# Patient Record
Sex: FEMALE | Race: WHITE | Employment: OTHER | URBAN - METROPOLITAN AREA
[De-identification: names, ages, dates, MRNs, and addresses within clinical notes are randomized per-mention and may not be internally consistent; named-entity substitution may affect disease eponyms.]

---

## 2017-06-20 ENCOUNTER — ED HISTORICAL/CONVERTED ENCOUNTER (OUTPATIENT)
Dept: OTHER | Age: 82
End: 2017-06-20

## 2018-02-22 ENCOUNTER — IP HISTORICAL/CONVERTED ENCOUNTER (OUTPATIENT)
Dept: OTHER | Age: 83
End: 2018-02-22

## 2018-05-22 ENCOUNTER — ED HISTORICAL/CONVERTED ENCOUNTER (OUTPATIENT)
Dept: OTHER | Age: 83
End: 2018-05-22

## 2019-03-17 ENCOUNTER — APPOINTMENT (OUTPATIENT)
Dept: CT IMAGING | Age: 84
DRG: 061 | End: 2019-03-17
Attending: EMERGENCY MEDICINE
Payer: MEDICARE

## 2019-03-17 ENCOUNTER — HOSPITAL ENCOUNTER (INPATIENT)
Age: 84
LOS: 1 days | Discharge: ANOTHER HOSPITAL | DRG: 061 | End: 2019-03-18
Attending: EMERGENCY MEDICINE | Admitting: INTERNAL MEDICINE
Payer: MEDICARE

## 2019-03-17 DIAGNOSIS — R47.01 EXPRESSIVE APHASIA: ICD-10-CM

## 2019-03-17 DIAGNOSIS — R29.810 FACIAL DROOP: ICD-10-CM

## 2019-03-17 DIAGNOSIS — I63.9 CEREBROVASCULAR ACCIDENT (CVA), UNSPECIFIED MECHANISM (HCC): Primary | ICD-10-CM

## 2019-03-17 PROBLEM — E03.9 ACQUIRED HYPOTHYROIDISM: Status: ACTIVE | Noted: 2019-03-17

## 2019-03-17 LAB
ANION GAP SERPL CALC-SCNC: 7 MMOL/L (ref 5–15)
BASOPHILS # BLD: 0.1 K/UL (ref 0–0.1)
BASOPHILS NFR BLD: 1 % (ref 0–1)
BUN SERPL-MCNC: 12 MG/DL (ref 6–20)
BUN/CREAT SERPL: 15 (ref 12–20)
CALCIUM SERPL-MCNC: 8.7 MG/DL (ref 8.5–10.1)
CHLORIDE SERPL-SCNC: 99 MMOL/L (ref 97–108)
CO2 SERPL-SCNC: 27 MMOL/L (ref 21–32)
CREAT SERPL-MCNC: 0.81 MG/DL (ref 0.55–1.02)
DIFFERENTIAL METHOD BLD: NORMAL
EOSINOPHIL # BLD: 0.2 K/UL (ref 0–0.4)
EOSINOPHIL NFR BLD: 3 % (ref 0–7)
ERYTHROCYTE [DISTWIDTH] IN BLOOD BY AUTOMATED COUNT: 12 % (ref 11.5–14.5)
GLUCOSE BLD STRIP.AUTO-MCNC: 106 MG/DL (ref 65–100)
GLUCOSE SERPL-MCNC: 84 MG/DL (ref 65–100)
HCT VFR BLD AUTO: 41.6 % (ref 35–47)
HGB BLD-MCNC: 14.4 G/DL (ref 11.5–16)
IMM GRANULOCYTES # BLD AUTO: 0 K/UL (ref 0–0.04)
IMM GRANULOCYTES NFR BLD AUTO: 0 % (ref 0–0.5)
INR BLD: 1 (ref 0.9–1.2)
INR PPP: 1 (ref 0.9–1.1)
LYMPHOCYTES # BLD: 2.4 K/UL (ref 0.8–3.5)
LYMPHOCYTES NFR BLD: 41 % (ref 12–49)
MCH RBC QN AUTO: 32.9 PG (ref 26–34)
MCHC RBC AUTO-ENTMCNC: 34.6 G/DL (ref 30–36.5)
MCV RBC AUTO: 95 FL (ref 80–99)
MONOCYTES # BLD: 0.7 K/UL (ref 0–1)
MONOCYTES NFR BLD: 11 % (ref 5–13)
NEUTS SEG # BLD: 2.6 K/UL (ref 1.8–8)
NEUTS SEG NFR BLD: 44 % (ref 32–75)
NRBC # BLD: 0 K/UL (ref 0–0.01)
NRBC BLD-RTO: 0 PER 100 WBC
PLATELET # BLD AUTO: 223 K/UL (ref 150–400)
PMV BLD AUTO: 9.1 FL (ref 8.9–12.9)
POTASSIUM SERPL-SCNC: 3.6 MMOL/L (ref 3.5–5.1)
PROTHROMBIN TIME: 10 SEC (ref 9–11.1)
RBC # BLD AUTO: 4.38 M/UL (ref 3.8–5.2)
SERVICE CMNT-IMP: ABNORMAL
SODIUM SERPL-SCNC: 133 MMOL/L (ref 136–145)
WBC # BLD AUTO: 6 K/UL (ref 3.6–11)

## 2019-03-17 PROCEDURE — 70450 CT HEAD/BRAIN W/O DYE: CPT

## 2019-03-17 PROCEDURE — 85610 PROTHROMBIN TIME: CPT

## 2019-03-17 PROCEDURE — 99285 EMERGENCY DEPT VISIT HI MDM: CPT

## 2019-03-17 PROCEDURE — 36415 COLL VENOUS BLD VENIPUNCTURE: CPT

## 2019-03-17 PROCEDURE — 74011000258 HC RX REV CODE- 258: Performed by: EMERGENCY MEDICINE

## 2019-03-17 PROCEDURE — 80048 BASIC METABOLIC PNL TOTAL CA: CPT

## 2019-03-17 PROCEDURE — 80061 LIPID PANEL: CPT

## 2019-03-17 PROCEDURE — 96374 THER/PROPH/DIAG INJ IV PUSH: CPT

## 2019-03-17 PROCEDURE — 65270000029 HC RM PRIVATE

## 2019-03-17 PROCEDURE — 70496 CT ANGIOGRAPHY HEAD: CPT

## 2019-03-17 PROCEDURE — 83036 HEMOGLOBIN GLYCOSYLATED A1C: CPT

## 2019-03-17 PROCEDURE — 85025 COMPLETE CBC W/AUTO DIFF WBC: CPT

## 2019-03-17 PROCEDURE — 74011250636 HC RX REV CODE- 250/636: Performed by: EMERGENCY MEDICINE

## 2019-03-17 PROCEDURE — 3E03317 INTRODUCTION OF OTHER THROMBOLYTIC INTO PERIPHERAL VEIN, PERCUTANEOUS APPROACH: ICD-10-PCS | Performed by: EMERGENCY MEDICINE

## 2019-03-17 PROCEDURE — 74011636320 HC RX REV CODE- 636/320

## 2019-03-17 PROCEDURE — 82962 GLUCOSE BLOOD TEST: CPT

## 2019-03-17 PROCEDURE — 93005 ELECTROCARDIOGRAM TRACING: CPT

## 2019-03-17 RX ORDER — SODIUM CHLORIDE 9 MG/ML
50 INJECTION, SOLUTION INTRAVENOUS ONCE
Status: COMPLETED | OUTPATIENT
Start: 2019-03-17 | End: 2019-03-18

## 2019-03-17 RX ORDER — SODIUM CHLORIDE 0.9 % (FLUSH) 0.9 %
5-40 SYRINGE (ML) INJECTION EVERY 8 HOURS
Status: DISCONTINUED | OUTPATIENT
Start: 2019-03-17 | End: 2019-03-18 | Stop reason: HOSPADM

## 2019-03-17 RX ORDER — LABETALOL HCL 20 MG/4 ML
10 SYRINGE (ML) INTRAVENOUS
Status: DISCONTINUED | OUTPATIENT
Start: 2019-03-17 | End: 2019-03-18 | Stop reason: HOSPADM

## 2019-03-17 RX ORDER — SODIUM CHLORIDE 0.9 % (FLUSH) 0.9 %
5-40 SYRINGE (ML) INJECTION AS NEEDED
Status: DISCONTINUED | OUTPATIENT
Start: 2019-03-17 | End: 2019-03-18 | Stop reason: HOSPADM

## 2019-03-17 RX ORDER — ACETAMINOPHEN 650 MG/1
650 SUPPOSITORY RECTAL
Status: DISCONTINUED | OUTPATIENT
Start: 2019-03-17 | End: 2019-03-18 | Stop reason: HOSPADM

## 2019-03-17 RX ORDER — ACETAMINOPHEN 325 MG/1
650 TABLET ORAL
Status: DISCONTINUED | OUTPATIENT
Start: 2019-03-17 | End: 2019-03-18 | Stop reason: HOSPADM

## 2019-03-17 RX ADMIN — IOPAMIDOL 100 ML: 755 INJECTION, SOLUTION INTRAVENOUS at 22:12

## 2019-03-17 RX ADMIN — ALTEPLASE 38.96 MG: KIT at 22:42

## 2019-03-17 RX ADMIN — SODIUM CHLORIDE 50 ML: 900 INJECTION, SOLUTION INTRAVENOUS at 23:28

## 2019-03-18 ENCOUNTER — APPOINTMENT (OUTPATIENT)
Dept: NON INVASIVE DIAGNOSTICS | Age: 84
DRG: 065 | End: 2019-03-18
Attending: NURSE PRACTITIONER
Payer: MEDICARE

## 2019-03-18 ENCOUNTER — APPOINTMENT (OUTPATIENT)
Dept: GENERAL RADIOLOGY | Age: 84
DRG: 065 | End: 2019-03-18
Attending: FAMILY MEDICINE
Payer: MEDICARE

## 2019-03-18 ENCOUNTER — APPOINTMENT (OUTPATIENT)
Dept: MRI IMAGING | Age: 84
DRG: 065 | End: 2019-03-18
Attending: NURSE PRACTITIONER
Payer: MEDICARE

## 2019-03-18 ENCOUNTER — APPOINTMENT (OUTPATIENT)
Dept: CT IMAGING | Age: 84
DRG: 061 | End: 2019-03-18
Attending: INTERNAL MEDICINE
Payer: MEDICARE

## 2019-03-18 ENCOUNTER — APPOINTMENT (OUTPATIENT)
Dept: CT IMAGING | Age: 84
DRG: 065 | End: 2019-03-18
Attending: PSYCHIATRY & NEUROLOGY
Payer: MEDICARE

## 2019-03-18 ENCOUNTER — HOSPITAL ENCOUNTER (INPATIENT)
Age: 84
LOS: 3 days | Discharge: NURSING, REHAB OR LONG-TERM CARE | DRG: 065 | End: 2019-03-21
Attending: FAMILY MEDICINE | Admitting: FAMILY MEDICINE
Payer: MEDICARE

## 2019-03-18 VITALS
OXYGEN SATURATION: 98 % | HEART RATE: 64 BPM | BODY MASS INDEX: 16.64 KG/M2 | DIASTOLIC BLOOD PRESSURE: 56 MMHG | HEIGHT: 67 IN | WEIGHT: 106.04 LBS | RESPIRATION RATE: 14 BRPM | TEMPERATURE: 97.5 F | SYSTOLIC BLOOD PRESSURE: 125 MMHG

## 2019-03-18 DIAGNOSIS — I63.9 CEREBROVASCULAR ACCIDENT (CVA), UNSPECIFIED MECHANISM (HCC): ICD-10-CM

## 2019-03-18 DIAGNOSIS — I61.6 NONTRAUMATIC MULTIPLE LOCALIZED INTRACEREBRAL HEMORRHAGES, UNSPECIFIED LATERALITY (HCC): ICD-10-CM

## 2019-03-18 DIAGNOSIS — I68.0 CEREBRAL AMYLOID ANGIOPATHY (CODE): ICD-10-CM

## 2019-03-18 DIAGNOSIS — Z92.82 STATUS POST ADMINISTRATION OF TPA (RTPA) IN A DIFFERENT FACILITY WITHIN THE LAST 24 HOURS PRIOR TO ADMISSION TO CURRENT FACILITY: ICD-10-CM

## 2019-03-18 DIAGNOSIS — R47.01 APHASIA: ICD-10-CM

## 2019-03-18 PROBLEM — I60.9 SAH (SUBARACHNOID HEMORRHAGE) (HCC): Status: ACTIVE | Noted: 2019-03-18

## 2019-03-18 PROBLEM — I61.9 ICH (INTRACEREBRAL HEMORRHAGE) (HCC): Status: ACTIVE | Noted: 2019-03-18

## 2019-03-18 LAB
ALBUMIN SERPL-MCNC: 3.9 G/DL (ref 3.5–5)
ALBUMIN/GLOB SERPL: 1.1 {RATIO} (ref 1.1–2.2)
ALP SERPL-CCNC: 65 U/L (ref 45–117)
ALT SERPL-CCNC: 19 U/L (ref 12–78)
ANION GAP SERPL CALC-SCNC: 5 MMOL/L (ref 5–15)
AST SERPL-CCNC: 20 U/L (ref 15–37)
ATRIAL RATE: 72 BPM
BASOPHILS # BLD: 0 K/UL (ref 0–0.1)
BASOPHILS NFR BLD: 1 % (ref 0–1)
BILIRUB SERPL-MCNC: 0.6 MG/DL (ref 0.2–1)
BUN SERPL-MCNC: 9 MG/DL (ref 6–20)
BUN/CREAT SERPL: 12 (ref 12–20)
CALCIUM SERPL-MCNC: 9.1 MG/DL (ref 8.5–10.1)
CALCULATED P AXIS, ECG09: 69 DEGREES
CALCULATED R AXIS, ECG10: 23 DEGREES
CALCULATED T AXIS, ECG11: 55 DEGREES
CHLORIDE SERPL-SCNC: 103 MMOL/L (ref 97–108)
CHOLEST SERPL-MCNC: 189 MG/DL
CO2 SERPL-SCNC: 27 MMOL/L (ref 21–32)
CREAT SERPL-MCNC: 0.74 MG/DL (ref 0.55–1.02)
DIAGNOSIS, 93000: NORMAL
DIFFERENTIAL METHOD BLD: ABNORMAL
EOSINOPHIL # BLD: 0.1 K/UL (ref 0–0.4)
EOSINOPHIL NFR BLD: 1 % (ref 0–7)
ERYTHROCYTE [DISTWIDTH] IN BLOOD BY AUTOMATED COUNT: 12.1 % (ref 11.5–14.5)
EST. AVERAGE GLUCOSE BLD GHB EST-MCNC: 120 MG/DL
GLOBULIN SER CALC-MCNC: 3.6 G/DL (ref 2–4)
GLUCOSE SERPL-MCNC: 104 MG/DL (ref 65–100)
HBA1C MFR BLD: 5.8 % (ref 4.2–6.3)
HCT VFR BLD AUTO: 41.6 % (ref 35–47)
HDLC SERPL-MCNC: 70 MG/DL
HDLC SERPL: 2.7 {RATIO} (ref 0–5)
HGB BLD-MCNC: 14.1 G/DL (ref 11.5–16)
IMM GRANULOCYTES # BLD AUTO: 0 K/UL (ref 0–0.04)
IMM GRANULOCYTES NFR BLD AUTO: 0 % (ref 0–0.5)
INR PPP: 1 (ref 0.9–1.1)
LACTATE SERPL-SCNC: 1.7 MMOL/L (ref 0.4–2)
LDLC SERPL CALC-MCNC: 60.4 MG/DL (ref 0–100)
LIPID PROFILE,FLP: ABNORMAL
LYMPHOCYTES # BLD: 1.9 K/UL (ref 0.8–3.5)
LYMPHOCYTES NFR BLD: 25 % (ref 12–49)
MAGNESIUM SERPL-MCNC: 2 MG/DL (ref 1.6–2.4)
MCH RBC QN AUTO: 32.1 PG (ref 26–34)
MCHC RBC AUTO-ENTMCNC: 33.9 G/DL (ref 30–36.5)
MCV RBC AUTO: 94.8 FL (ref 80–99)
MONOCYTES # BLD: 0.7 K/UL (ref 0–1)
MONOCYTES NFR BLD: 9 % (ref 5–13)
NEUTS SEG # BLD: 4.9 K/UL (ref 1.8–8)
NEUTS SEG NFR BLD: 64 % (ref 32–75)
NRBC # BLD: 0 K/UL (ref 0–0.01)
NRBC BLD-RTO: 0 PER 100 WBC
P-R INTERVAL, ECG05: 144 MS
PHOSPHATE SERPL-MCNC: 3.2 MG/DL (ref 2.6–4.7)
PLATELET # BLD AUTO: 198 K/UL (ref 150–400)
PMV BLD AUTO: 8.7 FL (ref 8.9–12.9)
POTASSIUM SERPL-SCNC: 3.7 MMOL/L (ref 3.5–5.1)
PROT SERPL-MCNC: 7.5 G/DL (ref 6.4–8.2)
PROTHROMBIN TIME: 10.1 SEC (ref 9–11.1)
Q-T INTERVAL, ECG07: 404 MS
QRS DURATION, ECG06: 78 MS
QTC CALCULATION (BEZET), ECG08: 442 MS
RBC # BLD AUTO: 4.39 M/UL (ref 3.8–5.2)
SODIUM SERPL-SCNC: 135 MMOL/L (ref 136–145)
TRIGL SERPL-MCNC: 293 MG/DL (ref ?–150)
TROPONIN I SERPL-MCNC: <0.05 NG/ML
VENTRICULAR RATE, ECG03: 72 BPM
VLDLC SERPL CALC-MCNC: 58.6 MG/DL
WBC # BLD AUTO: 7.7 K/UL (ref 3.6–11)

## 2019-03-18 PROCEDURE — 74011250637 HC RX REV CODE- 250/637: Performed by: INTERNAL MEDICINE

## 2019-03-18 PROCEDURE — 83605 ASSAY OF LACTIC ACID: CPT

## 2019-03-18 PROCEDURE — 93306 TTE W/DOPPLER COMPLETE: CPT

## 2019-03-18 PROCEDURE — 84484 ASSAY OF TROPONIN QUANT: CPT

## 2019-03-18 PROCEDURE — 70551 MRI BRAIN STEM W/O DYE: CPT

## 2019-03-18 PROCEDURE — 92610 EVALUATE SWALLOWING FUNCTION: CPT | Performed by: SPEECH-LANGUAGE PATHOLOGIST

## 2019-03-18 PROCEDURE — 73502 X-RAY EXAM HIP UNI 2-3 VIEWS: CPT

## 2019-03-18 PROCEDURE — 83735 ASSAY OF MAGNESIUM: CPT

## 2019-03-18 PROCEDURE — 85025 COMPLETE CBC W/AUTO DIFF WBC: CPT

## 2019-03-18 PROCEDURE — 71045 X-RAY EXAM CHEST 1 VIEW: CPT

## 2019-03-18 PROCEDURE — 74011250636 HC RX REV CODE- 250/636: Performed by: EMERGENCY MEDICINE

## 2019-03-18 PROCEDURE — 70450 CT HEAD/BRAIN W/O DYE: CPT

## 2019-03-18 PROCEDURE — 74011250637 HC RX REV CODE- 250/637: Performed by: FAMILY MEDICINE

## 2019-03-18 PROCEDURE — 74011000250 HC RX REV CODE- 250: Performed by: FAMILY MEDICINE

## 2019-03-18 PROCEDURE — 65610000006 HC RM INTENSIVE CARE

## 2019-03-18 PROCEDURE — 80053 COMPREHEN METABOLIC PANEL: CPT

## 2019-03-18 PROCEDURE — 36415 COLL VENOUS BLD VENIPUNCTURE: CPT

## 2019-03-18 PROCEDURE — 85610 PROTHROMBIN TIME: CPT

## 2019-03-18 PROCEDURE — 74011250636 HC RX REV CODE- 250/636: Performed by: FAMILY MEDICINE

## 2019-03-18 PROCEDURE — 84100 ASSAY OF PHOSPHORUS: CPT

## 2019-03-18 RX ORDER — ERGOCALCIFEROL 1.25 MG/1
50000 CAPSULE ORAL
COMMUNITY
End: 2019-04-22

## 2019-03-18 RX ORDER — FAMOTIDINE 20 MG/1
20 TABLET, FILM COATED ORAL DAILY
Status: DISCONTINUED | OUTPATIENT
Start: 2019-03-18 | End: 2019-03-21 | Stop reason: HOSPADM

## 2019-03-18 RX ORDER — LEVOTHYROXINE SODIUM 50 UG/1
25 TABLET ORAL
Status: DISCONTINUED | OUTPATIENT
Start: 2019-03-18 | End: 2019-03-21 | Stop reason: HOSPADM

## 2019-03-18 RX ORDER — FERROUS SULFATE, DRIED 160(50) MG
1 TABLET, EXTENDED RELEASE ORAL DAILY
Status: DISCONTINUED | OUTPATIENT
Start: 2019-03-18 | End: 2019-03-18

## 2019-03-18 RX ORDER — BRIMONIDINE TARTRATE 2 MG/ML
1 SOLUTION/ DROPS OPHTHALMIC 2 TIMES DAILY
Status: DISCONTINUED | OUTPATIENT
Start: 2019-03-18 | End: 2019-03-21 | Stop reason: HOSPADM

## 2019-03-18 RX ORDER — DORZOLAMIDE HCL 20 MG/ML
1 SOLUTION/ DROPS OPHTHALMIC 2 TIMES DAILY
Status: DISCONTINUED | OUTPATIENT
Start: 2019-03-18 | End: 2019-03-21 | Stop reason: HOSPADM

## 2019-03-18 RX ORDER — NALOXONE HYDROCHLORIDE 0.4 MG/ML
0.4 INJECTION, SOLUTION INTRAMUSCULAR; INTRAVENOUS; SUBCUTANEOUS AS NEEDED
Status: DISCONTINUED | OUTPATIENT
Start: 2019-03-18 | End: 2019-03-21 | Stop reason: HOSPADM

## 2019-03-18 RX ORDER — MULTIVIT WITH MINERALS/HERBS
1 TABLET ORAL DAILY
Status: DISCONTINUED | OUTPATIENT
Start: 2019-03-18 | End: 2019-03-18

## 2019-03-18 RX ORDER — LANOLIN ALCOHOL/MO/W.PET/CERES
1 CREAM (GRAM) TOPICAL DAILY
Status: DISCONTINUED | OUTPATIENT
Start: 2019-03-18 | End: 2019-03-18

## 2019-03-18 RX ORDER — TIMOLOL MALEATE 5 MG/ML
1 SOLUTION/ DROPS OPHTHALMIC 2 TIMES DAILY
Status: DISCONTINUED | OUTPATIENT
Start: 2019-03-18 | End: 2019-03-21 | Stop reason: HOSPADM

## 2019-03-18 RX ORDER — FAMOTIDINE 20 MG/1
20 TABLET, FILM COATED ORAL 2 TIMES DAILY
Status: DISCONTINUED | OUTPATIENT
Start: 2019-03-18 | End: 2019-03-18

## 2019-03-18 RX ORDER — SODIUM CHLORIDE 9 MG/ML
125 INJECTION, SOLUTION INTRAVENOUS CONTINUOUS
Status: DISCONTINUED | OUTPATIENT
Start: 2019-03-18 | End: 2019-03-20

## 2019-03-18 RX ORDER — ONDANSETRON 2 MG/ML
4 INJECTION INTRAMUSCULAR; INTRAVENOUS
Status: DISCONTINUED | OUTPATIENT
Start: 2019-03-18 | End: 2019-03-21 | Stop reason: HOSPADM

## 2019-03-18 RX ADMIN — DORZOLAMIDE HYDROCHLORIDE 1 DROP: 20 SOLUTION/ DROPS OPHTHALMIC at 10:47

## 2019-03-18 RX ADMIN — SODIUM CHLORIDE 125 ML/HR: 900 INJECTION, SOLUTION INTRAVENOUS at 06:52

## 2019-03-18 RX ADMIN — SODIUM CHLORIDE 5 MG/HR: 900 INJECTION, SOLUTION INTRAVENOUS at 05:38

## 2019-03-18 RX ADMIN — SODIUM CHLORIDE 125 ML/HR: 900 INJECTION, SOLUTION INTRAVENOUS at 18:10

## 2019-03-18 RX ADMIN — DORZOLAMIDE HYDROCHLORIDE 1 DROP: 20 SOLUTION/ DROPS OPHTHALMIC at 18:08

## 2019-03-18 RX ADMIN — TIMOLOL MALEATE 1 DROP: 5 SOLUTION OPHTHALMIC at 18:00

## 2019-03-18 RX ADMIN — Medication 1 TABLET: at 10:42

## 2019-03-18 RX ADMIN — BRIMONIDINE TARTRATE 1 DROP: 2 SOLUTION OPHTHALMIC at 11:07

## 2019-03-18 RX ADMIN — TIMOLOL MALEATE 1 DROP: 5 SOLUTION OPHTHALMIC at 11:07

## 2019-03-18 RX ADMIN — CALCIUM CARBONATE-VITAMIN D TAB 500 MG-200 UNIT 1 TABLET: 500-200 TAB at 10:42

## 2019-03-18 RX ADMIN — LABETALOL 20 MG/4 ML (5 MG/ML) INTRAVENOUS SYRINGE 10 MG: at 01:55

## 2019-03-18 RX ADMIN — FAMOTIDINE 20 MG: 20 TABLET ORAL at 12:16

## 2019-03-18 RX ADMIN — BRIMONIDINE TARTRATE 1 DROP: 2 SOLUTION OPHTHALMIC at 17:59

## 2019-03-18 RX ADMIN — LEVOTHYROXINE SODIUM 25 MCG: 25 TABLET ORAL at 10:42

## 2019-03-18 NOTE — ED NOTES
Patient on q15 checks. A&O x1. According to son at bedside, patient \"sounds like she's speaking a combination of Norweigian and Kyrgyz\".  Contacted Dr. Su Grimes, suggested CT with no contrast.

## 2019-03-18 NOTE — PROGRESS NOTES
Notified by nursing of a change in neuro exam ie: worsened aphasia. STAT head CT ordered. Results of repeat CT head reviewed:   1. New small foci of subarachnoid hemorrhage in the right frontal lobe and right  occipital lobe. 2. New small intraparenchymal hemorrhage in the left occipital lobe. --reviewed with pt and family. Her BP is currently 175/80. Discussed with nursing. Goal BP will now be <160, prn labetalol ordered  --stat call to neurosurgery and [facility] Will proceed with transfer to [facility].  Awaiting call back from neurosurgery  An additional 40 minutes of critical care was provided

## 2019-03-18 NOTE — H&P
[address]  [address] [place] [address] 19  [phone]    Admission History and Physical      NAME:  Loren Lara   :   7/3/1928   MRN:  [id number]     PCP:  Ly Garcia MD     Date/Time:  3/17/2019         Subjective:     CHIEF COMPLAINT: aphasia     HISTORY OF PRESENT ILLNESS:     Ms. Jamila Smith is a 80 y.o. with h/o hypothyroid who presents with aphasia. History taken via family as pt has residual aphasia. Pt was out to a birthday dinner when family noted she had difficulty speaking. Also noted a right facial droop and right sided weakness. No past medical history on file. Past Surgical History:   Procedure Laterality Date    HX BACK SURGERY      HX CORNEAL TRANSPLANT      HX HIP REPLACEMENT      right    HX HYSTERECTOMY      HX TONSILLECTOMY         Social History     Tobacco Use    Smoking status: Never Smoker   Substance Use Topics    Alcohol use: Yes     Alcohol/week: 0.5 oz     Types: 1 Glasses of wine per week        Family history  htn     No Known Allergies     Prior to Admission medications    Medication Sig Start Date End Date Taking? Authorizing Provider   levothyroxine (SYNTHROID) 25 mcg tablet TAKE 1 TABLET IN THE MORNING BEFORE BREAKFAST 14   Nanette Becker MD   mirtazapine (REMERON) 15 mg tablet Take 1 Tab by mouth nightly. 3/13/14   Nanette Becker MD   cycloSPORINE (RESTASIS) 0.05 % ophthalmic emulsion Administer 1 Drop to both eyes two (2) times a day. Provider, Historical   brinzolamide (AZOPT) 1 % ophthalmic suspension Administer 1 Drop to right eye two (2) times a day. Provider, Historical   brimonidine-timolol (COMBIGAN) 0.2-0.5 % Drop ophthalmic solution Administer 1 Drop to right eye every twelve (12) hours. Provider, Historical   omega-3 fatty acids-vitamin e (FISH OIL) 1,000 mg cap Take 1 Cap by mouth.     Provider, Historical   calcium-cholecalciferol, d3, (CALCIUM 600 + D) 600-125 mg-unit Tab Take 1 Tab by mouth daily. Provider, Historical   FERROUS FUMARATE/VIT BCOMP&C (SUPER B COMPLEX PO) Take 1 Tab by mouth daily. Provider, Historical   lutein 20 mg cap Take 1 Tab by mouth daily. Provider, Historical   naproxen sodium (NAPROSYN) 220 mg tablet Take 220 mg by mouth as needed.     Provider, Historical         Review of Systems:       Gen:  Eyes:  ENT:  CVS:  Pulm:  GI:    :    MS:  Skin:  Psych:  Endo:    Hem:  Renal:    Neuro:  weakness          Objective:      VITALS:    Vital signs reviewed; most recent are:    Visit Vitals  /49 (BP 1 Location: Left arm, BP Patient Position: At rest)   Pulse 71   Temp 97.4 °F (36.3 °C)   Resp 17   Ht 5' 7\" (1.702 m)   Wt 48.1 kg (106 lb 0.7 oz)   SpO2 96%   BMI 16.61 kg/m²     SpO2 Readings from Last 6 Encounters:   03/17/19 96%   01/09/14 96%        No intake or output data in the 24 hours ending 03/17/19 1983         Exam:     Physical Exam:    Gen:  elderly, in no acute distress  HEENT:  Pink conjunctivae, PERRL, hearing intact to voice, moist mucous membranes  Neck:  Supple, without masses, thyroid non-tender  Resp:  No accessory muscle use, clear breath sounds without wheezes rales or rhonchi  Card:  No murmurs, normal S1, S2 without thrills, bruits or peripheral edema  Abd:  Soft, non-tender, non-distended, normoactive bowel sounds are present, no palpable organomegaly  Lymph:  No cervical adenopathy  Musc:  No cyanosis or clubbing  Skin:  No rashes or ulcers, skin turgor is good  Neuro:  Mild right sided facial droop, aphasia,  strength is 5/5 bilaterally, dorsi / plantarflexion strength is 5/5 bilaterally, follows commands appropriately  Psych:  Alert with  aphasia       Labs:    Recent Labs     03/17/19  2247   WBC 6.0   HGB 14.4   HCT 41.6        Recent Labs     03/17/19  2247   *   K 3.6   CL 99   CO2 27   GLU 84   BUN 12   CREA 0.81   CA 8.7     No components found for: GLPOC  No results for input(s): PH, PCO2, PO2, HCO3, FIO2 in the last 72 hours. Recent Labs     03/17/19  2247   INR 1.0          EKG reviewed:  Normal sinus rhythm       Assessment/Plan:       Facial droop / Aphasia / CVA: tPA given in ER. CT head and CTA head/neck normal. Monitor closely in ICU with neuro checks. BP goal <180. Order MRI brain and echo. Monitor on telemetry. No asa due to tPA administration. Send lipid panel and A1c.  Consult neuro    Active Problems:    Acquired hypothyroidism: resume synthroid     Glaucoma: resume eye drops      Surrogate decision maker: maximino    Total time spent with patient: 79 [address] 1950 discussed with: Patient and Family    Discussed:  Care Plan    Prophylaxis:  Sp tPA    Probable Disposition:   PT, OT, RN           ___________________________________________________    Attending Physician: Michelene Goldberg, MD

## 2019-03-18 NOTE — PROGRESS NOTES
Occupational Therapy Note:  Orders received and appreciated. Chart reviewed. Pt admitted to [facility] 3/17/19 and given tPA 2241. Hemorrhagic conversion noted on scan and pt transferred to [facility]. Will follow up with OT eval 24 hours post tPA which will be on 3/19/2019. Thank you for the consult.   Stephan Shoemaker, OTR/JOEL, CBIS

## 2019-03-18 NOTE — CONSULTS
[place], [place]  Neurocritical Care Physician Assistant  MIRIAN Cell: [phone]      Patient: Lalita Rader MRN: [id number]  SSN: [id number]    YOB: 1928  Age: 80 y.o. Sex: female      Chief Complaint: CVA, s/p tPA, with hemorrhagic conversion    Subjective:      Lalita Rader is a 80 y.o. female who is being seen for CVA, s/p tPA, with hemorrhagic conversion. Patient was seen tonight at [facility] with right facial droop, right sided weakness and aphasia seen at dinner tonight at approximately 9:30. Patient had HCT and CTA which showed no hemorrhage and no evidence of large vessel occlusion, and tPA was administered. Subsequently, patient was seen to have worsening aphasia, and a repeat HCT was performed showing small acute hemorrhages, indicating a hemorrhagic conversion. On arrival at [facility] ICU, patient is awake and alert, concerned about not speaking properly, but otherwise in no distress. Patient denies headache, dizziness, nausea/vomiting, chest pain, shortness of breath. No past medical history on file. Past Surgical History:   Procedure Laterality Date    HX BACK SURGERY      HX CORNEAL TRANSPLANT      HX HIP REPLACEMENT      right    HX HYSTERECTOMY      HX TONSILLECTOMY        No family history on file. Social History     Tobacco Use    Smoking status: Never Smoker   Substance Use Topics    Alcohol use: Yes     Alcohol/week: 0.5 oz     Types: 1 Glasses of wine per week           No Known Allergies    Review of Systems:  A comprehensive review of systems was negative except for that written in the History of Present Illness. Denies numbness, tingling, chest pain, leg pain, nausea, vomiting, difficulty swallowing, headache, and dyspnea.      Objective:     Vitals:    03/18/19 0425   BP: 134/68   Pulse: 77   Resp: 15   Temp: 97.8 °F (36.6 °C)   SpO2: 97%   Weight: 47.3 kg (104 lb 4.4 oz)     Physical exam  Lungs: CTA bilaterally  Heart: RRR, no m/r/g  Skin warm and dry, cap refill: <2 seconds  No bleeding or hematoma     Neuro Exam:   Awake and alert, oriented to self, year/month, \"hospital\"  Speech is clear, with some word finding difficulty  Following commands  PERRL 4 mm EOMI, slight right droop  Motor exam, strength:   RUE: 4+/5 LUE: 5/5  RLE: 4+/5 LLE: 5/5  Sensation intact  Alex's: negative  Pronator Drift: absent  Clonus: negative  Babinski: downward bilaterally     Labs:  Recent Results (from the past 24 hour(s))   GLUCOSE, POC    Collection Time: 03/17/19 10:01 PM   Result Value Ref Range    Glucose (POC) 106 (H) 65 - 100 mg/dL    Performed by [facility])    POC INR    Collection Time: 03/17/19 10:02 PM   Result Value Ref Range    INR (POC) 1.0 <1.2     EKG, 12 LEAD, INITIAL    Collection Time: 03/17/19 10:18 PM   Result Value Ref Range    Ventricular Rate 72 BPM    Atrial Rate 72 BPM    P-R Interval 144 ms    QRS Duration 78 ms    Q-T Interval 404 ms    QTC Calculation (Bezet) 442 ms    Calculated P Axis 69 degrees    Calculated R Axis 23 degrees    Calculated T Axis 55 degrees    Diagnosis       Normal sinus rhythm  Septal infarct , age undetermined  Abnormal ECG  No previous ECGs available     CBC WITH AUTOMATED DIFF    Collection Time: 03/17/19 10:47 PM   Result Value Ref Range    WBC 6.0 3.6 - 11.0 K/uL    RBC 4.38 3.80 - 5.20 M/uL    HGB 14.4 11.5 - 16.0 g/dL    HCT 41.6 35.0 - 47.0 %    MCV 95.0 80.0 - 99.0 FL    MCH 32.9 26.0 - 34.0 PG    MCHC 34.6 30.0 - 36.5 g/dL    RDW 12.0 11.5 - 14.5 %    PLATELET 822 794 - 278 K/uL    MPV 9.1 8.9 - 12.9 FL    NRBC 0.0 0  WBC    ABSOLUTE NRBC 0.00 0.00 - 0.01 K/uL    NEUTROPHILS 44 32 - 75 %    LYMPHOCYTES 41 12 - 49 %    MONOCYTES 11 5 - 13 %    EOSINOPHILS 3 0 - 7 %    BASOPHILS 1 0 - 1 %    IMMATURE GRANULOCYTES 0 0.0 - 0.5 %    ABS. NEUTROPHILS 2.6 1.8 - 8.0 K/UL    ABS. LYMPHOCYTES 2.4 0.8 - 3.5 K/UL    ABS. MONOCYTES 0.7 0.0 - 1.0 K/UL    ABS. EOSINOPHILS 0.2 0.0 - 0.4 K/UL    ABS. BASOPHILS 0.1 0.0 - 0.1 K/UL    ABS. IMM. GRANS. 0.0 0.00 - 0.04 K/UL    DF AUTOMATED     METABOLIC PANEL, BASIC    Collection Time: 03/17/19 10:47 PM   Result Value Ref Range    Sodium 133 (L) 136 - 145 mmol/L    Potassium 3.6 3.5 - 5.1 mmol/L    Chloride 99 97 - 108 mmol/L    CO2 27 21 - 32 mmol/L    Anion gap 7 5 - 15 mmol/L    Glucose 84 65 - 100 mg/dL    BUN 12 6 - 20 MG/DL    Creatinine 0.81 0.55 - 1.02 MG/DL    BUN/Creatinine ratio 15 12 - 20      GFR est AA >60 >60 ml/min/1.73m2    GFR est non-AA >60 >60 ml/min/1.73m2    Calcium 8.7 8.5 - 10.1 MG/DL   PROTHROMBIN TIME + INR    Collection Time: 03/17/19 10:47 PM   Result Value Ref Range    INR 1.0 0.9 - 1.1      Prothrombin time 10.0 9.0 - 11.1 sec     Imaging:  Ct Head Wo Cont    Result Date: 3/18/2019  IMPRESSION: 1. New small foci of subarachnoid hemorrhage in the right frontal lobe and right occipital lobe. 2. New small intraparenchymal hemorrhage in the left occipital lobe. 3. Unchanged generalized parenchymal volume loss and severe chronic microvascular ischemic disease. The findings were called to Dr. Otilio Saucedo on 3/18/2019 at 1:22 AM by Dr. Kari Chatterjee. [address] Neuro Head And Neck W Cont    Result Date: 3/18/2019  IMPRESSION: CTA Head: 1. No evidence of large vessel occlusion or flow-limiting stenosis. No evidence of aneurysm. Scattered atherosclerotic disease as above. CTA Neck: 1. Mild stenosis at the origin of the right vertebral artery. Otherwise no evidence of significant stenosis of the cervical arterial vasculature. 2. 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 3. Calcific atherosclerosis of the bilateral carotid bifurcations without significant stenosis. Ct Code Neuro Head Wo Contrast    Result Date: 3/17/2019  IMPRESSION: 1. No evidence of acute intracranial abnormality.  2. Generalized parenchymal volume loss and moderate chronic microvascular ischemic disease. Assessment:     Hospital Problems  Date Reviewed: 3/17/2019    None      80year old female with left side CVA, s/p tPA, with hemorrhagic conversion; with small areas of hemorrhage in right frontal and right occipital lobes. Plan:   Admission to ICU  q 1 hour neuro checks  SBP goal <180  MRI w/wo contrast  No ASA/antocoagulation/antiplatelets due to hemorrhage  Resume home medication: synthroid, glaucoma eye drops  Neurosurgery Consult    ICH score: 1    I have discussed the diagnosis and the intended plan as seen in the above orders with the patient and Dr Edson Vega. Patient is in agreement. Thank you for this consult and participating in the care of this patient.   Signed By: LUCAS Blanc     March 18, 2019

## 2019-03-18 NOTE — PROGRESS NOTES
0730- Bedside shift change report given to Rosalia Nair RN (oncoming nurse) by Markos Miller RN (offgoing nurse). Report included the following information SBAR, Kardex, ED Summary, Intake/Output, MAR, Accordion, Recent Results, Med Rec Status, Cardiac Rhythm SR and Alarm Parameters . SHIFT SUMMARY- Patient alert to drowsy, oriented to self and place, sometimes oriented to time and situation. Still with expressive aphasia, delayed responses, waxes and wanes throughout shift, left pupil round/brisk, right pupil fixed s/p corneal transplant, slight right facial droop, all extremities move equally, follows commands. VSS, SBP remains <140, no c/o pain. Family at bedside, updated by RN and MDs.

## 2019-03-18 NOTE — ED NOTES
Dr. Elkins Son neuro called and informed pt has returned to room from CT, stated another pt is in line in front of her to be seen.

## 2019-03-18 NOTE — PROGRESS NOTES
0425: Pt arrived to unit and assessed by myself and [place], [facility]: Pt d/c from [facility] ED and admitted to our system. 5818: Dr. Lopez Standing paged    1317: Dr. Jessica Damon returned page and stated to consult NeuroSurg stat. 0510: Neurosurgery consulted. 3563: Dr. João Sorto returned page. Stated she will be in to assess pt and to keep SBP <140.      0510: Dr Lopez Standing in to assess pt.    0600: Son in to visit with pt.

## 2019-03-18 NOTE — PROGRESS NOTES
Hospitalist Progress Note  Nadya Leach MD  Answering service: [phone] OR [phone] from in house phone        Date of Service:  3/18/2019  NAME:  Breezy Louis  :  7/3/1928  MRN:  [id number]      Admission Summary: This is a 59-year-old white female with past medical history of hypothyroidism, presented as admission/transfer from [facility] Emergency Department to [facility] ICU with initial reports of aphasia, right facial droop with new diagnosis of subarachnoid hemorrhage, intracranial hemorrhage. Interval history / Subjective:   Pt is awake , seen by neurology      Assessment & Plan:     1. Acute CVA with  Subarachnoid hemorrhage - in the right frontal lobe and right occipital lobe  -  Place a neurovascular check, fall precautions. We will repeat a CT of the head. - need MRI a swell    2. Small left intraparenchymal hemorrhage   - involving the left occipital lobe. Plan is as noted above. - cont on Cardene and titrate her IV infusion and to keep her goal systolic blood pressure less than 140 mmHg. 3.  Status post fall -   - no hip or rib fractured    4. Expressive aphasia. - To consult with speech pathologist.      5.  Hypothyroidism - check TSH level. 7.  Hyponatremia, mild. Repeat sodium levels. Code status: Full  DVT prophylaxis: SCD    Care Plan discussed with: Patient/Family and Nurse  Disposition: TBD     Hospital Problems  Date Reviewed: 3/18/2019          Codes Class Noted POA    Aphasia ICD-10-CM: R47.01  ICD-9-CM: 784.3  3/18/2019 Unknown        ICH (intracerebral hemorrhage) ([facility] [address] ICD-10-CM: I61.9  ICD-9-CM: 543  3/18/2019 Unknown        * (Principal) SAH (subarachnoid hemorrhage) ([facility] [address] ICD-10-CM: I60.9  ICD-9-CM: 267  3/18/2019 Unknown                Review of Systems:   Review of systems not obtained due to patient factors.      Xr Hip Lt W Or Wo Pelv 2-3 Vws    Result Date: 3/18/2019  IMPRESSION: Soft tissue swelling along the lateral aspect of the left hip. No evidence of acute fracture. Ct Head Wo Cont    Result Date: 3/18/2019  IMPRESSION: 1. New small foci of subarachnoid hemorrhage in the right frontal lobe and right occipital lobe. 2. New small intraparenchymal hemorrhage in the left occipital lobe. 3. Unchanged generalized parenchymal volume loss and severe chronic microvascular ischemic disease. The findings were called to Dr. Marylene Minder on 3/18/2019 at 1:22 AM by Dr. Harshal Gonzalez. [address]    Result Date: 3/18/2019  IMPRESSION: 1. No acute process    Vital Signs:    Last 24hrs VS reviewed since prior progress note. Most recent are:  Visit Vitals  /66   Pulse 92   Temp 97.7 °F (36.5 °C)   Resp 23   Wt 47.3 kg (104 lb 4.4 oz)   SpO2 94%   BMI 16.33 kg/m²         Intake/Output Summary (Last 24 hours) at 3/18/2019 1132  Last data filed at 3/18/2019 1000  Gross per 24 hour   Intake 410 ml   Output    Net 410 ml        Physical Examination:             Constitutional:  No acute distress,   ENT:  Oral mucous moist,     Resp:  CTA bilaterally. No wheezing/rhonchi/rales. CV:  Regular rhythm, normal rate,     GI:  Soft, non distended, non tender. bs+    Musculoskeletal:  No edema, warm, 2+ pulses throughout    Neurologic:  Moves all extremities. AAOx1/2            Data Review:    I personally reviewed  Image and labs    Xr Hip Lt W Or Wo Pelv 2-3 Vws    Result Date: 3/18/2019  IMPRESSION: Soft tissue swelling along the lateral aspect of the left hip. No evidence of acute fracture. Ct Head Wo Cont    Result Date: 3/18/2019  IMPRESSION: 1. New small foci of subarachnoid hemorrhage in the right frontal lobe and right occipital lobe. 2. New small intraparenchymal hemorrhage in the left occipital lobe. 3. Unchanged generalized parenchymal volume loss and severe chronic microvascular ischemic disease.  The findings were called to Dr. Marylene Minder on 3/18/2019 at 1:22 AM by Dr. Maurizio Lombardo. [address]    Result Date: 3/18/2019  IMPRESSION: 1. No acute process    Labs:     Recent Labs     03/18/19  0641 03/17/19  2247   WBC 7.7 6.0   HGB 14.1 14.4   HCT 41.6 41.6    223     Recent Labs     03/18/19  0641 03/17/19  2247   * 133*   K 3.7 3.6    99   CO2 27 27   BUN 9 12   CREA 0.74 0.81   * 84   CA 9.1 8.7   MG 2.0  --    PHOS 3.2  --      Recent Labs     03/18/19  0641   SGOT 20   ALT 19   AP 65   TBILI 0.6   TP 7.5   ALB 3.9   GLOB 3.6     Recent Labs     03/18/19  0641 03/17/19  2247 03/17/19  2202   INR 1.0 1.0 1.0   PTP 10.1 10.0  --       No results for input(s): FE, TIBC, PSAT, FERR in the last 72 hours. No results found for: FOL, RBCF   No results for input(s): PH, PCO2, PO2 in the last 72 hours.   Recent Labs     03/18/19  0641   TROIQ <0.05     Lab Results   Component Value Date/Time    Cholesterol, total 189 03/17/2019 10:47 PM    HDL Cholesterol 70 03/17/2019 10:47 PM    LDL, calculated 60.4 03/17/2019 10:47 PM    Triglyceride 293 (H) 03/17/2019 10:47 PM    CHOL/HDL Ratio 2.7 03/17/2019 10:47 PM     Lab Results   Component Value Date/Time    Glucose (POC) 106 (H) 03/17/2019 10:01 PM     No results found for: COLOR, APPRN, SPGRU, REFSG, MARILYN, PROTU, GLUCU, KETU, BILU, UROU, JERO, LEUKU, GLUKE, EPSU, BACTU, WBCU, RBCU, CASTS, UCRY      Medications Reviewed:     Current Facility-Administered Medications   Medication Dose Route Frequency    ondansetron (ZOFRAN) injection 4 mg  4 mg IntraVENous Q6H PRN    naloxone (NARCAN) injection 0.4 mg  0.4 mg IntraVENous PRN    0.9% sodium chloride infusion  125 mL/hr IntraVENous CONTINUOUS    niCARdipine (CARDENE) 25 mg in 0.9% sodium chloride 250 mL infusion  0-15 mg/hr IntraVENous TITRATE    dorzolamide (TRUSOPT) 2 % ophthalmic solution 1 Drop  1 Drop Right Eye BID    calcium-vitamin D (OS-JOSE ROBERTO) 500 mg-200 unit tablet  1 Tab Oral DAILY    levothyroxine (SYNTHROID) tablet 25 mcg  25 mcg Oral 6am  timolol (TIMOPTIC) 0.5 % ophthalmic solution 1 Drop  1 Drop Right Eye BID    And    brimonidine (ALPHAGAN) 0.2 % ophthalmic solution 1 Drop  1 Drop Right Eye BID    ferrous sulfate tablet 325 mg  1 Tab Oral DAILY    Or    vitamin B complex tablet  1 Tab Oral DAILY    famotidine (PEPCID) tablet 20 mg  20 mg Oral DAILY     ______________________________________________________________________  EXPECTED LENGTH OF STAY: 3d 2h  ACTUAL LENGTH OF STAY:          0                 Luis Klein MD

## 2019-03-18 NOTE — DISCHARGE SUMMARY
Physician Discharge Summary     Patient ID:  Vonnie Miranda  [id number]  29 y.o.  7/3/1928    Admit date: 3/17/2019    Discharge date and time: 3/18/2019    Admission Diagnoses: CVA (cerebral vascular accident) [facility]) [I63.9]    Discharge Diagnoses:    Principal Problem:    CVA (cerebral vascular accident) ([address] (3/17/2019)    Active Problems:    Acquired hypothyroidism (3/17/2019)           Hospital Course:   Ms. Cesar Wilson was admitted for aphasia, right facial droop. She was seen in the ER 30 minutes after onset of symptoms. She was determined to be a tPA candidate and tPA was given. After tPA she developed worsened aphasia. A repeat head CT was performed which noted:  1. New small foci of subarachnoid hemorrhage in the right frontal lobe and right  occipital lobe. 2. New small intraparenchymal hemorrhage in the left occipital lobe.   She was transferred to [facility] for further management      PCP: Tye Bailon MD     Consults: teleneuro      Approximate time spent in patient care on day of discharge: 60 minutes    Signed:  Coy Wilkins MD  3/18/2019  2:15 AM

## 2019-03-18 NOTE — ED NOTES
1:30 AM  Notified by radiology about new [address] on patient. Spoke with admitting doctor. She will be down to see pt. Neurosurgery paged.

## 2019-03-18 NOTE — CONSULTS
89yo with acute onset aphasia and right sided weakness yesterday evening. Presented to [facility] and was given TPA. Follow up head CT early this am showed several small (several mm) areas of ICH/sah without mass effect. She was transferred to [facility] for evaluation. These areas of ICH are very small and do no require surgical intervention. Thank you for this consultation.

## 2019-03-18 NOTE — PROGRESS NOTES
Physical Therapy Note  3/18/19    Orders acknowledged and chart reviewed. Pt admitted to [facility] with aphasia and R sided weakness, tPA initiated ~2240 on 3/17/19. Hemorrhagic conversion noted on repeat scan and pt transferred to [facility]. Will hold mobilization x24hrs per protocol and follow up tomorrow for formal PT evaluation.     Thank you,  Jeniffer Caba, PT, DPT

## 2019-03-18 NOTE — ED TRIAGE NOTES
30min PTA pt began having difficulty finding words. Unable to state name or location. Facial droop on right side. Unable to communicate pain.

## 2019-03-18 NOTE — PROGRESS NOTES
Renal Dosing/Monitoring  Medication: Famotidine   Current regimen:  20 mg PO BID  Recent Labs     03/18/19  0641 03/17/19  2247   CREA 0.74 0.81   BUN 9 12     Estimated CrCl:  ~30-40 ml/min  Plan: Change to 20 mg PO daily per [facility] P&T Committee Protocol with respect to renal function. Pharmacy will continue to monitor patient daily and will make dosage adjustments based upon changing renal function.

## 2019-03-18 NOTE — CONSULTS
[facility] [place] [facility]  Pulmonary, Critical Care, and Sleep Medicine  Name: Aminta Alicea MRN: [id number]   : 7/3/1928 Hospital: [address]   Date: 3/18/2019          80year old female with past medical history as given who presented to [facility] with difficulty speaking and right sided facial asymmetry yesterday on the day of her two son birthday. She was taken to [facility] where a CT head was negative for hemorrhage. She received tPA and was admitted to [facility]. Apparently developed worsening aphasia and a STAT head CT revealed an ICH in the left occipital lobe. Patient was transferred to [facility] for neurosurgical opinion. Denies HA, nausea, vomiting, dyspnea, chest pain, leg pain, leg swelling, cough, sputum. No past medical history on file. Past Surgical History:   Procedure Laterality Date    HX BACK SURGERY      HX CORNEAL TRANSPLANT      HX HIP REPLACEMENT      right    HX HYSTERECTOMY      HX TONSILLECTOMY         FHx: HTN. SHx: Life time non smoker.       Current Facility-Administered Medications:     ondansetron (ZOFRAN) injection 4 mg, 4 mg, IntraVENous, Q6H PRN, Nubia Levine MD    naloxone [facility]) injection 0.4 mg, 0.4 mg, IntraVENous, PRN, Nubia Levine MD    0.9% sodium chloride infusion, 125 mL/hr, IntraVENous, CONTINUOUS, Fernando Levine MD, Last Rate: 125 mL/hr at 19 0652, 125 mL/hr at 19 0652    niCARdipine (CARDENE) 25 mg in 0.9% sodium chloride 250 mL infusion, 0-15 mg/hr, IntraVENous, TITRATE, Nubia Levine MD, Stopped at 19 0600    dorzolamide (TRUSOPT) 2 % ophthalmic solution 1 Drop, 1 Drop, Right Eye, BID, Nubia Levine MD, 1 Drop at 19 1047    levothyroxine (SYNTHROID) tablet 25 mcg, 25 mcg, Oral, 6am, Fernando Levine MD, 25 mcg at 19 1042    timolol (TIMOPTIC) 0.5 % ophthalmic solution 1 Drop, 1 Drop, Right Eye, BID, 1 Drop at 19 1107 **AND** brimonidine (ALPHAGAN) 0.2 % ophthalmic solution 1 Drop, 1 Drop, Right Eye, BID, Collin Galvin MD, 1 Drop at 03/18/19 1107    famotidine (PEPCID) tablet 20 mg, 20 mg, Oral, DAILY, Paresh Castellano MD, 20 mg at 03/18/19 1216         IMPRESSION:   1. CVA  2. ICH post t-PA  3. Expressive aphasia      RECOMMENDATIONS:please see orders for details. Case d/w nursing and on Multi D rounds.    -Imaging per neurosurgery. -neuro checks per tPA protocol  -PT/OT/Speech  -ECHO       Routine management   Stress ulcer prophylaxis  DVT prophylaxis  Discussed with nursing  Daily delirium assessment with CAM - ICU          I have personally reviewed the radiology films and reports. Subjective/Interval History:   I have reviewed the flowsheet and previous days notes. Pt is critically ill and unable to give history. Objective:     Mode Rate Tidal Volume Pressure FiO2 PEEP                    Peak airway pressure:      Minute ventilation:        Vital Signs:    Visit Vitals  /53 (BP 1 Location: Right arm, BP Patient Position: At rest)   Pulse 67   Temp 97.7 °F (36.5 °C)   Resp 15   Ht 5' 7\" (1.702 m)   Wt 47.2 kg (104 lb)   SpO2 96%   BMI 16.29 kg/m²                TMAX(24)      Intake/Output:   Last shift:         Last 3 shifts: 03/18 0701 - 03/18 1900  In: 1125 [I.V.:1125]  Out: 300 [Urine:300]RRIOLAST3    Intake/Output Summary (Last 24 hours) at 3/18/2019 1645  Last data filed at 3/18/2019 1600  Gross per 24 hour   Intake 1160 ml   Output 300 ml   Net 860 ml     EXAM       exam  Other   general Ill appearing/somnolent/ appears stated age    HEENT:  Op moist no ulcers, JVD not elevated, no cervical LAD    Chest No pectus deformity, normal chest rise b/l    HEART:  RRR no m/r/g no rubs    Lungs:  CTA b/l no r/r/w, diminished BS at bases    ABD Soft/NT non rigid mildly distended, hypoactive BS    EXT No c/c/e normal peripheral pulses    Skin No rashes or ulcers, no mottling    Neuro RASS is 0      Data    I have personally reviewed data, flowsheets for the last 24 hours. Labs:  Recent Labs     03/18/19  0641 03/17/19 2247   WBC 7.7 6.0   HGB 14.1 14.4   HCT 41.6 41.6    223     Recent Labs     03/18/19  0641 03/17/19 2247 03/17/19  2202   * 133*  --    K 3.7 3.6  --     99  --    CO2 27 27  --    * 84  --    BUN 9 12  --    CREA 0.74 0.81  --    CA 9.1 8.7  --    MG 2.0  --   --    PHOS 3.2  --   --    ALB 3.9  --   --    SGOT 20  --   --    ALT 19  --   --    INR 1.0 1.0 1.0       ABG No results for input(s): PHI, PO2I, PCO2I in the last 72 hours.      Trever Townsend MD  [facility]

## 2019-03-18 NOTE — PROGRESS NOTES
Neurosurgery Progress Note  Tamy Jensen ACNP-BC  [phone]        Admit Date: 3/18/2019   LOS: 0 days        Daily Progress Note: 3/18/2019      Subjective: The patient was at her twin sons' birthday dinner yesterday when she developed difficulty speaking and right sided facial asymmetry. She also had difficulty lifting her right foot from underneath her chair. The patient has had a history of recent falls, resulting kathleen head injury and right-sided bruising. She had a head CT at [facility] which was negative for hemorrhage. She received tPA at [facility] and was admitted to the hospital. The patient apparently developed worsening aphasia and a STAT head CT was ordered. This revealed an [address] in the left occipital lobe. Neurosurgery was consulted and the patient was transferred to [facility]. She is in the ICU for continued post-tPA treatment and is about to go for an MRI. She states she feels better. Denies HA, nausea, vomiting, dyspnea, chest pain, leg pain, leg swelling. Objective:     Vital signs  Temp (24hrs), Av.5 °F (36.4 °C), Min:97.2 °F (36.2 °C), Max:98 °F (36.7 °C)    07 -  190  In: 625 [I.V.:625]  Out: -    190 -  0700  In: 35 [I.V.:35]  Out: -     Visit Vitals  /61   Pulse 70   Temp 98 °F (36.7 °C)   Resp 16   Ht 5' 7\" (1.702 m)   Wt 47.3 kg (104 lb 4.4 oz)   SpO2 96%   BMI 16.33 kg/m²      O2 Device: Room air     Pain control  Pain Assessment  Pain Scale 1: Numeric (0 - 10)  Pain Intensity 1: 0    PT/OT  Gait                 Physical Exam:  Gen:NAD. Neuro: A&Ox3. Follows commands. Speech mild aphasia. Affect normal.  Left pupil 3 mm and reactive. 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 EOMI. Right central facial droop. Tongue midline. BHATT spontaneously. Mild difficulty raising right leg off bed 5-/5, otherwise strength is 5/5. Negative drift. Some ataxia with left hand. Gait deferred.     CT head without contrast on 19 shows no evidence of acute intracranial abnormality. Generalized parenchymal volume loss and moderate chronic microvascular ischemic disease. CTA head/neck on 03/17/19 shows no evidence of large vessel occlusion or flow-limiting stenosis. No evidence of aneurysm. Scattered atherosclerotic disease. Mild stenosis at the origin of the right vertebral artery. Otherwise no  evidence of significant stenosis of the cervical arterial vasculature. 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 Calcific atherosclerosis of the bilateral carotid bifurcations without significant stenosis. CT head without contrast on 03/18/19 shows new small foci of subarachnoid hemorrhage in the right frontal lobe and right occipital lobe. New small intraparenchymal hemorrhage in the left occipital lobe. Unchanged generalized parenchymal volume loss and severe chronic microvascular ischemic disease.     24 hour results:    Recent Results (from the past 24 hour(s))   GLUCOSE, POC    Collection Time: 03/17/19 10:01 PM   Result Value Ref Range    Glucose (POC) 106 (H) 65 - 100 mg/dL    Performed by Travis Crane [facility])    POC INR    Collection Time: 03/17/19 10:02 PM   Result Value Ref Range    INR (POC) 1.0 <1.2     EKG, 12 LEAD, INITIAL    Collection Time: 03/17/19 10:18 PM   Result Value Ref Range    Ventricular Rate 72 BPM    Atrial Rate 72 BPM    P-R Interval 144 ms    QRS Duration 78 ms    Q-T Interval 404 ms    QTC Calculation (Bezet) 442 ms    Calculated P Axis 69 degrees    Calculated R Axis 23 degrees    Calculated T Axis 55 degrees    Diagnosis       Normal sinus rhythm  Cannot rule out Septal infarct , age undetermined  Abnormal ECG  No previous ECGs available  Confirmed by Ella Wu MD., Miguelito ([id number]) on 3/18/2019 8:37:36 AM     CBC WITH AUTOMATED DIFF    Collection Time: 03/17/19 10:47 PM   Result Value Ref Range    WBC 6.0 3.6 - 11.0 K/uL    RBC 4.38 3.80 - 5.20 M/uL HGB 14.4 11.5 - 16.0 g/dL    HCT 41.6 35.0 - 47.0 %    MCV 95.0 80.0 - 99.0 FL    MCH 32.9 26.0 - 34.0 PG    MCHC 34.6 30.0 - 36.5 g/dL    RDW 12.0 11.5 - 14.5 %    PLATELET 402 725 - 825 K/uL    MPV 9.1 8.9 - 12.9 FL    NRBC 0.0 0  WBC    ABSOLUTE NRBC 0.00 0.00 - 0.01 K/uL    NEUTROPHILS 44 32 - 75 %    LYMPHOCYTES 41 12 - 49 %    MONOCYTES 11 5 - 13 %    EOSINOPHILS 3 0 - 7 %    BASOPHILS 1 0 - 1 %    IMMATURE GRANULOCYTES 0 0.0 - 0.5 %    ABS. NEUTROPHILS 2.6 1.8 - 8.0 K/UL    ABS. LYMPHOCYTES 2.4 0.8 - 3.5 K/UL    ABS. MONOCYTES 0.7 0.0 - 1.0 K/UL    ABS. EOSINOPHILS 0.2 0.0 - 0.4 K/UL    ABS. BASOPHILS 0.1 0.0 - 0.1 K/UL    ABS. IMM.  GRANS. 0.0 0.00 - 0.04 K/UL    DF AUTOMATED     METABOLIC PANEL, BASIC    Collection Time: 03/17/19 10:47 PM   Result Value Ref Range    Sodium 133 (L) 136 - 145 mmol/L    Potassium 3.6 3.5 - 5.1 mmol/L    Chloride 99 97 - 108 mmol/L    CO2 27 21 - 32 mmol/L    Anion gap 7 5 - 15 mmol/L    Glucose 84 65 - 100 mg/dL    BUN 12 6 - 20 MG/DL    Creatinine 0.81 0.55 - 1.02 MG/DL    BUN/Creatinine ratio 15 12 - 20      GFR est AA >60 >60 ml/min/1.73m2    GFR est non-AA >60 >60 ml/min/1.73m2    Calcium 8.7 8.5 - 10.1 MG/DL   PROTHROMBIN TIME + INR    Collection Time: 03/17/19 10:47 PM   Result Value Ref Range    INR 1.0 0.9 - 1.1      Prothrombin time 10.0 9.0 - 11.1 sec   HEMOGLOBIN A1C WITH EAG    Collection Time: 03/17/19 10:47 PM   Result Value Ref Range    Hemoglobin A1c 5.8 4.2 - 6.3 %    Est. average glucose 120 mg/dL   LIPID PANEL    Collection Time: 03/17/19 10:47 PM   Result Value Ref Range    LIPID PROFILE          Cholesterol, total 189 <200 MG/DL    Triglyceride 293 (H) <150 MG/DL    HDL Cholesterol 70 MG/DL    LDL, calculated 60.4 0 - 100 MG/DL    VLDL, calculated 58.6 MG/DL    CHOL/HDL Ratio 2.7 0.0 - 5.0     METABOLIC PANEL, COMPREHENSIVE    Collection Time: 03/18/19  6:41 AM   Result Value Ref Range    Sodium 135 (L) 136 - 145 mmol/L    Potassium 3.7 3.5 - 5.1 mmol/L    Chloride 103 97 - 108 mmol/L    CO2 27 21 - 32 mmol/L    Anion gap 5 5 - 15 mmol/L    Glucose 104 (H) 65 - 100 mg/dL    BUN 9 6 - 20 MG/DL    Creatinine 0.74 0.55 - 1.02 MG/DL    BUN/Creatinine ratio 12 12 - 20      GFR est AA >60 >60 ml/min/1.73m2    GFR est non-AA >60 >60 ml/min/1.73m2    Calcium 9.1 8.5 - 10.1 MG/DL    Bilirubin, total 0.6 0.2 - 1.0 MG/DL    ALT (SGPT) 19 12 - 78 U/L    AST (SGOT) 20 15 - 37 U/L    Alk. phosphatase 65 45 - 117 U/L    Protein, total 7.5 6.4 - 8.2 g/dL    Albumin 3.9 3.5 - 5.0 g/dL    Globulin 3.6 2.0 - 4.0 g/dL    A-G Ratio 1.1 1.1 - 2.2     CBC WITH AUTOMATED DIFF    Collection Time: 03/18/19  6:41 AM   Result Value Ref Range    WBC 7.7 3.6 - 11.0 K/uL    RBC 4.39 3.80 - 5.20 M/uL    HGB 14.1 11.5 - 16.0 g/dL    HCT 41.6 35.0 - 47.0 %    MCV 94.8 80.0 - 99.0 FL    MCH 32.1 26.0 - 34.0 PG    MCHC 33.9 30.0 - 36.5 g/dL    RDW 12.1 11.5 - 14.5 %    PLATELET 949 303 - 989 K/uL    MPV 8.7 (L) 8.9 - 12.9 FL    NRBC 0.0 0  WBC    ABSOLUTE NRBC 0.00 0.00 - 0.01 K/uL    NEUTROPHILS 64 32 - 75 %    LYMPHOCYTES 25 12 - 49 %    MONOCYTES 9 5 - 13 %    EOSINOPHILS 1 0 - 7 %    BASOPHILS 1 0 - 1 %    IMMATURE GRANULOCYTES 0 0.0 - 0.5 %    ABS. NEUTROPHILS 4.9 1.8 - 8.0 K/UL    ABS. LYMPHOCYTES 1.9 0.8 - 3.5 K/UL    ABS. MONOCYTES 0.7 0.0 - 1.0 K/UL    ABS. EOSINOPHILS 0.1 0.0 - 0.4 K/UL    ABS. BASOPHILS 0.0 0.0 - 0.1 K/UL    ABS. IMM.  GRANS. 0.0 0.00 - 0.04 K/UL    DF AUTOMATED     LACTIC ACID    Collection Time: 03/18/19  6:41 AM   Result Value Ref Range    Lactic acid 1.7 0.4 - 2.0 MMOL/L   MAGNESIUM    Collection Time: 03/18/19  6:41 AM   Result Value Ref Range    Magnesium 2.0 1.6 - 2.4 mg/dL   PHOSPHORUS    Collection Time: 03/18/19  6:41 AM   Result Value Ref Range    Phosphorus 3.2 2.6 - 4.7 MG/DL   PROTHROMBIN TIME + INR    Collection Time: 03/18/19  6:41 AM   Result Value Ref Range    INR 1.0 0.9 - 1.1      Prothrombin time 10.1 9.0 - 11.1 sec   TROPONIN I Collection Time: 03/18/19  6:41 AM   Result Value Ref Range    Troponin-I, Qt. <0.05 <0.05 ng/mL          Assessment:     Active Problems:    CVA (cerebral vascular accident) ([facility] (3/17/2019)      Aphasia (3/18/2019)      ICH (intracerebral hemorrhage) ([facility] (3/18/2019)        Plan:   1. Possible multi-focal infarcts   - MRI brain without contrast   - neuro checks per tPA protocol   - CT at 24 hours post-tpa   - PT/OT/Speech   - Lipid panel   - ECHO   - Neurology consult  2. ICH s/p tPA   - neuro checks per protocol   - no surgical intervention at this time   - Doing repeat head CT at 24 hours post-tpa should be sufficienct for repeat head CT. We do not need another one 5 hours later in am.   - MRI brain pending  3. Expressive aphasia   - speech consult  4. Underweight    - Body mass index is 16.33 kg/m². - Nutrition consult    ICH score 1  DVT ppx: s/p tPA  Dispo: tbd    Plan d/w Dr. Colleen Breaux. She will be by to see the patient this afternoon. Discussed code status with patient and son at bedside. The patient replied \"just check me out\" if that were to occur. She wishes to be a DNR. Will change orders to reflect this desire.       Nidhi Vincent NP

## 2019-03-18 NOTE — ROUTINE PROCESS
0320:  TRANSFER - IN REPORT:    Verbal report received from [facility] (name) on [place]  being received from [address] [place] [address] Dr. Rita Steinberg ED (unit) for change in patient condition( hemorrhage s/p TPA)      Report consisted of patients Situation, Background, Assessment and   Recommendations(SBAR). Information from the following report(s) SBAR, Kardex, ED Summary, Procedure Summary, Intake/Output, MAR, Accordion, Recent Results, Med Rec Status, Cardiac Rhythm NSR , Alarm Parameters  and Quality Measures was reviewed with the receiving nurse. Opportunity for questions and clarification was provided. Assessment completed upon patients arrival to unit and care assumed.

## 2019-03-18 NOTE — PROGRESS NOTES
Problem: Dysphagia (Adult)  Goal: *Acute Goals and Plan of Care (Insert Text)  Speech therapy goals  Initiated 3/18/2019   1. Patient will participate in re-evaluation of swallow function within 7 days   Speech LAnguage Pathology bedside swallow evaluation  Patient: Vinay Kwan (12 y.o. female)  Date: 3/18/2019  Primary Diagnosis: ICH (intracerebral hemorrhage) ([facility] [address] [I61.9]  SAH (subarachnoid hemorrhage) ([facility] [address] [I60.9]  Aphasia [R47.01]       Precautions: aspiration, fall       ASSESSMENT :  Based on the objective data described below, the patient presents with mod/severe oral and suspected at least mild/moderate pharyngeal dysphagia. Patient with decreased bolus recognition with max cues to accept limited amounts of purees and ice chips. Absent acceptance of water via cup. With purees, slow and effortful manipulation and posterior propulsion. Suspected delayed swallow initiation with reduced hyolaryngeal elevation/excursion via palpation. Weak cough with ice chips. Aphasia with decreased command following and recognition of PO are limiting factors in safe PO intake at this time. Not yet ready for PO diet. Patient will benefit from skilled intervention to address the above impairments. Patients rehabilitation potential is considered to be Fair  Factors which may influence rehabilitation potential include:   []            None noted  [x]            Mental ability/status  [x]            Medical condition  []            Home/family situation and support systems  [x]            Safety awareness  []            Pain tolerance/management  []            Other:      PLAN :  Recommendations and Planned Interventions:  --recommend NPO except meds crushed in puree. May need cues to open and close mouth around spoon.   Will follow for re-assessment of swallow function as mental status continues to improve as well as for formal language evaluation    Frequency/Duration: Patient will be followed by speech-language pathology 3 times a week to address goals. Discharge Recommendations: To Be Determined     SUBJECTIVE:   Patient alert, cooperative. Limited verbal output, limited command following. OBJECTIVE:   No past medical history on file. Past Surgical History:   Procedure Laterality Date    HX BACK SURGERY      HX CORNEAL TRANSPLANT      HX HIP REPLACEMENT      right    HX HYSTERECTOMY      HX TONSILLECTOMY       Prior Level of Function/Home Situation:      Diet prior to admission: regular   Current Diet:  NPO    Cognitive and Communication Status:  Neurologic State: Eyes open to voice  Orientation Level: Oriented to person, Disoriented to place, Disoriented to situation, Disoriented to time  Cognition: Decreased attention/concentration, Decreased command following  Perception: Appears intact  Perseveration: No perseveration noted  Safety/Judgement: Not assessed  Oral Assessment:  Oral Assessment  Labial: Right droop(slight )  Dentition: Natural  Oral Hygiene: moist mucosa   Lingual: No impairment  Velum: Unable to visualize  Mandible: No impairment  P.O. Trials:  Patient Position: upright in bed   Vocal quality prior to P.O.: No impairment  Consistency Presented: Ice chips; Thin liquid;Puree  How Presented: SLP-fed/presented;Cup/sip;Spoon     Bolus Acceptance: Impaired(decreased recognition of PO items)  Bolus Formation/Control: Impaired  Type of Impairment: Incomplete;Lip closure(verbal cues to close lips around spoon )  Propulsion: Delayed (# of seconds)  Oral Residue: None  Initiation of Swallow: Delayed (# of seconds)  Laryngeal Elevation: Decreased  Aspiration Signs/Symptoms: Weak cough(with ice chips )  Pharyngeal Phase Characteristics: Double swallow; Suspected pharyngeal residue  Effective Modifications: (verbal cues to recognize/accept PO )  Cues for Modifications:  Moderate-maximal  Comments: no attempt to drink from cup    Oral Phase Severity: Moderate-severe  Pharyngeal Phase Severity : Mild-moderate(suspeted at least )    NOMS:   The NOMS functional outcome measure was used to quantify this patient's level of swallowing impairment. Based on the NOMS, the patient was determined to be at level 2 for swallow function       NOMS Swallowing Levels:  Level 1 (CN): NPO  Level 2 (CM): NPO but takes consistency in therapy  Level 3 (CL): Takes less than 50% of nutrition p.o. and continues with nonoral feedings; and/or safe with mod cues; and/or max diet restriction  Level 4 (CK): Safe swallow but needs mod cues; and/or mod diet restriction; and/or still requires some nonoral feeding/supplements  Level 5 (CJ): Safe swallow with min diet restriction; and/or needs min cues  Level 6 (CI): Independent with p.o.; rare cues; usually self cues; may need to avoid some foods or needs extra time  Level 7 ([address] [place] [address]): Independent for all p.o.  SIMI. (2003). National Outcomes Measurement System (NOMS): Adult Speech-Language Pathology User's Guide. Pain:  Pain Scale 1: Numeric (0 - 10)  Pain Intensity 1: 0     After treatment:   []            Patient left in no apparent distress sitting up in chair  [x]            Patient left in no apparent distress in bed  [x]            Call bell left within reach  [x]            Nursing notified  []            Caregiver present  []            Bed alarm activated    COMMUNICATION/EDUCATION:   The patients plan of care including recommendations, planned interventions, and recommended diet changes were discussed with: Registered Nurse. Patient was educated regarding Her deficit(s) of dysphagia as this relates to Her diagnosis of CVA. She demonstrated Guarded understanding as evidenced by limited response to education. [x]            Posted safety precautions in patient's room. [x]            Patient/family have participated as able in goal setting and plan of care. [x]            Patient/family agree to work toward stated goals and plan of care.   []            Patient understands intent and goals of therapy, but is neutral about his/her participation. []            Patient is unable to participate in goal setting and plan of care. Thank you for this referral.  Mihir Beavers M.CD.  CCC-SLP   Time Calculation: 15 mins

## 2019-03-18 NOTE — PROGRESS NOTES
CM at bedside to perform initial assessment; pt sleeping at this time. CM to follow-up as able.      Casandra Gupta RN, BSN  Care Management Department

## 2019-03-18 NOTE — ED PROVIDER NOTES
80 y.o. female with past medical history significant for hypothyroidism, glaucoma, and right sided blindness presents ambulatory with chief complaint of right sided facial asymmetry and speech difficulty that started approximately 25 minutes PTA while at dinner at [facility]. Family also reports right sided weakness, noting that the pt was unable to lift her foot from underneath a chair. Pt's relative further indicates that the pt was ataxic. Family also reports that the pt has had two recent falls, with the most recent one being 4 days ago while exercising, ultimately resulting in a head injury and right sided bruising. Family denies any h/o subdural hemorrhages/hematomas. Of note, the pt's family indicates that the pt does not want to be placed on any \"artificial life support. \" No other symptoms reported. There are no other acute medical concerns at this time. Full history, physical exam, and ROS unable to be obtained due to:  Confusion/acuity of condition. Social hx: Patient denies Tobacco use. Reports 0.5 oz/week of EtOH use. Denies illicit drug abuse. PCP: Leonardo Perkins MD    Note written by [facility], as dictated by Riki Adkins, DO 10:10 PM        The history is provided by the patient. The history is limited by the condition of the patient. No  was used. No past medical history on file. Past Surgical History:   Procedure Laterality Date    HX BACK SURGERY      HX CORNEAL TRANSPLANT      HX HIP REPLACEMENT      right    HX HYSTERECTOMY      HX TONSILLECTOMY           No family history on file.     Social History     Socioeconomic History    Marital status:      Spouse name: Not on file    Number of children: Not on file    Years of education: Not on file    Highest education level: Not on file   Social Needs    Financial resource strain: Not on file    Food insecurity - worry: Not on file   Bernice-Keith insecurity - inability: Not on file    Transportation needs - medical: Not on file   [employer] needs - non-medical: Not on file   Occupational History    Not on file   Tobacco Use    Smoking status: Never Smoker   Substance and Sexual Activity    Alcohol use: Yes     Alcohol/week: 0.5 oz     Types: 1 Glasses of wine per week    Drug use: No    Sexual activity: Not Currently   Other Topics Concern    Not on file   Social History Narrative    Not on file     ALLERGIES: Patient has no known allergies. Review of Systems   Unable to perform ROS: Acuity of condition   Neurological: Positive for facial asymmetry (right sided), speech difficulty and weakness (right sided). Positive for ataxia. Vitals:    03/17/19 2201 03/17/19 2203 03/17/19 2223 03/17/19 2225   BP: 182/70 182/70     Pulse: 84 72     Resp: 18 18     Temp: 97.3 °F (36.3 °C)      SpO2: 98% 97%     Weight: 48.1 kg (106 lb)  57.2 kg (126 lb 1.7 oz) 48.1 kg (106 lb 0.7 oz)   Height: 5' 7\" (1.702 m)               Physical Exam   Constitutional: She appears well-developed and well-nourished. No distress. HENT:   Head: Normocephalic and atraumatic. Right Ear: External ear normal.   Left Ear: External ear normal.   Nose: Nose normal.   Mouth/Throat: Oropharynx is clear and moist. No oropharyngeal exudate. Eyes: Conjunctivae and EOM are normal. Right eye exhibits no discharge. Left eye exhibits no discharge. No scleral icterus. Right pupil is not reactive. Right pupil is round. Left pupil is round and reactive. Pt blind in right eye   Neck: Normal range of motion. Neck supple. No JVD present. No tracheal deviation present. Cardiovascular: Normal rate, regular rhythm, normal heart sounds and intact distal pulses. Exam reveals no gallop and no friction rub. No murmur heard. Pulmonary/Chest: Effort normal and breath sounds normal. No stridor. No respiratory distress. She has no decreased breath sounds. She has no wheezes.  She has no rhonchi. She has no rales. She exhibits no tenderness. Abdominal: Soft. Bowel sounds are normal. She exhibits no distension. There is no tenderness. There is no rebound and no guarding. Musculoskeletal: Normal range of motion. She exhibits no edema or tenderness. Neurological: She is alert. She displays no seizure activity. Mild right facial droop, expressive aphasia. Minimal weakness right upper and lower extremity. Skin: Skin is warm and dry. Capillary refill takes less than 2 seconds. No rash noted. She is not diaphoretic. No erythema. No pallor. Psychiatric: She has a normal mood and affect. Her behavior is normal. Judgment and thought content normal.   Nursing note and vitals reviewed. Note written by Darcel Gaucher, as dictated by Chaz Humphrey DO 10:10 PM     MDM  Number of Diagnoses or Management Options  Cerebrovascular accident (CVA), unspecified mechanism ([facility] [address]):   Expressive aphasia:   Facial droop:      Amount and/or Complexity of Data Reviewed  Clinical lab tests: ordered and reviewed  Tests in the radiology section of CPT®: ordered and reviewed  Tests in the medicine section of CPT®: ordered and reviewed  Obtain history from someone other than the patient: yes  Discuss the patient with other providers: yes (Tele-neuro; hospitalist)  Independent visualization of images, tracings, or specimens: yes (ekg)    Risk of Complications, Morbidity, and/or Mortality  Presenting problems: high  Diagnostic procedures: moderate  Management options: high    Critical Care  Total time providing critical care: 30-74 minutes (Total critical care time spent exclusive of procedures:  45 minutes)    Patient Progress  Patient progress: stable         Procedures  CONSULT NOTE:  10:14 PM Chaz Humphrey DO spoke with Dr. Linette Herron, Consult for Tele-neurology. Discussed available diagnostic tests and clinical findings.   Dr. Linette Herron will see and evaluate the pt.    10:40 PM  Discussed possibility of death, disability, and worsening of symptoms with the patient and family. All in agreement to proceed with tPA. 10:55 PM  Patient is being admitted to the hospital.  The results of their tests and reasons for their admission have been discussed with them and/or available family. They convey agreement and understanding for the need to be admitted and for their admission diagnosis. CONSULT NOTE:  10:55 PM Med Lerner DO spoke with Dr. Claudio Sanders, Consult for Hospitalist.  Discussed available diagnostic tests and clinical findings. Dr. Claudio Sanders will evaluate the patient for hospital admission. Chief Complaint   Patient presents with    Facial Droop       The patient's presenting problems have been discussed, and they are in agreement with the care plan formulated and outlined with them. I have encouraged them to ask questions as they arise throughout their visit.     MEDICATIONS GIVEN:  Medications   sodium chloride (NS) flush 5-40 mL (not administered)   sodium chloride (NS) flush 5-40 mL (not administered)   labetalol (NORMODYNE;TRANDATE) 20 mg/4 mL (5 mg/mL) injection 10 mg (not administered)   sodium chloride (NS) flush 5-40 mL (not administered)   sodium chloride (NS) flush 5-40 mL (not administered)   acetaminophen (TYLENOL) tablet 650 mg (not administered)     Or   acetaminophen (TYLENOL) solution 650 mg (not administered)     Or   acetaminophen (TYLENOL) suppository 650 mg (not administered)   iopamidol (ISOVUE-370) 76 % injection (100 mL  Given 3/17/19 2212)   alteplase (ACTIVASE) bolus dose (4.33 mg IntraVENous Given 3/17/19 2241)   alteplase (ACTIVASE) infusion 38.96 mg (38.96 mg IntraVENous Given 3/17/19 2242)   0.9% sodium chloride infusion 50 mL (0 mL IntraVENous IV Completed 3/18/19 0132)       LABS REVIEWED:  Recent Results (from the past 24 hour(s))   GLUCOSE, POC    Collection Time: 03/17/19 10:01 PM   Result Value Ref Range    Glucose (POC) 106 (H) 65 - 100 mg/dL Performed by Carolina Medina [facility])    POC INR    Collection Time: 03/17/19 10:02 PM   Result Value Ref Range    INR (POC) 1.0 <1.2     EKG, 12 LEAD, INITIAL    Collection Time: 03/17/19 10:18 PM   Result Value Ref Range    Ventricular Rate 72 BPM    Atrial Rate 72 BPM    P-R Interval 144 ms    QRS Duration 78 ms    Q-T Interval 404 ms    QTC Calculation (Bezet) 442 ms    Calculated P Axis 69 degrees    Calculated R Axis 23 degrees    Calculated T Axis 55 degrees    Diagnosis       Normal sinus rhythm  Septal infarct , age undetermined  Abnormal ECG  No previous ECGs available     CBC WITH AUTOMATED DIFF    Collection Time: 03/17/19 10:47 PM   Result Value Ref Range    WBC 6.0 3.6 - 11.0 K/uL    RBC 4.38 3.80 - 5.20 M/uL    HGB 14.4 11.5 - 16.0 g/dL    HCT 41.6 35.0 - 47.0 %    MCV 95.0 80.0 - 99.0 FL    MCH 32.9 26.0 - 34.0 PG    MCHC 34.6 30.0 - 36.5 g/dL    RDW 12.0 11.5 - 14.5 %    PLATELET 875 170 - 292 K/uL    MPV 9.1 8.9 - 12.9 FL    NRBC 0.0 0  WBC    ABSOLUTE NRBC 0.00 0.00 - 0.01 K/uL    NEUTROPHILS 44 32 - 75 %    LYMPHOCYTES 41 12 - 49 %    MONOCYTES 11 5 - 13 %    EOSINOPHILS 3 0 - 7 %    BASOPHILS 1 0 - 1 %    IMMATURE GRANULOCYTES 0 0.0 - 0.5 %    ABS. NEUTROPHILS 2.6 1.8 - 8.0 K/UL    ABS. LYMPHOCYTES 2.4 0.8 - 3.5 K/UL    ABS. MONOCYTES 0.7 0.0 - 1.0 K/UL    ABS. EOSINOPHILS 0.2 0.0 - 0.4 K/UL    ABS. BASOPHILS 0.1 0.0 - 0.1 K/UL    ABS. IMM.  GRANS. 0.0 0.00 - 0.04 K/UL    DF AUTOMATED     METABOLIC PANEL, BASIC    Collection Time: 03/17/19 10:47 PM   Result Value Ref Range    Sodium 133 (L) 136 - 145 mmol/L    Potassium 3.6 3.5 - 5.1 mmol/L    Chloride 99 97 - 108 mmol/L    CO2 27 21 - 32 mmol/L    Anion gap 7 5 - 15 mmol/L    Glucose 84 65 - 100 mg/dL    BUN 12 6 - 20 MG/DL    Creatinine 0.81 0.55 - 1.02 MG/DL    BUN/Creatinine ratio 15 12 - 20      GFR est AA >60 >60 ml/min/1.73m2    GFR est non-AA >60 >60 ml/min/1.73m2    Calcium 8.7 8.5 - 10.1 MG/DL   PROTHROMBIN TIME + INR Collection Time: 03/17/19 10:47 PM   Result Value Ref Range    INR 1.0 0.9 - 1.1      Prothrombin time 10.0 9.0 - 11.1 sec       VITAL SIGNS:  Patient Vitals for the past 12 hrs:   Temp Pulse Resp BP SpO2   03/18/19 0130 97.6 °F (36.4 °C) 78 19 175/80 96 %   03/18/19 0115 97.4 °F (36.3 °C) 75 16 166/63 98 %   03/18/19 0045 97.3 °F (36.3 °C)       03/18/19 0030 97.3 °F (36.3 °C)       03/18/19 0015 97.6 °F (36.4 °C)   145/50    03/18/19 0000 97.4 °F (36.3 °C)       03/17/19 2345 97.4 °F (36.3 °C)   111/57    03/17/19 2331 97.3 °F (36.3 °C)       03/17/19 2330 97.3 °F (36.3 °C)       03/17/19 2315 97.4 °F (36.3 °C)       03/17/19 2300 97.4 °F (36.3 °C)   149/49    03/17/19 2248  71 17 143/54 96 %   03/17/19 2230 97.4 °F (36.3 °C)   143/54    03/17/19 2203  72 18 182/70 97 %   03/17/19 2201 97.3 °F (36.3 °C) 84 18 182/70 98 %   03/17/19 2200    182/70 97 %       RADIOLOGY RESULTS:  The following have been ordered and reviewed:  Ct Head Wo Cont    Result Date: 3/18/2019  EXAM:  CT HEAD WO CONT INDICATION:   Confusion/delirium, altered LOC, unexplained COMPARISON: CT head 3/17/2019. TECHNIQUE: Unenhanced CT of the head was performed using 5 mm images. Brain and bone windows were generated. CT dose reduction was achieved through use of a standardized protocol tailored for this examination and automatic exposure control for dose modulation. FINDINGS: New small foci of subarachnoid hemorrhage seen in the right frontal lobe and right occipital lobe. New small intraparenchymal hemorrhage seen within the left occipital lobe (series 2 image 15). Unchanged generalized parenchymal volume loss with commensurate dilation of the sulci and ventricular system. Unchanged confluent periventricular deep white matter hypodensity, consistent with severe chronic microvascular ischemic disease. Basilar cisterns are patent. No midline shift.  The paranasal sinuses, mastoid air cells, and middle ears are clear. The orbital contents are within normal limits with bilateral lens implants. There are no significant osseous or extracranial soft tissue lesions. IMPRESSION: 1. New small foci of subarachnoid hemorrhage in the right frontal lobe and right occipital lobe. 2. New small intraparenchymal hemorrhage in the left occipital lobe. 3. Unchanged generalized parenchymal volume loss and severe chronic microvascular ischemic disease. The findings were called to Dr. Mike Mckenzie on 3/18/2019 at 1:22 AM by Dr. Emilio Warner. [address] Neuro Head And Neck W Cont    Result Date: 3/17/2019  *PRELIMINARY REPORT* No evidence of large vessel occlusion or flow-limiting stenosis. Preliminary report was provided by Dr. Cornelio Veliz, the on-call radiologist, at 10:38 PM on 3/17/2019. Final report to follow. *END PRELIMINARY REPORT*     Ct Code Neuro Head Wo Contrast    Result Date: 3/17/2019  EXAM:  CT CODE NEURO HEAD WO CONTRAST INDICATION:   code s COMPARISON: None. TECHNIQUE: Unenhanced CT of the head was performed using 5 mm images. Brain and bone windows were generated. CT dose reduction was achieved through use of a standardized protocol tailored for this examination and automatic exposure control for dose modulation. FINDINGS: Generalized parenchymal volume loss with commensurate dilation of the sulci and ventricular system. Scattered periventricular and deep white matter hypodensities, consistent with moderate chronic microangiopathic ischemic changes. Basilar cisterns are patent. No midline shift. There is no evidence of acute infarct, hemorrhage, or extraaxial fluid collection. The paranasal sinuses, mastoid air cells, and middle ears are clear. The orbital contents are within normal limits with bilateral lens implants. There are no significant osseous or extracranial soft tissue lesions. IMPRESSION: 1. No evidence of acute intracranial abnormality.  2. Generalized parenchymal volume loss and moderate chronic microvascular ischemic disease. ED EKG interpretation:  Rhythm: normal sinus rhythm; and regular . Rate (approx.): 72; Axis: normal; P wave: normal; QRS interval: normal ; ST/T wave: normal; Other findings: abnormal ekg, septal infarct. This EKG was interpreted by Ivan Barros DO, ED Provider. CONSULTATIONS:   Tele-neuro; hospitalist    PROGRESS NOTES:  Discussed results and plan with patient. Patient will be admitted/observed for further evaluation and treatment. DIAGNOSIS:    1. Cerebrovascular accident (CVA), unspecified mechanism ([address]    2. Expressive aphasia    3. Facial droop        PLAN:  Admit/obs      ED COURSE: The patient's hospital course has been uncomplicated.

## 2019-03-18 NOTE — H&P
[address]  HISTORY AND PHYSICAL    Name:  Sudheer Suarez  MR#:  [id number]  :  [id number]  ACCOUNT #:  [de-identified]  ADMIT DATE:  2019      CHIEF COMPLAINT:  The patient does not provide. HISTORY OF PRESENT ILLNESS:  This is a 25-year-old white female with past medical history of hypothyroidism, presented as admission/transfer from [facility] Emergency Department to [facility] ICU with initial reports of aphasia, right facial droop with new diagnosis of subarachnoid hemorrhage, intracranial hemorrhage. The patient is a poor historian. As such, majority of history was obtained from proper discussion with the ER MD as well as later discussion with one of the patient's sons and then review of the test as well as the review of ED and her electronic medical records. Per these collective reports, the patient was out at a birthday dinner with her family when she started having difficulty speaking with right facial droop, right-sided weakness and noted aphasia. Symptoms remained constant and severe without specific known exacerbating or alleviating factors. She was transported to [facility] Emergency Department. The workup included CT code neuro head without contrast which initially showed no evidence of acute intracranial abnormality with moderate chronic microvascular ischemic disease. CTA code neuro head and neck with IV contrast showed no evidence of large vessel occlusion or flow-limiting stenosis. The patient was thought to be a tPA candidate. She was given tPA. Unfortunately, the patient had repeat CT of the head without contrast post tPA after having worsening confusion and delirium and loss of consciousness according to the chart records.   CT of the head without contrast did not show a new small foci of subarachnoid hemorrhage in the right frontal lobe and right occipital lobe with new small intraparenchymal hemorrhage in the left occipital lobe and unchanged generalized parenchymal volume loss and severe chronic microvascular ischemic disease. Neurosurgeon was consulted per the ED MD  Request was then for the patient to be transferred to [facility] for continued evaluation and treatment. On current bedside evaluation as mentioned, the patient was having a difficult time of answering questions. Her son notes that the patient has fallen a few days ago. She reportedly [facility] at [facility]. It is unknown if she had any loss of consciousness. PAST MEDICAL HISTORY:  Hypothyroidism. PAST SURGICAL HISTORY:  1.  Back surgery, unspecified. 2.  Corneal transplant. 3.  Right total hip replacement. 4.  History of arachnoids and tonsillectomy. MEDICATIONS:  Medication list reviewed on the chart records. ALLERGIES:  NO KNOWN DRUG ALLERGIES. SOCIAL HISTORY:  No reports of smoking. Positive for occasional alcohol. No reports of illicit drugs. FAMILY HISTORY:  Unknown regard to heart attacks or strokes. REVIEW OF SYSTEMS:  Unable to be obtain a complete 12-system review as the patient is not answering questions appropriately. PHYSICAL EXAMINATION:  GENERAL:  Elderly female in no acute respiratory distress. PSYCH:  The patient is awake, alert, and oriented x1 initially to person and then slowly oriented x2 to person and place. She is disoriented to year, otherwise as a patient not answering questions appropriately. VITAL SIGNS:  Temperature 97.8 degrees Fahrenheit, blood pressure 128/62, heart rate of 86, respiratory rate of 14, O2 saturation is 97% on room air. Recorded weight of 104 pounds (47.3 kg), height of 5 feet 7 inches tall. HEENT:  Normocephalic, atraumatic. Pupils were 2 mm, reactive on the left. There is opacification of the sclerae and conjunctivae on the right. Nares are patent. Oropharynx is clear. Tongue is midline, nonedematous.   NECK:  Supple without lymphadenopathy, JVD, carotid bruits and thyromegaly. Nontender. No acute palpable soft tissue bony deformity. LYMPH:  Negative for cervical, supraclavicular adenopathy. LUNGS:  Clear to auscultation bilaterally. CVS/HEART:  Regular rate and rhythm. Normal S1, S2, without murmurs, rubs, or gallops. GI:  Abdomen is soft. Nontender, nondistended. Normoactive bowel sounds. No rebound, guarding, or rigidity. No auscultated abdominal bruits. No palpable abdominal mass. BACK:  No CVA tenderness. No step-off. MUSCULOSKELETAL:  No acute palpable bony deformity. Negative calf tenderness. VASCULAR:  2+ radial, 1+ dorsalis pedis pulses without cyanosis, clubbing, or edema. SKIN:  Warm and dry. EXTREMITIES:  There is a left hip and lateral proximal thigh hematoma with noted induration and edema. SKIN:  Warm and dry with noted ecchymosis of the left hip and thigh region as mentioned. There is ecchymosis also noted along the left chest wall with some soft tissue contusion. NEUROLOGIC:  GCS of 13, best responses (E4, V3, and M6) with spontaneous opening. Confused speech and follows some, but not all commands. She moves extremities x4. Sensation is grossly intact. No slurred speech or facial droop. Following commands, well enough to check pronator drift. LABORATORY DATA:  Are reviewed, as follows, sodium 133, potassium 3.6, chloride 99, CO2 of 27, BUN of 12, creatinine of 0.81, glucose 84, anion gap is 7, calcium is 8.7, GFR is greater than 60. WBC of 6.0, hemoglobin of 14.4, hematocrit of 41.6, platelets of 022, neutrophils of 44%, INR 1.0, PT of 10.0. CT code neuro head without contrast, CTA code neuro head and neck with IV contrast and repeat CT of the head results were all noted in HPI. A 12-lead EKG shows normal sinus rhythm, ST changes in septal leads at 72 beats per minute. IMPRESSION AND PLAN:  1.   Subarachnoid hemorrhage - in the right frontal lobe and right occipital lobe, in a patient in ICU care and to consult with neurosurgeon. Place a neurovascular check, fall precautions. We will repeat a CT of the head. 2.  Small left intraparenchymal hemorrhage - involving the left occipital lobe. Plan is as noted above. Monitor blood pressure closely and for the same, I placed the patient on Cardene and titrate her IV infusion and to keep her goal systolic blood pressure less than 140 mmHg. 3.  Status post fall - with noted soft tissue contusion on the left lateral chest wall and left hip and thigh region. As such, I have ordered a chest x-ray to evaluate and rule out rib fractures and ordered pelvic as well as left hip x-ray to rule out any acute fracture. 4.  Expressive aphasia. Plan is as noted above. To consult with speech pathologist.  [facility] with neurovascular checks. 5.  Hypothyroidism - check TSH level. 6.  Abnormal EKG - placed an add-on test for troponin levels, continue to limit her monitoring. 7.  Hyponatremia, mild. Repeat sodium levels. 8.  Venous thrombolysis prophylaxis. Sequential compression devices to lower extremities.         Ly Marc MD [id number]/  D:  03/18/2019 7:34  T:  03/18/2019 9:31  JOB #:  [id number]  CC:   (Delete CC field if not dictated.)

## 2019-03-19 LAB
ECHO AO ROOT DIAM: 2.59 CM
ECHO LV E' LATERAL VELOCITY: 8.41 CM/S
ECHO LV E' SEPTAL VELOCITY: 6.48 CM/S
ECHO RV TAPSE: 2.31 CM (ref 1.5–2)

## 2019-03-19 PROCEDURE — 74011250637 HC RX REV CODE- 250/637: Performed by: FAMILY MEDICINE

## 2019-03-19 PROCEDURE — 74011250637 HC RX REV CODE- 250/637: Performed by: INTERNAL MEDICINE

## 2019-03-19 PROCEDURE — 74011250636 HC RX REV CODE- 250/636: Performed by: HOSPITALIST

## 2019-03-19 PROCEDURE — 92526 ORAL FUNCTION THERAPY: CPT | Performed by: SPEECH-LANGUAGE PATHOLOGIST

## 2019-03-19 PROCEDURE — 97161 PT EVAL LOW COMPLEX 20 MIN: CPT

## 2019-03-19 PROCEDURE — 74011250636 HC RX REV CODE- 250/636: Performed by: FAMILY MEDICINE

## 2019-03-19 PROCEDURE — 97530 THERAPEUTIC ACTIVITIES: CPT

## 2019-03-19 PROCEDURE — 65660000000 HC RM CCU STEPDOWN

## 2019-03-19 PROCEDURE — 97116 GAIT TRAINING THERAPY: CPT

## 2019-03-19 PROCEDURE — 97535 SELF CARE MNGMENT TRAINING: CPT

## 2019-03-19 PROCEDURE — 97165 OT EVAL LOW COMPLEX 30 MIN: CPT

## 2019-03-19 PROCEDURE — 77030038269 HC DRN EXT URIN PURWCK BARD -A

## 2019-03-19 RX ORDER — POLYVINYL ALCOHOL 14 MG/ML
1 SOLUTION/ DROPS OPHTHALMIC AS NEEDED
Status: DISCONTINUED | OUTPATIENT
Start: 2019-03-19 | End: 2019-03-21 | Stop reason: HOSPADM

## 2019-03-19 RX ORDER — HYDRALAZINE HYDROCHLORIDE 20 MG/ML
10 INJECTION INTRAMUSCULAR; INTRAVENOUS
Status: DISCONTINUED | OUTPATIENT
Start: 2019-03-19 | End: 2019-03-21 | Stop reason: HOSPADM

## 2019-03-19 RX ADMIN — SODIUM CHLORIDE 125 ML/HR: 900 INJECTION, SOLUTION INTRAVENOUS at 18:35

## 2019-03-19 RX ADMIN — TIMOLOL MALEATE 1 DROP: 5 SOLUTION OPHTHALMIC at 18:29

## 2019-03-19 RX ADMIN — BRIMONIDINE TARTRATE 1 DROP: 2 SOLUTION OPHTHALMIC at 08:30

## 2019-03-19 RX ADMIN — FAMOTIDINE 20 MG: 20 TABLET ORAL at 08:30

## 2019-03-19 RX ADMIN — BRIMONIDINE TARTRATE 1 DROP: 2 SOLUTION OPHTHALMIC at 18:29

## 2019-03-19 RX ADMIN — SODIUM CHLORIDE 125 ML/HR: 900 INJECTION, SOLUTION INTRAVENOUS at 10:55

## 2019-03-19 RX ADMIN — DORZOLAMIDE HYDROCHLORIDE 1 DROP: 20 SOLUTION/ DROPS OPHTHALMIC at 08:30

## 2019-03-19 RX ADMIN — HYDRALAZINE HYDROCHLORIDE 10 MG: 20 INJECTION INTRAMUSCULAR; INTRAVENOUS at 14:03

## 2019-03-19 RX ADMIN — DORZOLAMIDE HYDROCHLORIDE 1 DROP: 20 SOLUTION/ DROPS OPHTHALMIC at 18:29

## 2019-03-19 RX ADMIN — LEVOTHYROXINE SODIUM 25 MCG: 25 TABLET ORAL at 07:06

## 2019-03-19 RX ADMIN — SODIUM CHLORIDE 125 ML/HR: 900 INJECTION, SOLUTION INTRAVENOUS at 03:00

## 2019-03-19 RX ADMIN — TIMOLOL MALEATE 1 DROP: 5 SOLUTION OPHTHALMIC at 08:30

## 2019-03-19 NOTE — PROGRESS NOTES
Reason for Admission:   Transferred from [facility] with right facial droop. CT: left occipital lobe ICH 
               
RRAT Score:  14 Do you (patient/family) have any concerns for transition/discharge? Not at this time Plan for utilizing home health: TBD Likelihood of readmission? Moderate Transition of Care Plan:    TBD Care manager met with patient and her son Jarrod Pearson [phone] to introduce self and explain role. Patient lives alone in her apartment. There is an elevator, but she prefers to take the stairs. Patient confirmed her PCP to be Dr Gómez Guevara and has seen her twice, she uses the local [facility] as her pharmacy. Patient has no previous home health or equipment needs. Will await therapy recommendations to assist with discharge planning. Yamil Crane RN,CRM Care Management Interventions PCP Verified by CM: Yes(Dr Gómez Guevara) Palliative Care Criteria Met (RRAT>21 & CHF Dx)?: No 
MyChart Signup: No 
Discharge Durable Medical Equipment: No 
Physical Therapy Consult: Yes Occupational Therapy Consult: Yes Speech Therapy Consult: Yes Current Support Network: Lives Alone(Son Jarrod Pearson [phone])

## 2019-03-19 NOTE — PROGRESS NOTES
Problem: Dysphagia (Adult) Goal: *Acute Goals and Plan of Care (Insert Text) Speech therapy goals Initiated 3/18/2019 1. Patient will participate in re-evaluation of swallow function within 7 days  MET 3/19/2019 Speech language pathology dysphagia treatment/discharge Patient: Jamarcus Chacon (57 y.o. female) Date: 3/19/2019 Diagnosis: ICH (intracerebral hemorrhage) (HCC) [I61.9] SAH (subarachnoid hemorrhage) ([address] [I60.9] Aphasia [R47.01] <principal problem not specified> Precautions: fall ASSESSMENT: 
Patient with resolved dysphagia. Timely and complete mastication, timely swallow initiation and functional hyolaryngeal elevation/excursion via palpation. Patient fluent in conversation and reports that she feels \"almost 100% normal\" with some slight word finding difficulties in complex conversation. Will follow for formal language evaluation if deficits do not further resolve. Progression toward goals: 
[]           Improving appropriately and progressing toward goals [x]           Improving slowly and progressing toward goals 
[]           Not making progress toward goals and plan of care will be adjusted PLAN: 
--regular diet. Will follow peripherally for possible language evaluation if aphasia is not completely resolved - patient reports rapid improvement with \"almost 100%\" resolution Patient will be discharged from acute skilled speech therapy at this time for dysphagia. Rationale for discharge: 
[x]      Goals Achieved 
[]      Rosy Draper 
[]      Patient not participating in therapy 
[]      Other: 
Discharge Recommendations:  Outpatient SUBJECTIVE:  
Patient stated I was that bad yesterday? Wow I had no idea! . When educated regarding evaluation results yesterday OBJECTIVE:  
Cognitive and Communication Status: 
Neurologic State: Alert Orientation Level: Oriented X4 Cognition: Follows commands Perception: Appears intact Perseveration: No perseveration noted Safety/Judgement: Awareness of environment Dysphagia Treatment: 
Oral Assessment: 
Oral Assessment Labial: No impairment Dentition: Natural 
Oral Hygiene: moist mucosa Lingual: No impairment Velum: No impairment Mandible: No impairment P.O. Trials: 
Patient Position: upright in bed Vocal quality prior to P.O.: No impairment Consistency Presented: Thin liquid;Puree; Solid How Presented: Self-fed/presented;Cup/sip;Cup/gulp;Straw;Successive swallows;Spoon Bolus Acceptance: No impairment Bolus Formation/Control: No impairment Propulsion: No impairment Oral Residue: None Initiation of Swallow: No impairment Laryngeal Elevation: Functional 
Aspiration Signs/Symptoms: None Pharyngeal Phase Characteristics: No impairment, issues, or problems Oral Phase Severity: No impairment Pharyngeal Phase Severity : No impairment Exercises: 
Laryngeal Exercises: 
  
  
  
  
  
  
  
  
  
  
  
  
  
  
  
  
  
  
  
  
  
  
  
  
  
  
  
  
  
  
  
  
  
  
  
  
  
  
  
  
  
  
  
 
NOMS:  
The NOMS functional outcome measure was used to quantify this patient's level of swallowing impairment. Based on the NOMS, the patient was determined to be at level 7 for swallow function NOMS Swallowing Levels: 
Level 1 (CN): NPO Level 2 (CM): NPO but takes consistency in therapy Level 3 (CL): Takes less than 50% of nutrition p.o. and continues with nonoral feedings; and/or safe with mod cues; and/or max diet restriction Level 4 (CK): Safe swallow but needs mod cues; and/or mod diet restriction; and/or still requires some nonoral feeding/supplements Level 5 (CJ): Safe swallow with min diet restriction; and/or needs min cues Level 6 (CI): Independent with p.o.; rare cues; usually self cues; may need to avoid some foods or needs extra time Level 7 (CH): Independent for all p.o. SIMI. (2003).  National Outcomes Measurement System (NOMS): Adult Speech-Language Pathology User's Guide. Pain: 
Pain Scale 1: Numeric (0 - 10) Pain Intensity 1: 0 After treatment:  
[]                Patient left in no apparent distress sitting up in chair 
[x]                Patient left in no apparent distress in bed 
[x]                Call bell left within reach [x]                Nursing notified 
[]                Caregiver present 
[]                Bed alarm activated COMMUNICATION/EDUCATION:  
Patient was educated regarding Her functional swallow as this relates to Her diagnosis of CVA. She demonstrated Good understanding as evidenced by verbalization of understanding . The patients plan of care including recommendations, planned interventions, and recommended diet changes were discussed with: Registered Nurse. Wili Peraza M.CD. CCC-SLP Time Calculation: 14 mins

## 2019-03-19 NOTE — PROGRESS NOTES
Problem: Self Care Deficits Care Plan (Adult) Goal: *Acute Goals and Plan of Care (Insert Text) Occupational Therapy Goals Initiated 3/19/2019 1. Patient will perform grooming standing at sink for 5 minutes with supervision/set-up within 7 day(s). 2.  Patient will perform upper body ADLs with independence within 7 day(s). 3.  Patient will perform lower body ADLs with supervision/set-up within 7 day(s). 4.  Patient will perform toilet transfers with supervision/set-up within 7 day(s). 5.  Patient will perform all aspects of toileting with supervision/set-up within 7 day(s). 6.  Patient will participate in upper extremity therapeutic exercise/activities with independence for 5 minutes within 7 day(s). 7.  Patient will utilize energy conservation techniques during functional activities with verbal cues within 7 day(s). Occupational Therapy EVALUATION Patient: Sedrick Meza (84 y.o. female) Date: 3/19/2019 Primary Diagnosis: I[address] (intracerebral hemorrhage) ([facility] [I61.9] SAH (subarachnoid hemorrhage) ([facility] [I60.9] Aphasia [R47.01] Precautions: falls (SBP<140) ASSESSMENT : 
Based on the objective data described below, the patient presents with Setup upper body ADLs, Minimum assistance lower body ADLs, and Minimum assistance - Moderate Assistance for functional mobility s/p admission for ICH and SAH. Noated MRI showed ICH of L occipital lobe. Patient completed bed level evaluation today. Patient ADLs limited by SBP parameters (SBP<140) and impaired balance. Patient BUE ROM, strength, vision, FM/GM coordination and sensation WFL, Fugl Campos assessment not indicated at this time. Patient need for f/u services TBD pending continued medical management and progress with acute rehab. The following are barriers to independence while in acute care:  
- Cognitive and/or behavioral: attention to task, sequencing, safety awareness and insight into deficits - Medical condition: functional reach, functional endurance, sitting balance, standing balance and precautions   
- Other:    
 
Patient will benefit from skilled acute intervention to address the above impairments. Patients rehabilitation potential is considered to be Good Discharge recommendations: To be determined between the prior selections, based on patient progress during hospital stay If above is not an option then recommend: To be determined between the prior selections, based on patient progress during hospital stay Barriers to discharging home, in addition to above listed impairments: lives alone 
family availability to assist 
level of physical assist required to maintain patient safety. Equipment recommendations for successful discharge (if) home: TBD - will continue to assess PLAN : 
Recommendations and Planned Interventions: self care training, functional mobility training, therapeutic exercise, balance training, therapeutic activities, endurance activities, patient education, home safety training and family training/education Frequency/Duration: Patient will be followed by occupational therapy 5 times a week to address goals. SUBJECTIVE:  
Patient stated I really don't like this thing.  (referring to [address]) OBJECTIVE DATA SUMMARY:  
HISTORY:  
No past medical history on file. Past Surgical History:  
Procedure Laterality Date  HX BACK SURGERY    
 HX CORNEAL TRANSPLANT  HX HIP REPLACEMENT    
 right  HX HYSTERECTOMY  HX TONSILLECTOMY Prior Level of Function/Environment/Context: Patient lives alone in [address] and was IND/mod I PTA. Patient has grab bars and uses a cane when she is hiking, otherwise no DME. Patient reports having a Life Alert type system in her apartment. Has supportive son and friends in community. Home Situation Home Environment: Independent living([facility]) One/Two Story Residence: One story Living Alone: No 
 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 
Cognitive/Behavioral Status: 
Neurologic State: Alert Orientation Level: Oriented X4 Cognition: Appropriate for age attention/concentration; Follows commands Perception: Appears intact Perseveration: No perseveration noted Safety/Judgement: Awareness of environment Skin: Appears grossly intact Edema: none noted in BUEs Hearing: Auditory Auditory Impairment: None Vision/Perceptual:   
Tracking: Able to track stimulus in all quadrants w/o difficulty Diplopia: No   
Acuity: Able to read employee name badge without difficulty; Able to read newspaper without difficulty; Able to read clock/calendar on wall without difficulty; Within Defined Limits Corrective Lenses: Glasses Range of Motion: In [id number] AROM: Within functional limits Strength: In [facility] [address] Strength: Within functional limits Coordination: 
Coordination: Within functional limits Fine Motor Skills-Upper: Left Intact; Right Intact Gross Motor Skills-Upper: Left Intact; Right Intact Tone & Sensation: In [id number] Tone: Normal 
Sensation: Intact Balance: 
Sitting: Impaired Sitting - Static: Good (unsupported) Sitting - Dynamic: Fair (occasional) Standing: Impaired; With support Standing - Static: Constant support; Omelia Beady Functional Mobility and Transfers for ADLs:Bed Mobility: 
Rolling: Stand-by assistance;Bed Modified(HOB elevated) Supine to Sit: Contact guard assistance;Bed Modified(HOB elevated) Sit to Supine: Stand-by assistance Scooting: Maximum assistance; Additional time;Assist x2(to scoot to [facility]) Transfers: 
Sit to Stand: Moderate assistance; Additional time;Assist x1(to clear bottom ansd scoot to [facility], not to full stand) Toilet Transfer : (NT 2* elevated SBP, Infer min-mod Ax1) ADL Assessment: 
Feeding: Setup(Infer per obs of func reach, BUE ROm, FM/GM coordination) Oral Facial Hygiene/Grooming: Setup Bathing: Minimum assistance(Infer per obs of func mob, balance, strength) Upper Body Dressing: Setup(Infer per obs of func reach, BUE ROm, FM/GM coordination) Lower Body Dressing: Minimum assistance(Infer per obs of func reach, balance and strength) Toileting: Minimum assistance(Infer per obs of func reach, balance and strength) ADL Intervention and task modifications: 
Grooming Washing Face: Supervision/set-up Brushing Teeth: Supervision/set-up Lower Body Dressing Assistance Socks: Minimum assistance Leg Crossed Method Used: Yes Position Performed: Seated edge of bed Cues: Don;Doff;Physical assistance;Verbal cues provided Cognitive Retraining Safety/Judgement: Awareness of environment Functional Measure: 
Barthel Index: 
 
Bathin Bladder: 5 Bowels: 10 
Groomin Dressin Feeding: 10 Mobility: 5 Stairs: 0 Toilet Use: 5 Transfer (Bed to Chair and Back): 5 Total: 50/100 Percentage of impairment  
0% 1-19% 20-39% 40-59% 60-79% 80-99% 100% Barthel Score 0-100 100 99-80 79-60 59-40 20-39 1-19 
 0 The Barthel ADL Index: Guidelines 1. The index should be used as a record of what a patient does, not as a record of what a patient could do. 2. The main aim is to establish degree of independence from any help, physical or verbal, however minor and for whatever reason. 3. The need for supervision renders the patient not independent. 4. A patient's performance should be established using the best available evidence. Asking the patient, friends/relatives and nurses are the usual sources, but direct observation and common sense are also important. However direct testing is not needed. 5. Usually the patient's performance over the preceding 24-48 hours is important, but occasionally longer periods will be relevant. 6. Middle categories imply that the patient supplies over 50 per cent of the effort. 7. Use of aids to be independent is allowed. Suzie Dunn., Barthel, D.W. (5346). Functional evaluation: the Barthel Index. [address] [facility] (14)2. Genovevachun Matt lewis GREGORIO Shannon, Tomi Paredes., Kiara Miner., [place], [address] (1999). Measuring the change indisability after inpatient rehabilitation; comparison of the responsiveness of the Barthel Index and Functional [place] Measure. Journal of Neurology, Neurosurgery, and Psychiatry, 66(4), 277-840. QUYEN Slater, GRACE Ballard, & Kiesha Healy M.A. (2004.) Assessment of post-stroke quality of life in cost-effectiveness studies: The usefulness of the Barthel Index and the EuroQoL-5D. [facility], 13, 177-64 Occupational Therapy Evaluation Charge Determination History Examination Decision-Making LOW Complexity : Brief history review  LOW Complexity : 1-3 performance deficits relating to physical, cognitive , or psychosocial skils that result in activity limitations and / or participation restrictions  MEDIUM Complexity : Patient may present with comorbidities that affect occupational performnce. Miniml to moderate modification of tasks or assistance (eg, physical or verbal ) with assesment(s) is necessary to enable patient to complete evaluation Based on the above components, the patient evaluation is determined to be of the following complexity level: LOW Pain: 
Pre treatment: 0 /10 During treatment: 0/10 Post treatment:  0/10 Location: none Description:none Aggravating factors: Activity Tolerance:  
Fair Please refer to the flowsheet for vital signs taken during this treatment. After treatment patient left:  
Call light within reach RN notified Side rails x 3 
 sitting upright in bed for oral care. COMMUNICATION/EDUCATION:  
The patients plan of care was discussed with: Physical Therapist and Registered Nurse. Home safety education was provided and the patient/caregiver indicated understanding. and Patient/family have participated as able in goal setting and plan of care. This patients plan of care is appropriate for delegation to \[facility]\"". Thank you for this referral. 
Sherrell Stone, OT Time Calculation: 34 mins

## 2019-03-19 NOTE — PROGRESS NOTES
Problem: Mobility Impaired (Adult and Pediatric) Goal: *Acute Goals and Plan of Care (Insert Text) Physical Therapy Goals Initiated 3/19/2019 1. Patient will move from supine to sit and sit to supine  and scoot up and down in bed with supervision/set-up within 7 day(s). 2.  Patient will transfer from bed to chair and chair to bed with minimal assistance/contact guard assist using the least restrictive device within 7 day(s). 3.  Patient will perform sit to stand with minimal assistance/contact guard assist within 7 day(s). 4.  Patient will ambulate with minimal assistance/contact guard assist for 150 feet with the least restrictive device within 7 day(s). 5.  Patient will improve Holland Balance score by 7 points within 7 days. physical Therapy EVALUATION Patient: Torey Garibay (52 y.o. female) Date: 3/19/2019 Primary Diagnosis: ICH (intracerebral hemorrhage) ([facility] [address] [I61.9] SAH (subarachnoid hemorrhage) ([facility] [address] [I60.9] Aphasia [R47.01] Precautions:   Fall, DNR 
 
ASSESSMENT :  
Based on the objective data described below, the patient presented with impaired functional mobility as compared to baseline level 2* decreased attention/concentration, short term memory deficits, intermittent word finding difficulties, impaired sense of midline, impaired gross motor planning, impaired balance, and impaired gait following admission for CVA. Pt is s/p tPA with hemorraghic conversion present on repeat imaging (R frontal and occipital, L occipital). Prior to this admission, pt reports that she lived alone in [address] [place] [address] and was independent with all ADLs and mobility -very active and did not use an AD unless \"out hiking\".  Today, she required up to min A for bed mobility; mod A for sit<>stands; mod A for transfers; and mod A x2 for initiation of gait training with noted strong posterior lean, NBOS, decreased step lengths, mild ataxia, and + LOBs - required constant assist to facilitate fwd translation of COG over CAROLYN and was seemingly unaware of posterior lean. She is a very high falls risk and significantly below her baseline level - currently not safe to discharge home alone from cognitive or physical standpoint. Strongly recommend discharge to acute IP rehab setting - discussed at length with pt and son (who are in agreement) and answered numerous questions. Will follow. The following are barriers to independence while in acute care:  
-Cognitive and/or behavioral: attention to task and short term memory loss 
-Medical condition: strength, sitting balance, standing balance, medical history, motor planning, proprioception and coordination   
-Other:    
 
The patient will benefit from skilled acute intervention to address the above impairments and their rehabilitation potential is considered to be Good Discharge recommendations: Rehab at inpatient facility: patient can tolerate 3 hours of therapy If above is not an option then recommend: Rehab at skilled nursing facility (SNF) Patient's barriers to discharging home, in addition to above impairments: lives alone 
history of falls 
total assist driving to follow up medical appointment(s)/groceries/obtain medication 
level of physical assist required to maintain patient safety. Equipment recommendations for successful discharge (if) home: TBD PLAN : 
Recommendations and Planned Interventions: bed mobility training, transfer training, gait training, exercises, neuromuscular re-education and patient and family training/education Frequency/Duration: Patient will be followed by physical therapy  5 times a week to address goals. SUBJECTIVE:  
Patient stated Oh my, I couldn't walk, my balance is terrible.  OBJECTIVE DATA SUMMARY:  
HISTORY:   
No past medical history on file. Past Surgical History:  
Procedure Laterality Date  HX BACK SURGERY    
 HX CORNEAL TRANSPLANT  HX HIP REPLACEMENT    
 right  HX HYSTERECTOMY  HX TONSILLECTOMY Prior Level of Function/Home Situation: lives alone in [address] [place] [address] apartment and was indep with all ADLs and mobility w/o AD- reports very active and attends an exercise class Personal factors and/or comorbidities impacting plan of care: PMHx Home Situation Home Environment: Independent living # Steps to Enter: 0 One/Two Story Residence: One story Living Alone: Yes Support Systems: Family member(s) Patient Expects to be Discharged to[de-identified] Rehabilitation facility Current DME Used/Available at Home: Cane, straight(\"only while hiking\") Tub or Shower Type: Shower EXAMINATION/PRESENTATION/DECISION MAKING: Critical Behavior: 
Neurologic State: Alert Orientation Level: Oriented X4 Cognition: Decreased attention/concentration, Follows commands, Memory loss Safety/Judgement: Awareness of environment, Insight into deficits Hearing: Auditory Auditory Impairment: NoneSkin:  Very fragile Edema: none noted Range Of Motion: 
AROM: Within functional limits Strength:   
Strength: Generally decreased, functional 
  
  
Tone & Sensation:  
Tone: Normal 
Sensation: Intact(except B \"feet feel asleep\") Coordination: 
Coordination: Generally decreased, functional 
Vision:  
Tracking: Able to track stimulus in all quadrants w/o difficulty Diplopia: No 
Acuity: Able to read employee name badge without difficulty; Able to read newspaper without difficulty; Able to read clock/calendar on wall without difficulty; Within Defined Limits Corrective Lenses: Glasses Functional Mobility: 
Bed Mobility: 
Rolling: Stand-by assistance;Bed Modified(HOB elevated) Supine to Sit: Contact guard assistance Sit to Supine: Minimum assistance Transfers: 
Sit to Stand: Additional time; Moderate assistance Stand to Sit: Moderate assistance Balance:  
Sitting: Impaired Sitting - Static: Good (unsupported) Sitting - Dynamic: Fair (occasional) Standing: Impaired; With support Standing - Static: Poor;Constant support Standing - Dynamic : PoorAmbulation/Gait Training:Distance (ft): 20 Feet (ft) Assistive Device: Gait belt(B HHA) Ambulation - Level of Assistance: Moderate assistance;Assist x2 Gait Description (WDL): Exceptions to [facility] Gait Abnormalities: Decreased step clearance; Path deviations; Shuffling gait;Trunk sway increased Base of Support: Narrowed; Center of gravity altered Speed/Latia: Slow Step Length: Left shortened;Right shortened Functional Measure: 
George Mike Balance Test: 
 
Sitting to Standin Standing Unsupported: 0 Sitting with Back Unsupported: 2 Standing to Sittin Transfers: 0 Standing Unsupported with Eyes Closed: 0 Standing Unsupported with Feet Together: 0 Reach Forward with Outstretched Arm: 0  Object: 0 Turn to Look Over Shoulders: 0 Turn 360 Degrees: 0 Alternate Foot on Step/Stool: 0 Standing Unsupported One Foot in Front: 0 Stand on One Le Total: 2 
 
 
 
56=Maximum possible score;  
0-20=High fall risk 21-40=Moderate fall risk 41-56=Low fall risk Physical Therapy Evaluation Charge Determination History Examination Presentation Decision-Making HIGH Complexity :3+ comorbidities / personal factors will impact the outcome/ POC  MEDIUM Complexity : 3 Standardized tests and measures addressing body structure, function, activity limitation and / or participation in recreation  LOW Complexity : Stable, uncomplicated  HIGH Complexity : FOTO score of 1- 25 Based on the above components, the patient evaluation is determined to be of the following complexity level: LOW Activity Tolerance:  
Good Please refer to the flowsheet for vital signs taken during this treatment. After treatment patient left: In modified chair position with B feet supported Supine in bed Bed alarm/tab alert on Call light within reach RN notified Family at bedside COMMUNICATION/EDUCATION:  
 The patients plan of care was discussed with: Registered Nurse. Patient was educated regarding her deficit(s) of impaired balance as this relates to her diagnosis of CVA. She demonstrated Good understanding as evidenced by nodding. Patient and/or family was verbally educated on the BE FAST acronym for signs/symptoms of CVA and TIA. BE FAST was written on patient's communication board  for visual education and reinforcement. All questions answered with patient indicating good understanding. Fall prevention education was provided and the patient/caregiver indicated understanding., Patient/family have participated as able in goal setting and plan of care. and Patient/family agree to work toward stated goals and plan of care. Thank you for this referral. 
Elisha Balderas, PT, DPT Time Calculation: 60 mins

## 2019-03-19 NOTE — PROGRESS NOTES
NUTRITION COMPLETE ASSESSMENT 
 
RECOMMENDATIONS:  
Encourage oral intake Consume supplements at end or between meals Document meal/supplement intake on flow sheet Interventions/Plan:  
Food/Nutrient Delivery:    Commercial supplements Assessment:  
Reason for Assessment:  
[x] Provider Consult-General nutrition management and supplements Diet: Cardiac Nutritionally Significant Medications: [x] Reviewed & Includes: NS @ 100 ml/hr, Synthroid Meal Intake: No data found. Subjective: 
I'm ready to get going (to rehab). I don't usually talk about wanting to gain weight because so many people are trying to do the opposite. Objective: Ms Tc Faye was admitted with Aphasia. Noted: CVA; SAH 2/2 TPA; expressive aphasia resolved. PMHx: Hypothyroidism, others noted. Diet advanced today to regular per SLP. Patient hungry and happy to have a late lunch. UBW appears to be in the low to mid 100's per weight trends in EHR. Ms Tc Faye notes that a lot of those weights include her clothes and shoes-she weighs herself daily (UBW closer to 100-102#). No significant weight loss PTA; MST negative on admission. Will add a variety of Ensure supplements for pt to try (Ensure Clear, Ensure Compact and Ensure Enlive)-encouraged pt to drink these after discharge as well. Patient does not have a specific weight goal. 
 
Estimated Nutrition Needs:  
Kcals/day: 1200 Kcals/day(MSJ x 1.3) Protein: 47 g(1g/kg) Fluid: (1ml/kcal) Based On: [place] 1898 Weight Used: Actual wt(47 kg) Pt expected to meet estimated nutrient needs:  [x]   Yes     []  No  [] Unable to predict at this time Nutrition Diagnosis:  
1. Underweight related to advanced age/? UBW as evidenced by 77% IBW. Goals:   
 Weight maintenance x 5-7 days. Monitoring & Evaluation: - Total energy intake - Weight/weight change Previous Nutrition Goals Met: N/A Previous Recommendations: N/A 
 
 Education & Discharge Needs: 
 [x] None Identified 
 [] Identified and addressed [x] Participated in care plan, discharge planning, and/or interdisciplinary rounds Cultural, Jain and ethnic food preferences identified: 
 None Skin Integrity: [x]Intact  []Other Edema: [x]None []Other Last BM: PTA Food Allergies: [x]None []Other Anthropometrics:   
Weight Loss Metrics 3/19/2019 3/18/2019 3/17/2019 1/9/2014 7/17/2013 5/21/2013 4/19/2013 Today's Wt 104 lb - 106 lb 0.7 oz 106 lb 106 lb 9.6 oz 107 lb 9.6 oz 107 lb 3.2 oz  
BMI - 16.29 kg/m2 16.61 kg/m2 19.38 kg/m2 19.49 kg/m2 19.68 kg/m2 19.6 kg/m2 Last 3 Recorded Weights in this Encounter 03/18/19 0425 03/18/19 1512 03/19/19 1052 Weight: 47.3 kg (104 lb 4.4 oz) 47.2 kg (104 lb) 47.2 kg (104 lb) Weight Source: Bed Height: 5' 7\" (170.2 cm), Body mass index is 16.29 kg/m². IBW : 61.2 kg (135 lb), % IBW (Calculated): 77.04 % 
 ,   
 
Labs:   
Lab Results Component Value Date/Time Sodium 135 (L) 03/18/2019 06:41 AM  
 Potassium 3.7 03/18/2019 06:41 AM  
 Chloride 103 03/18/2019 06:41 AM  
 CO2 27 03/18/2019 06:41 AM  
 Glucose 104 (H) 03/18/2019 06:41 AM  
 BUN 9 03/18/2019 06:41 AM  
 Creatinine 0.74 03/18/2019 06:41 AM  
 Calcium 9.1 03/18/2019 06:41 AM  
 Magnesium 2.0 03/18/2019 06:41 AM  
 Phosphorus 3.2 03/18/2019 06:41 AM  
 Albumin 3.9 03/18/2019 06:41 AM  
 
Lab Results Component Value Date/Time Hemoglobin A1c 5.8 03/17/2019 10:47 PM  
 
Lab Results Component Value Date/Time Glucose (POC) 106 (H) 03/17/2019 10:01 PM  
  
Lab Results Component Value Date/Time ALT (SGPT) 19 03/18/2019 06:41 AM  
 AST (SGOT) 20 03/18/2019 06:41 AM  
 Alk.  phosphatase 65 03/18/2019 06:41 AM  
 Bilirubin, total 0.6 03/18/2019 06:41 AM  
  
 
Crow Leblanc, RD CNSC

## 2019-03-19 NOTE — PROGRESS NOTES
Orders received, chart reviewed and patient evaluated by occupational therapy. Recommend patient to discharge TBD pending progression with skilled acute occupational therapy. Recommend with nursing patient to complete as able in order to maintain strength, endurance and independence: OOB to chair 3x/day, ADLs with supervision/setup and mobilizing to the [facility] for toileting with 2 assist as SBP allows (with gait belt). Thank you for your assistance. Full evaluation to follow.

## 2019-03-19 NOTE — PROGRESS NOTES
[facility] Name: Rufus Slaughter MRN: [id number] : 7/3/1928 Hospital: [address] Date: 3/19/2019 3/19: 
No complaints. Speech much better. 
 
=============== 
 
80year old female with past medical history as given who presented to [facility] with difficulty speaking and right sided facial asymmetry yesterday on the day of her two son birthday. She was taken to [facility] where a CT head was negative for hemorrhage. She received tPA and was admitted to [facility]. Apparently developed worsening aphasia and a STAT head CT revealed an ICH in the left occipital lobe. Patient was transferred to [facility] for neurosurgical opinion. Denies HA, nausea, vomiting, dyspnea, chest pain, leg pain, leg swelling, cough, sputum. No past medical history on file. Past Surgical History:  
Procedure Laterality Date  HX BACK SURGERY    
 HX CORNEAL TRANSPLANT  HX HIP REPLACEMENT    
 right  HX HYSTERECTOMY  HX TONSILLECTOMY FHx: HTN. SHx: Life time non smoker. Current Facility-Administered Medications:  
  hydrALAZINE (APRESOLINE) 20 mg/mL injection 10 mg, 10 mg, IntraVENous, Q6H PRN, Tom Mandujano MD 
  ondansetron (ZOFRAN) injection 4 mg, 4 mg, IntraVENous, Q6H PRN, Trever Levine MD 
  naloxone [facility]) injection 0.4 mg, 0.4 mg, IntraVENous, PRN, Trever Levine MD 
  0.9% sodium chloride infusion, 125 mL/hr, IntraVENous, CONTINUOUS, Fernando Levine MD, Last Rate: 125 mL/hr at 19 1055, 125 mL/hr at 19 1055   niCARdipine (CARDENE) 25 mg in 0.9% sodium chloride 250 mL infusion, 0-15 mg/hr, IntraVENous, TITRATE, Trever Levine MD, Stopped at 19 0600   dorzolamide (TRUSOPT) 2 % ophthalmic solution 1 Drop, 1 Drop, Right Eye, BID, Trever Levine MD, 1 Drop at 19 0874   levothyroxine (SYNTHROID) tablet 25 mcg, 25 mcg, Oral, 6am, Abner, Trever Acuña MD, 25 mcg at 19 5607   timolol (TIMOPTIC) 0.5 % ophthalmic solution 1 Drop, 1 Drop, Right Eye, BID, 1 Drop at 03/19/19 0830 **AND** brimonidine (ALPHAGAN) 0.2 % ophthalmic solution 1 Drop, 1 Drop, Right Eye, BID, Juvenal Levine MD, 1 Drop at 03/19/19 0830   famotidine (PEPCID) tablet 20 mg, 20 mg, Oral, DAILY, Mulugeta Delatorre MD, 20 mg at 03/19/19 0830 IMPRESSION:  
1. CVA 2. ICH post t-PA 3. Expressive aphasia RECOMMENDATIONS:please see orders for details. Case d/w nursing and on Multi D rounds.   
-Imaging per neurosurgery. -neuro checks per tPA protocol 
-PT/OT/Speech -ECHO Tx to floor. Subjective/Interval History:  
I have reviewed the flowsheet and previous days notes. Pt is critically ill and unable to give history. Objective: Mode Rate Tidal Volume Pressure FiO2 PEEP Peak airway pressure:     
Minute ventilation:     
Vital Signs:   
Visit Vitals /57 (BP 1 Location: Right arm, BP Patient Position: At rest) Pulse 67 Temp 97 °F (36.1 °C) Resp 19 Ht 5' 7\" (1.702 m) Wt 47.2 kg (104 lb) SpO2 99% BMI 16.29 kg/m² TMAX(24) Intake/Output:  
Last shift:        
Last 3 shifts: 03/19 0701 - 03/19 1900 In: 625 [I.V.:625] Out: 350 [Urine:350]RRIOLAST3 Intake/Output Summary (Last 24 hours) at 3/19/2019 1231 Last data filed at 3/19/2019 1200 Gross per 24 hour Intake 3000 ml Output 1050 ml Net 1950 ml EXAM 
 
exam  Other  
general Ill appearing/somnolent/ appears stated age HEENT:  Op moist no ulcers, JVD not elevated, no cervical LAD Chest No pectus deformity, normal chest rise b/l HEART:  RRR no m/r/g no rubs Lungs:  CTA b/l no r/r/w, diminished BS at bases ABD Soft/NT non rigid mildly distended, hypoactive BS   
EXT No c/c/e normal peripheral pulses Skin No rashes or ulcers, no mottling Neuro RASS is 0 Data I have personally reviewed data, flowsheets for the last 24 hours. Labs: 
Recent Labs 03/18/19 
6192 03/17/19 
2247 WBC 7.7 6.0 HGB 14.1 14.4 HCT 41.6 41.6  223 Recent Labs  
  03/18/19 
0641 03/17/19 
2247 03/17/19 
2202 * 133*  --   
K 3.7 3.6  --   
 99  --   
CO2 27 27  --   
* 84  --   
BUN 9 12  --   
CREA 0.74 0.81  --   
CA 9.1 8.7  --   
MG 2.0  --   --   
PHOS 3.2  --   --   
ALB 3.9  --   --   
SGOT 20  --   --   
ALT 19  --   --   
INR 1.0 1.0 1.0 ABG No results for input(s): PHI, PO2I, PCO2I in the last 72 hours. Anthony Vernon MD 
[facility]

## 2019-03-19 NOTE — PROGRESS NOTES
Hospitalist Progress Note Baljinder Diana MD 
Answering service: [phone] OR [phone] from in house phone Date of Service:  3/19/2019 NAME:  Jamarcus Chacon :  7/3/1928 MRN:  [id number] Admission Summary: This is a 80-year-old white female with past medical history of hypothyroidism, presented as admission/transfer from [facility] Emergency Department to [facility] ICU with initial reports of aphasia, right facial droop with new diagnosis of subarachnoid hemorrhage, intracranial hemorrhage. Interval history / Subjective:  
Pt is awake , Aphasiaresolved, alert and oriented SLP eval and PT/OT today d/w pt and RN Assessment & Plan: 1. Acute CVA with  Subarachnoid hemorrhage - in the right frontal lobe and right occipital lobe -  Place a neurovascular check, fall precautions.   
-  MRI- There are multiple foci of low signal foci on gradient echo images in the cerebral hemispheres and cerebellum. The low signal focus in the right frontal 
lobe, right parietal lobe and left occipital lobe are acute foci of hemorrhage 
based on CT and appears slightly larger on MR and findings most likely represent 
vasculopathy possibly related to amyloid angiopathy.  
  
No acute infarction identified on diffusion imaging. ... There are moderate 
changes small vessel disease periventricular white matter. 2.  Small left intraparenchymal hemorrhage stable seen by NSGY 
- involving the left occipital lobe. Plan is as noted above. - cont on Cardene and titrate her IV infusion and to keep her goal systolic blood pressure less than 140 mmHg. - PRN hydralazine 3. Status post fall -  
- no hip or rib fractured 4. Expressive aphasia. - resolevd 5. Hypothyroidism - check TSH level. 7.  Hyponatremia, mild. Repeat sodium levels.  
 
Code status: DNR  
DVT prophylaxis: SCD 
 
 Care Plan discussed with: Patient/Family and Nurse Disposition: TBD home tomorrow Hospital Problems  Date Reviewed: 3/18/2019 Codes Class Noted POA Aphasia ICD-10-CM: R47.01 
ICD-9-CM: 784.3  3/18/2019 Unknown  
   
 ICH (intracerebral hemorrhage) ([facility] [address] ICD-10-CM: I61.9 ICD-9-CM: 514  3/18/2019 Unknown CVA (cerebral vascular accident) [facility]) ICD-10-CM: I63.9 ICD-9-CM: 434.91  3/17/2019 Yes Review of Systems:  
Review of systems not obtained due to patient factors. Xr Hip Lt W Or Wo Pelv 2-3 Vws Result Date: 3/18/2019 IMPRESSION: Soft tissue swelling along the lateral aspect of the left hip. No evidence of acute fracture. Mri Brain Wo Cont Result Date: 3/18/2019 IMPRESSION: 1. There are multiple foci of low signal foci on gradient echo images in the cerebral hemispheres and cerebellum. The low signal focus in the right frontal lobe, right parietal lobe and left occipital lobe are acute foci of hemorrhage based on CT and appears slightly larger on MR and findings most likely represent vasculopathy possibly related to amyloid angiopathy. No acute infarction identified on diffusion imaging. ... There are moderate changes small vessel disease periventricular white matter. Ct Head Wo Cont Result Date: 3/19/2019 IMPRESSION: 1. Stable small foci of subarachnoid hemorrhage in the right frontal and occipital lobes. Stable small intraparenchymal hemorrhage in the left occipital lobe. No new hemorrhage. Ct Head Wo Cont Result Date: 3/18/2019 IMPRESSION: 1. New small foci of subarachnoid hemorrhage in the right frontal lobe and right occipital lobe. 2. New small intraparenchymal hemorrhage in the left occipital lobe. 3. Unchanged generalized parenchymal volume loss and severe chronic microvascular ischemic disease. The findings were called to Dr. Moriah Nair on 3/18/2019 at 1:22 AM by Dr. Vinnie Nelson 128 Xr Chest [facility] Result Date: 3/18/2019 IMPRESSION: 1. No acute process Vital Signs:  
 Last 24hrs VS reviewed since prior progress note. Most recent are: 
Visit Vitals /55 Pulse 73 Temp 97.5 °F (36.4 °C) Resp 20 Ht 5' 7\" (1.702 m) Wt 47.2 kg (104 lb) SpO2 98% BMI 16.29 kg/m² Intake/Output Summary (Last 24 hours) at 3/19/2019 1134 Last data filed at 3/19/2019 1000 Gross per 24 hour Intake 2875 ml Output 1050 ml Net 1825 ml Physical Examination:  
 
 
     
Constitutional:  No acute distress, ENT:  Oral mucous moist, Resp:  CTA bilaterally. No wheezing/rhonchi/rales. CV:  Regular rhythm, normal rate, GI:  Soft, non distended, non tender. bs+ Musculoskeletal:  No edema, warm, 2+ pulses throughout Neurologic:  Moves all extremities. AAOx3 Data Review:  
 I personally reviewed  Image and labs Xr Hip Lt W Or Wo Pelv 2-3 Vws Result Date: 3/18/2019 IMPRESSION: Soft tissue swelling along the lateral aspect of the left hip. No evidence of acute fracture. Mri Brain Wo Cont Result Date: 3/18/2019 IMPRESSION: 1. There are multiple foci of low signal foci on gradient echo images in the cerebral hemispheres and cerebellum. The low signal focus in the right frontal lobe, right parietal lobe and left occipital lobe are acute foci of hemorrhage based on CT and appears slightly larger on MR and findings most likely represent vasculopathy possibly related to amyloid angiopathy. No acute infarction identified on diffusion imaging. ... There are moderate changes small vessel disease periventricular white matter. Ct Head Wo Cont Result Date: 3/19/2019 IMPRESSION: 1. Stable small foci of subarachnoid hemorrhage in the right frontal and occipital lobes. Stable small intraparenchymal hemorrhage in the left occipital lobe. No new hemorrhage. Ct Head Wo Cont Result Date: 3/18/2019 IMPRESSION: 1. New small foci of subarachnoid hemorrhage in the right frontal lobe and right occipital lobe. 2. New small intraparenchymal hemorrhage in the left occipital lobe. 3. Unchanged generalized parenchymal volume loss and severe chronic microvascular ischemic disease. The findings were called to Dr. Marylene Minder on 3/18/2019 at 1:22 AM by Dr. Harshal Nelson 128 Xr Chest [facility] Result Date: 3/18/2019 IMPRESSION: 1. No acute process Labs:  
 
Recent Labs  
  03/18/19 
0641 03/17/19 
2247 WBC 7.7 6.0 HGB 14.1 14.4 HCT 41.6 41.6  223 Recent Labs  
  03/18/19 
0641 03/17/19 
2247 * 133* K 3.7 3.6  99 CO2 27 27 BUN 9 12 CREA 0.74 0.81 * 84  
CA 9.1 8.7 MG 2.0  --   
PHOS 3.2  --   
 
Recent Labs  
  03/18/19 
4995 SGOT 20 ALT 19 AP 65 TBILI 0.6 TP 7.5 ALB 3.9 GLOB 3.6 Recent Labs  
  03/18/19 
0641 03/17/19 
2247 03/17/19 
2202 INR 1.0 1.0 1.0 PTP 10.1 10.0  -- No results for input(s): FE, TIBC, PSAT, FERR in the last 72 hours. No results found for: FOL, RBCF No results for input(s): PH, PCO2, PO2 in the last 72 hours. Recent Labs  
  03/18/19 
5603 TROIQ <0.05 Lab Results Component Value Date/Time Cholesterol, total 189 03/17/2019 10:47 PM  
 HDL Cholesterol 70 03/17/2019 10:47 PM  
 LDL, calculated 60.4 03/17/2019 10:47 PM  
 Triglyceride 293 (H) 03/17/2019 10:47 PM  
 CHOL/HDL Ratio 2.7 03/17/2019 10:47 PM  
 
Lab Results Component Value Date/Time Glucose (POC) 106 (H) 03/17/2019 10:01 PM  
 
No results found for: COLOR, APPRN, SPGRU, REFSG, MARILYN, PROTU, GLUCU, KETU, BILU, UROU, JERO, LEUKU, GLUKE, EPSU, BACTU, WBCU, RBCU, CASTS, UCRY Medications Reviewed:  
 
Current Facility-Administered Medications Medication Dose Route Frequency  hydrALAZINE (APRESOLINE) 20 mg/mL injection 10 mg  10 mg IntraVENous Q6H PRN  
 ondansetron (ZOFRAN) injection 4 mg  4 mg IntraVENous Q6H PRN  
 naloxone (NARCAN) injection 0.4 mg  0.4 mg IntraVENous PRN  
  0.9% sodium chloride infusion  125 mL/hr IntraVENous CONTINUOUS  
 niCARdipine (CARDENE) 25 mg in 0.9% sodium chloride 250 mL infusion  0-15 mg/hr IntraVENous TITRATE  dorzolamide (TRUSOPT) 2 % ophthalmic solution 1 Drop  1 Drop Right Eye BID  levothyroxine (SYNTHROID) tablet 25 mcg  25 mcg Oral 6am  
 timolol (TIMOPTIC) 0.5 % ophthalmic solution 1 Drop  1 Drop Right Eye BID And  
 brimonidine (ALPHAGAN) 0.2 % ophthalmic solution 1 Drop  1 Drop Right Eye BID  famotidine (PEPCID) tablet 20 mg  20 mg Oral DAILY  
 
______________________________________________________________________ EXPECTED LENGTH OF STAY: 3d 2h 
ACTUAL LENGTH OF STAY:          1 Annita Morrison MD

## 2019-03-19 NOTE — PROGRESS NOTES
Problem: Pressure Injury - Risk of  Goal: *Prevention of pressure injury  Document Papito Scale and appropriate interventions in the flowsheet. Outcome: Progressing Towards Goal  Pressure Injury Interventions:  Sensory Interventions: Avoid rigorous massage over bony prominences, Pressure redistribution bed/mattress (bed type), Turn and reposition approx. every two hours (pillows and wedges if needed)    Moisture Interventions: Absorbent underpads, Minimize layers    Activity Interventions: Pressure redistribution bed/mattress(bed type), PT/OT evaluation    Mobility Interventions: Assess need for specialty bed, Pressure redistribution bed/mattress (bed type), Turn and reposition approx. every two hours(pillow and wedges), HOB 30 degrees or less    Nutrition Interventions: Document food/fluid/supplement intake                    Problem: Falls - Risk of  Goal: *Absence of Falls  Document Manuel Fall Risk and appropriate interventions in the flowsheet.   Outcome: Progressing Towards Goal  Fall Risk Interventions:  Mobility Interventions: OT consult for ADLs, Patient to call before getting OOB, Strengthening exercises (ROM-active/passive)    Mentation Interventions: Door open when patient unattended, Reorient patient, Room close to nurse's station, Increase mobility, More frequent rounding    Medication Interventions: Evaluate medications/consider consulting pharmacy, Teach patient to arise slowly    Elimination Interventions: Call light in reach, Toileting schedule/hourly rounds    History of Falls Interventions: Door open when patient unattended, Room close to nurse's station

## 2019-03-19 NOTE — PROGRESS NOTES
Repeat CT yesterday evening 11pm shows stable small foci of ICH without new hemorrhage. No need for further imaging. No need for neurosurgical follow up. Will sign off. Please reconsult if further neurosurgical issues arise.

## 2019-03-19 NOTE — PROGRESS NOTES
0730- Bedside and Verbal shift change report given to Carina Da Silva (oncoming nurse) by Mustapha Suresh RN (offgoing nurse). Report included the following information SBAR, Kardex, ED Summary, Procedure Summary, Intake/Output, MAR, Recent Results, Med Rec Status and Cardiac Rhythm NSR. Shift Summary: Patient alert, oriented x4, delayed responses has some word finding, FC, BHATT, PERRLA on L, opaque fixed on R. VSS. NIH done score was 1, CT done.

## 2019-03-20 PROBLEM — I63.9 CVA (CEREBRAL VASCULAR ACCIDENT) (HCC): Status: RESOLVED | Noted: 2019-03-17 | Resolved: 2019-03-20

## 2019-03-20 PROBLEM — I61.9 ICH (INTRACEREBRAL HEMORRHAGE) (HCC): Status: RESOLVED | Noted: 2019-03-18 | Resolved: 2019-03-20

## 2019-03-20 PROBLEM — R47.01 APHASIA: Status: RESOLVED | Noted: 2019-03-18 | Resolved: 2019-03-20

## 2019-03-20 PROCEDURE — 97116 GAIT TRAINING THERAPY: CPT

## 2019-03-20 PROCEDURE — 74011250637 HC RX REV CODE- 250/637: Performed by: FAMILY MEDICINE

## 2019-03-20 PROCEDURE — 97535 SELF CARE MNGMENT TRAINING: CPT

## 2019-03-20 PROCEDURE — 74011250637 HC RX REV CODE- 250/637: Performed by: HOSPITALIST

## 2019-03-20 PROCEDURE — 74011250637 HC RX REV CODE- 250/637: Performed by: INTERNAL MEDICINE

## 2019-03-20 PROCEDURE — 65660000000 HC RM CCU STEPDOWN

## 2019-03-20 RX ORDER — AMLODIPINE BESYLATE 5 MG/1
5 TABLET ORAL DAILY
Qty: 30 TAB | Refills: 0 | Status: SHIPPED | OUTPATIENT
Start: 2019-03-20 | End: 2019-04-19

## 2019-03-20 RX ORDER — AMLODIPINE BESYLATE 5 MG/1
5 TABLET ORAL DAILY
Status: DISCONTINUED | OUTPATIENT
Start: 2019-03-20 | End: 2019-03-21 | Stop reason: HOSPADM

## 2019-03-20 RX ADMIN — AMLODIPINE BESYLATE 5 MG: 5 TABLET ORAL at 11:45

## 2019-03-20 RX ADMIN — TIMOLOL MALEATE 1 DROP: 5 SOLUTION OPHTHALMIC at 08:15

## 2019-03-20 RX ADMIN — BRIMONIDINE TARTRATE 1 DROP: 2 SOLUTION OPHTHALMIC at 18:56

## 2019-03-20 RX ADMIN — FAMOTIDINE 20 MG: 20 TABLET ORAL at 08:13

## 2019-03-20 RX ADMIN — DORZOLAMIDE HYDROCHLORIDE 1 DROP: 20 SOLUTION/ DROPS OPHTHALMIC at 08:15

## 2019-03-20 RX ADMIN — TIMOLOL MALEATE 1 DROP: 5 SOLUTION OPHTHALMIC at 18:56

## 2019-03-20 RX ADMIN — BRIMONIDINE TARTRATE 1 DROP: 2 SOLUTION OPHTHALMIC at 08:15

## 2019-03-20 RX ADMIN — LEVOTHYROXINE SODIUM 25 MCG: 25 TABLET ORAL at 05:48

## 2019-03-20 RX ADMIN — DORZOLAMIDE HYDROCHLORIDE 1 DROP: 20 SOLUTION/ DROPS OPHTHALMIC at 18:56

## 2019-03-20 NOTE — PROGRESS NOTES
Hospitalist Progress Note Annita Morrison MD 
Answering service: [phone] OR [phone] from in house phone Date of Service:  3/20/2019 NAME:  Alex Harris :  7/3/1928 MRN:  [id number] Admission Summary: This is a 57-year-old white female with past medical history of hypothyroidism, presented as admission/transfer from [facility] to [facility] with initial reports of aphasia, right facial droop with new diagnosis of subarachnoid hemorrhage, intracranial hemorrhage. Interval history / Subjective:  
Pt is awake , Aphasiaresolved, alert and oriented SLP eval and PT/OTshe said could not stand up If I have to go to rehab I want [facility] Assessment & Plan: 1. Acute CVA with  Subarachnoid hemorrhage - in the right frontal lobe and right occipital lobe -  Place a neurovascular check, fall precautions.   
-  MRI- There are multiple foci of low signal foci on gradient echo images in the cerebral hemispheres and cerebellum. The low signal focus in the right frontal 
lobe, right parietal lobe and left occipital lobe are acute foci of hemorrhage 
based on CT and appears slightly larger on MR and findings most likely represent 
vasculopathy possibly related to amyloid angiopathy.  
  
No acute infarction identified on diffusion imaging. ... There are moderate 
changes small vessel disease periventricular white matter. 2.  Small left intraparenchymal hemorrhage stable seen by NSGY 
- involving the left occipital lobe. Plan is as noted above. - cont on Cardene and titrate her IV infusion and to keep her goal systolic blood pressure less than 140 mmHg. - PRN hydralazine - Start CCB 3.  Status post fall -  
- no hip or rib fractured [facility] if pt need rehab  Re- eval PT/OT 4. Expressive aphasia. - resolevd 5. Hypothyroidism - check TSH level. 7.  Hyponatremia, mild. Repeat sodium levels. Code status: DNR  
DVT prophylaxis: SCD Care Plan discussed with: Patient/Family and Nurse Disposition: TBD rehab vs Home Hospital Problems  Date Reviewed: 3/18/2019 None Review of Systems:  
Review of systems not obtained due to patient factors. Xr Hip Lt W Or Wo Pelv 2-3 Vws Result Date: 3/18/2019 IMPRESSION: Soft tissue swelling along the lateral aspect of the left hip. No evidence of acute fracture. Mri Brain Wo Cont Result Date: 3/18/2019 IMPRESSION: 1. There are multiple foci of low signal foci on gradient echo images in the cerebral hemispheres and cerebellum. The low signal focus in the right frontal lobe, right parietal lobe and left occipital lobe are acute foci of hemorrhage based on CT and appears slightly larger on MR and findings most likely represent vasculopathy possibly related to amyloid angiopathy. No acute infarction identified on diffusion imaging. ... There are moderate changes small vessel disease periventricular white matter. Ct Head Wo Cont Result Date: 3/19/2019 IMPRESSION: 1. Stable small foci of subarachnoid hemorrhage in the right frontal and occipital lobes. Stable small intraparenchymal hemorrhage in the left occipital lobe. No new hemorrhage. Ct Head Wo Cont Result Date: 3/18/2019 IMPRESSION: 1. New small foci of subarachnoid hemorrhage in the right frontal lobe and right occipital lobe. 2. New small intraparenchymal hemorrhage in the left occipital lobe. 3. Unchanged generalized parenchymal volume loss and severe chronic microvascular ischemic disease. The findings were called to Dr. Kristel Nair on 3/18/2019 at 1:22 AM by Dr. Maurizio Lombardo. [address] Xr Chest [facility] Result Date: 3/18/2019 IMPRESSION: 1. No acute process Vital Signs:  
 Last 24hrs VS reviewed since prior progress note. Most recent are: 
Visit Vitals /58 (BP 1 Location: Right arm, BP Patient Position: At rest) Pulse 63 Temp 98.2 °F (36.8 °C) Resp 15 Ht 5' 7\" (1.702 m) Wt 53.3 kg (117 lb 8 oz) SpO2 97% BMI 18.40 kg/m² Intake/Output Summary (Last 24 hours) at 3/20/2019 5771 Last data filed at 3/20/2019 2467 Gross per 24 hour Intake 1625 ml Output 1750 ml Net -125 ml Physical Examination:  
 
 
     
Constitutional:  No acute distress, ENT:  Oral mucous moist, Resp:  CTA bilaterally. No wheezing/rhonchi/rales. CV:  Regular rhythm, normal rate, GI:  Soft, non distended, non tender. bs+ Musculoskeletal:  No edema, warm, 2+ pulses throughout Neurologic:  Moves all extremities. AAOx3 Data Review:  
 I personally reviewed  Image and labs Xr Hip Lt W Or Wo Pelv 2-3 Vws Result Date: 3/18/2019 IMPRESSION: Soft tissue swelling along the lateral aspect of the left hip. No evidence of acute fracture. Mri Brain Wo Cont Result Date: 3/18/2019 IMPRESSION: 1. There are multiple foci of low signal foci on gradient echo images in the cerebral hemispheres and cerebellum. The low signal focus in the right frontal lobe, right parietal lobe and left occipital lobe are acute foci of hemorrhage based on CT and appears slightly larger on MR and findings most likely represent vasculopathy possibly related to amyloid angiopathy. No acute infarction identified on diffusion imaging. ... There are moderate changes small vessel disease periventricular white matter. Ct Head Wo Cont Result Date: 3/19/2019 IMPRESSION: 1. Stable small foci of subarachnoid hemorrhage in the right frontal and occipital lobes. Stable small intraparenchymal hemorrhage in the left occipital lobe. No new hemorrhage. Ct Head Wo Cont Result Date: 3/18/2019 IMPRESSION: 1. New small foci of subarachnoid hemorrhage in the right frontal lobe and right occipital lobe. 2. New small intraparenchymal hemorrhage in the left occipital lobe.  3. Unchanged generalized parenchymal volume loss and severe chronic microvascular ischemic disease. The findings were called to Dr. Giorgi Nguyen on 3/18/2019 at 1:22 AM by Dr. Sherine Hooper. [address] Xr Chest [facility]  Result Date: 3/18/2019 IMPRESSION: 1. No acute process Labs:  
 
Recent Labs  
  03/18/19 
0641 03/17/19 
2247 WBC 7.7 6.0 HGB 14.1 14.4 HCT 41.6 41.6  223 Recent Labs  
  03/18/19 
0641 03/17/19 
2247 * 133* K 3.7 3.6  99 CO2 27 27 BUN 9 12 CREA 0.74 0.81 * 84  
CA 9.1 8.7 MG 2.0  --   
PHOS 3.2  --   
 
Recent Labs  
  03/18/19 
3761 SGOT 20 ALT 19 AP 65 TBILI 0.6 TP 7.5 ALB 3.9 GLOB 3.6 Recent Labs  
  03/18/19 
0641 03/17/19 
2247 03/17/19 
2202 INR 1.0 1.0 1.0 PTP 10.1 10.0  -- No results for input(s): FE, TIBC, PSAT, FERR in the last 72 hours. No results found for: FOL, RBCF No results for input(s): PH, PCO2, PO2 in the last 72 hours. Recent Labs  
  03/18/19 
6205 TROIQ <0.05 Lab Results Component Value Date/Time Cholesterol, total 189 03/17/2019 10:47 PM  
 HDL Cholesterol 70 03/17/2019 10:47 PM  
 LDL, calculated 60.4 03/17/2019 10:47 PM  
 Triglyceride 293 (H) 03/17/2019 10:47 PM  
 CHOL/HDL Ratio 2.7 03/17/2019 10:47 PM  
 
Lab Results Component Value Date/Time Glucose (POC) 106 (H) 03/17/2019 10:01 PM  
 
No results found for: COLOR, APPRN, SPGRU, REFSG, MARILYN, PROTU, GLUCU, KETU, BILU, UROU, JERO, LEUKU, GLUKE, EPSU, BACTU, WBCU, RBCU, CASTS, UCRY Medications Reviewed:  
 
Current Facility-Administered Medications Medication Dose Route Frequency  hydrALAZINE (APRESOLINE) 20 mg/mL injection 10 mg  10 mg IntraVENous Q6H PRN  polyvinyl alcohol (LIQUIFILM TEARS) 1.4 % ophthalmic solution 1 Drop  1 Drop Both Eyes PRN  
 ondansetron (ZOFRAN) injection 4 mg  4 mg IntraVENous Q6H PRN  
 naloxone (NARCAN) injection 0.4 mg  0.4 mg IntraVENous PRN  
 0.9% sodium chloride infusion  125 mL/hr IntraVENous CONTINUOUS  
  niCARdipine (CARDENE) 25 mg in 0.9% sodium chloride 250 mL infusion  0-15 mg/hr IntraVENous TITRATE  dorzolamide (TRUSOPT) 2 % ophthalmic solution 1 Drop  1 Drop Right Eye BID  levothyroxine (SYNTHROID) tablet 25 mcg  25 mcg Oral 6am  
 timolol (TIMOPTIC) 0.5 % ophthalmic solution 1 Drop  1 Drop Right Eye BID And  
 brimonidine (ALPHAGAN) 0.2 % ophthalmic solution 1 Drop  1 Drop Right Eye BID  famotidine (PEPCID) tablet 20 mg  20 mg Oral DAILY  
 
______________________________________________________________________ EXPECTED LENGTH OF STAY: 3d 2h 
ACTUAL LENGTH OF STAY:          2 Baljinder Diana MD

## 2019-03-20 NOTE — PROGRESS NOTES
1530: Bedside and Verbal shift change report given to Radha MARTINEZ (oncoming nurse) by Clary Soulier RN (offgoing nurse). Report included the following information SBAR, Kardex, Intake/Output, MAR, Recent Results and Cardiac Rhythm NSR.  
 
1930: Bedside and Verbal shift change report given to [address] [place] [address] (oncoming nurse) by Yessi Burr RN (offgoing nurse). Report included the following information SBAR, Kardex, Intake/Output, MAR, Recent Results and Cardiac Rhythm NSR.

## 2019-03-20 NOTE — PROGRESS NOTES
1930: Bedside, Verbal and Written shift change report given to Amelie Keller RN (oncoming nurse) by Mili Hare RN (offgoing nurse). Report included the following information SBAR, Kardex, ED Summary, OR Summary, Procedure Summary, Intake/Output, MAR, Accordion, Recent Results, Med Rec Status, Cardiac Rhythm NSR and Alarm Parameters . 2015: TRANSFER - OUT REPORT: 
 
Verbal report given to Ty RN(name) on Aminta Brioenseling  being transferred to NSTU(unit) for routine progression of care Report consisted of patients Situation, Background, Assessment and  
Recommendations(SBAR). Information from the following report(s) SBAR, Kardex, ED Summary, OR Summary, Procedure Summary, Intake/Output, MAR, Accordion, Recent Results, Med Rec Status and Cardiac Rhythm NSR was reviewed with the receiving nurse. Lines:  
Peripheral IV 03/18/19 Left; Anterior Forearm (Active) Site Assessment Clean, dry, & intact 3/19/2019  8:00 PM  
Phlebitis Assessment 0 3/19/2019  8:00 PM  
Infiltration Assessment 0 3/19/2019  8:00 PM  
Dressing Status Clean, dry, & intact 3/19/2019  8:00 PM  
Dressing Type Tape;Transparent 3/19/2019  8:00 PM  
Hub Color/Line Status Blue; Infusing 3/19/2019  8:00 PM  
Action Taken Open ports on tubing capped 3/19/2019  8:00 PM  
Alcohol Cap Used Yes 3/19/2019  8:00 PM  
  
 
Opportunity for questions and clarification was provided. Patient transported with: 
 Monitor Patient's medications from home Patient-specific medications from Pharmacy Registered Nurse Tech

## 2019-03-20 NOTE — PROGRESS NOTES
Speech pathology note Note per chart review that aphasia has resolved, however upon discussion with patient, she reported she is only at 90% of her baseline. Given significant improvement to date, hopeful for spontaneous recovery of language function. If deficits persist, recommend SLP follow up at next level of care which appears to be IP Rehab at this time. Thank you. Chen Walsh., CCC-SLP

## 2019-03-20 NOTE — PROGRESS NOTES
Problem: Pressure Injury - Risk of 
Goal: *Prevention of pressure injury Description Document Papito Scale and appropriate interventions in the flowsheet. Outcome: Progressing Towards Goal 
  
Problem: Falls - Risk of 
Goal: *Absence of Falls Description Document [place] Fall Risk and appropriate interventions in the flowsheet. Outcome: Progressing Towards Goal 
  
Problem: Patient Education:  Go to Education Activity Goal: Patient/Family Education Outcome: Progressing Towards Goal 
  
Problem: Subarachnoid Hemorrhage Stroke:Admission Day 3 Goal: Activity/Safety Outcome: Progressing Towards Goal 
Goal: *Hemodynamically stable Outcome: Progressing Towards Goal 
Goal: *Absence of signs and symptoms of DVT Outcome: Progressing Towards Goal

## 2019-03-20 NOTE — PROGRESS NOTES
Problem: Self Care Deficits Care Plan (Adult) Goal: *Acute Goals and Plan of Care (Insert Text) Description Occupational Therapy Goals Initiated 3/19/2019 1. Patient will perform grooming standing at sink for 5 minutes with supervision/set-up within 7 day(s). 2.  Patient will perform upper body ADLs with independence within 7 day(s). 3.  Patient will perform lower body ADLs with supervision/set-up within 7 day(s). 4.  Patient will perform toilet transfers with supervision/set-up within 7 day(s). 5.  Patient will perform all aspects of toileting with supervision/set-up within 7 day(s). 6.  Patient will participate in upper extremity therapeutic exercise/activities with independence for 5 minutes within 7 day(s). 7.  Patient will utilize energy conservation techniques during functional activities with verbal cues within 7 day(s). Outcome: Progressing Towards Goal 
  
OCCUPATIONAL THERAPY TREATMENT Patient: Loraine Shukla (31 y.o. female) Date: 3/20/2019 Diagnosis: ICH (intracerebral hemorrhage) ([facility]) [I61.9] SAH (subarachnoid hemorrhage) ([facility] [address] [I60.9] Aphasia [R47.01] <principal problem not specified> Precautions: Fall, DNR Chart, occupational therapy assessment, plan of care, and goals were reviewed. ASSESSMENT: 
Patient cleared by RN to be seen, received in chair and agreeable to treatment. Patient VSS throughout session, BP 120s/60s for entire session. Patient with CGA for functional transfers with VCs for safety and hand/foot placement. Patient with min A for toileting for clothing management, SBA for hygiene. Patient with CGA to stand at sink and wash hands. Patient returned to sitting in chair with call bell in reach, RN aware. Patient continues to demonstrate posterior lean in standing and functioning below baseline (IND and living alone). HIGHLY recommend IP rehab to maximize functional independence and safety prior to return home. Recommend with nursing patient to complete as able in order to maintain strength, endurance and independence: ADLs with supervision/setup, OOB to chair 3x/day and mobilizing to the bathroom for toileting with 1 assist (with RW and gait belt). Thank you for your assistance. Progression toward goals: 
?       Improving appropriately and progressing toward goals ? Improving slowly and progressing toward goals ? Not making progress toward goals and plan of care will be adjusted PLAN: 
Patient continues to benefit from skilled intervention to address the above impairments. Continue treatment per established plan of care. Discharge Recommendations:  Inpatient Rehab Further Equipment Recommendations for Discharge:  TBD by rehab SUBJECTIVE:  
Patient stated ? I feel so much better actually going to the bathroom, that bed pan is awful!? OBJECTIVE DATA SUMMARY:  
Cognitive/Behavioral Status: 
Neurologic State: Alert Orientation Level: Oriented X4 Cognition: Appropriate for age attention/concentration; Follows commands Perception: Cues to maintain midline in standing;Verbal;Tactile Perseveration: No perseveration noted Safety/Judgement: Awareness of environment; Fall prevention Functional Mobility and Transfers for ADLs: 
Bed Mobility: 
Supine to Sit: Minimum assistance; Additional time Transfers: 
Sit to Stand: Contact guard assistance; Additional time;Assist x1(VCs for posture and to correct posterior lean) Functional Transfers Toilet Transfer : Contact guard assistance; Additional time;Assist x1;Adaptive equipment(RW) 
Cues: Verbal cues provided Adaptive Equipment: Grab bars; [address] Cristin Bonilla (comment) Balance: 
Sitting: Impaired Sitting - Static: Good (unsupported) Sitting - Dynamic: Good (unsupported) Standing: Impaired; With support Standing - Static: Fair;Constant support Standing - Dynamic : Fair;Constant support ADL Intervention: 
Grooming Washing Hands: Contact guard assistance(standing at sink ) Toileting Toileting Assistance: Minimum assistance Bladder Hygiene: Stand-by assistance Bowel Hygiene: Stand-by assistance Clothing Management: Moderate assistance Cues: Physical assistance for pants up;Physical assistance for pants down;Verbal cues provided Adaptive Equipment: Grab bars; Madeline Stack Cognitive Retraining Safety/Judgement: Awareness of environment; Fall prevention Pain: 
Pain Scale 1: Numeric (0 - 10) Pain Intensity 1: 0 Activity Tolerance:  
Good, VSS Please refer to the flowsheet for vital signs taken during this treatment. After treatment:  
? Patient left in no apparent distress sitting up in chair ? Patient left in no apparent distress in bed 
? Call bell left within reach ? Nursing notified ? Caregiver present ? Chair alarm activated COMMUNICATION/COLLABORATION:  
The patient?s plan of care was discussed with: Physical Therapist and Registered Nurse Yolanda Reis OT Time Calculation: 31 mins

## 2019-03-20 NOTE — PROGRESS NOTES
Bedside and Verbal shift change report given to MICHELLE Orellana  (oncoming nurse) by Abel Benítez RN (offgoing nurse). Report included the following information SBAR, Kardex, Intake/Output, MAR, Recent Results and Cardiac Rhythm NSR.

## 2019-03-20 NOTE — PROGRESS NOTES
TRANSFER - IN REPORT: 
 
Verbal report received from [address] [place] [address] (name) on Vonnie Miranda  being received from ICU (unit) for routine progression of care Report consisted of patients Situation, Background, Assessment and  
Recommendations(SBAR). Information from the following report(s) SBAR, MAR, Recent Results and Cardiac Rhythm SR was reviewed with the receiving nurse. Opportunity for questions and clarification was provided. Assessment completed upon patients arrival to unit and care assumed.

## 2019-03-20 NOTE — PROGRESS NOTES
Bedside shift change report given to Veronique Vilchis (oncoming nurse) by Tamara Tafoya RN (offgoing nurse).  Report included the following information SBAR, MAR, Recent Results and Cardiac Rhythm SR.

## 2019-03-20 NOTE — PROGRESS NOTES
Problem: Mobility Impaired (Adult and Pediatric) Goal: *Acute Goals and Plan of Care (Insert Text) Description Physical Therapy Goals Initiated 3/19/2019 1. Patient will move from supine to sit and sit to supine  and scoot up and down in bed with supervision/set-up within 7 day(s). 2.  Patient will transfer from bed to chair and chair to bed with minimal assistance/contact guard assist using the least restrictive device within 7 day(s). 3.  Patient will perform sit to stand with minimal assistance/contact guard assist within 7 day(s). 4.  Patient will ambulate with minimal assistance/contact guard assist for 150 feet with the least restrictive device within 7 day(s). 5.  Patient will improve Holland Balance score by 7 points within 7 days. Outcome: Progressing Towards Goal 
 
PHYSICAL THERAPY TREATMENT Patient: Rufus Slaughter (14 y.o. female) Date: 3/20/2019 Diagnosis: ICH (intracerebral hemorrhage) ([facility]) [I61.9] SAH (subarachnoid hemorrhage) ([facility] [address] [I60.9] Aphasia [R47.01] <principal problem not specified> Precautions: Fall, DNR Chart, physical therapy assessment, plan of care and goals were reviewed. ASSESSMENT: 
Cleared for mobility by RN. Received pt supine in bed, very motivated to participate in session. She remains limited by decreased attention/concentration to task; impulsivity, impaired sense of midline, impaired balance, and impaired gait leading to high falls risk. Overall, she required min A and increased time to mobilize to EOB. Completed sit<>stands with min A and cues + manual facilitation to correct from strong posterior lean on initial stance. Gait training progressed in hallway with min A x2 (B HHA) - demos very narrowed CAROLYN with near scissor stepping, weaving path drifts, and + LOBs with head movements, turns, and distractions. Remained up in chair at end of session.  Continue to recommend discharge to short stay acute IP rehab as she is far below her completely independent baseline. Progression toward goals: 
?       Improving appropriately and progressing toward goals ? Improving slowly and progressing toward goals ? Not making progress toward goals and plan of care will be adjusted PLAN: 
Patient continues to benefit from skilled intervention to address the above impairments. Continue treatment per established plan of care. Discharge Recommendations:  Inpatient Rehab Further Equipment Recommendations for Discharge:  TBD SUBJECTIVE:  
Patient stated ? I've been so anxious to try and walk again. ? OBJECTIVE DATA SUMMARY:  
Critical Behavior: 
Neurologic State: Alert, Eyes open spontaneously Orientation Level: Oriented X4 Cognition: Follows commands, Appropriate decision making, Appropriate for age attention/concentration, Appropriate safety awareness Safety/Judgement: Awareness of environment, Insight into deficits Functional Mobility Training: 
Bed Mobility: 
Supine to Sit: Minimum assistance; Additional time Transfers: 
Sit to Stand: Minimum assistance Stand to Sit: Minimum assistance Balance: 
Sitting: Impaired Sitting - Static: Good (unsupported) Sitting - Dynamic: Good (unsupported) Standing: Impaired Standing - Static: Fair;Constant support Standing - Dynamic : Fair;Constant support Ambulation/Gait Training: 
Assistive Device: Gait belt(HHA) Ambulation - Level of Assistance: Assist x2;Minimal assistance Gait Abnormalities: Ataxic; Altered arm swing;Path deviations;Trunk sway increased Base of Support: Narrowed; Center of gravity altered Speed/Latia: Fluctuations Step Length: Left shortened;Right shortened Pain: 
Pain Scale 1: Numeric (0 - 10) Pain Intensity 1: 0 Activity Tolerance: VSS Please refer to the flowsheet for vital signs taken during this treatment. After treatment:  
? Patient left in no apparent distress sitting up in chair ? Patient left in no apparent distress in bed ? Call bell left within reach ? Nursing notified ? Caregiver present 
? chair  alarm activated COMMUNICATION/EDUCATION:  
The patient?s plan of care was discussed with: Occupational Therapist and Registered Nurse. Patient was educated regarding Her deficit(s) of impaired balance as this relates to Her diagnosis of CVA. She demonstrated Good understanding as evidenced by nodding. Patient and/or family was verbally educated on the BE FAST acronym for signs/symptoms of CVA and TIA. BE FAST was written on patient's communication board  for visual education and reinforcement. All questions answered with patient indicating good understanding. ?  Fall prevention education was provided and the patient/caregiver indicated understanding. ? Patient/family have participated as able in goal setting and plan of care. ?  Patient/family agree to work toward stated goals and plan of care. ?  Patient understands intent and goals of therapy, but is neutral about his/her participation. ? Patient is unable to participate in goal setting and plan of care. Thank you for this referral. 
Ozzy Leyva, PT, DPT Time Calculation: 19 mins

## 2019-03-20 NOTE — PROGRESS NOTES
CM noted that therapy is recommending Inpatient Rehab for this pt. CM met with pt to discuss and to offer choice. She would like a referral to go to [address] [place] [address]. CM sent a referral to [address] [place] [address] via [employer]. Melissa Fernando, SYLWIAW,ACM-SW

## 2019-03-20 NOTE — DISCHARGE INSTRUCTIONS
Discharge Instructions       PATIENT ID: Aminta Alicea  MRN: [id number]   YOB: 1928    DATE OF ADMISSION: 3/18/2019  4:40 AM    DATE OF DISCHARGE: 3/20/2019    PRIMARY CARE PROVIDER: Jennifer Murillo MD     ATTENDING PHYSICIAN: Kashmir Booker MD  DISCHARGING PROVIDER: Miladis Claire MD    To contact this individual call [phone] and ask the  to page. If unavailable ask to be transferred the Adult Hospitalist Department. DISCHARGE DIAGNOSES ICH/ SAH    CONSULTATIONS: IP CONSULT TO INTENSIVIST  IP CONSULT TO NEUROSURGERY    PROCEDURES/SURGERIES: * No surgery found *    PENDING TEST RESULTS:   At the time of discharge the following test results are still pending:     FOLLOW UP APPOINTMENTS:   Follow-up Information     Follow up With Specialties Details Why Contact Casi Diane MD Internal Medicine In 1 week  [facility]  [address] [address] [place] [address]      Maritza Juarez MD Neurology In 4 weeks  [address] [facility]  [phone]             ADDITIONAL CARE RECOMMENDATIONS:     DIET: Regular Diet and Cardiac Diet    ACTIVITY: Activity as tolerated    WOUND CARE:     EQUIPMENT needed:       DISCHARGE MEDICATIONS:   See Medication Reconciliation Form    · It is important that you take the medication exactly as they are prescribed. · Keep your medication in the bottles provided by the pharmacist and keep a list of the medication names, dosages, and times to be taken in your wallet. · Do not take other medications without consulting your doctor. NOTIFY YOUR PHYSICIAN FOR ANY OF THE FOLLOWING:   Fever over 101 degrees for 24 hours. Chest pain, shortness of breath, fever, chills, nausea, vomiting, diarrhea, change in mentation, falling, weakness, bleeding. Severe pain or pain not relieved by medications. Or, any other signs or symptoms that you may have questions about.       DISPOSITION:  x  Home With:   OT  PT x HH  RN       SNF/Inpatient Rehab/LTAC    Independent/assisted living    Hospice    Other:     CDMP Checked:   Yes x     PROBLEM LIST Updated:  Yes x       Signed:   Angela Mckeon MD  3/20/2019  8:04 AM

## 2019-03-20 NOTE — PROGRESS NOTES
Problem: Subarachnoid Hemorrhage Stroke:Admission Day 2 Goal: Medications Outcome: Progressing Towards Goal 
Pt's BP monitored closely. Prn BP medications given as needed. Will continue to monitor

## 2019-03-21 ENCOUNTER — HOSPITAL ENCOUNTER (OUTPATIENT)
Dept: REHABILITATION | Age: 84
End: 2019-03-29
Attending: PHYSICAL MEDICINE & REHABILITATION | Admitting: PHYSICAL MEDICINE & REHABILITATION

## 2019-03-21 VITALS
TEMPERATURE: 98.1 F | BODY MASS INDEX: 16.82 KG/M2 | DIASTOLIC BLOOD PRESSURE: 47 MMHG | RESPIRATION RATE: 22 BRPM | WEIGHT: 107.14 LBS | HEART RATE: 77 BPM | HEIGHT: 67 IN | SYSTOLIC BLOOD PRESSURE: 103 MMHG | OXYGEN SATURATION: 98 %

## 2019-03-21 PROCEDURE — 74011250637 HC RX REV CODE- 250/637: Performed by: INTERNAL MEDICINE

## 2019-03-21 PROCEDURE — 97116 GAIT TRAINING THERAPY: CPT

## 2019-03-21 PROCEDURE — 74011250637 HC RX REV CODE- 250/637: Performed by: FAMILY MEDICINE

## 2019-03-21 RX ORDER — ACETAMINOPHEN 325 MG/1
650 TABLET ORAL
Status: DISCONTINUED | OUTPATIENT
Start: 2019-03-21 | End: 2019-03-21 | Stop reason: HOSPADM

## 2019-03-21 RX ADMIN — ACETAMINOPHEN 650 MG: 325 TABLET ORAL at 17:53

## 2019-03-21 RX ADMIN — LEVOTHYROXINE SODIUM 25 MCG: 25 TABLET ORAL at 05:32

## 2019-03-21 RX ADMIN — TIMOLOL MALEATE 1 DROP: 5 SOLUTION OPHTHALMIC at 09:40

## 2019-03-21 RX ADMIN — FAMOTIDINE 20 MG: 20 TABLET ORAL at 08:23

## 2019-03-21 RX ADMIN — BRIMONIDINE TARTRATE 1 DROP: 2 SOLUTION OPHTHALMIC at 09:40

## 2019-03-21 RX ADMIN — DORZOLAMIDE HYDROCHLORIDE 1 DROP: 20 SOLUTION/ DROPS OPHTHALMIC at 09:40

## 2019-03-21 RX ADMIN — ACETAMINOPHEN 650 MG: 325 TABLET ORAL at 04:45

## 2019-03-21 RX ADMIN — ACETAMINOPHEN 650 MG: 325 TABLET ORAL at 09:37

## 2019-03-21 NOTE — PROGRESS NOTES
Problem: Falls - Risk of 
Goal: *Absence of Falls Description Document Belinda Escamilla Fall Risk and appropriate interventions in the flowsheet.  
Outcome: Progressing Towards Goal

## 2019-03-21 NOTE — PROGRESS NOTES
TRANSFER - OUT REPORT: 
 
Verbal report given to Sirisha Rossi RN on Loraine Shukla  being transferred to [facility] for routine progression of care Report consisted of patients Situation, Background, Assessment and  
Recommendations(SBAR). Information from the following report(s) SBAR, ED Summary, Intake/Output, MAR and Cardiac Rhythm SB/SR was reviewed with the receiving nurse. Lines:  
Peripheral IV 03/18/19 Left; Anterior Forearm (Active) Site Assessment Clean, dry, & intact 3/20/2019  4:00 PM  
Phlebitis Assessment 0 3/20/2019  4:00 PM  
Infiltration Assessment 0 3/20/2019  4:00 PM  
Dressing Status Clean, dry, & intact 3/20/2019  4:00 PM  
Dressing Type Transparent;Tape 3/20/2019  4:00 PM  
Hub Color/Line Status Blue;End cap changed; Patent 3/20/2019  4:00 PM  
Action Taken Open ports on tubing capped 3/20/2019  4:00 PM  
Alcohol Cap Used Yes 3/20/2019 12:00 PM  
  
 
Opportunity for questions and clarification was provided. Patient transported with: 
 O2 @ 3 liters Patient-specific medications from Pharmacy Registered Nurse

## 2019-03-21 NOTE — PROGRESS NOTES
Problem: Pressure Injury - Risk of 
Goal: *Prevention of pressure injury Description Document Papito Scale and appropriate interventions in the flowsheet. Outcome: Progressing Towards Goal 
  
Problem: Patient Education: Go to Patient Education Activity Goal: Patient/Family Education Outcome: Progressing Towards Goal 
  
Problem: Falls - Risk of 
Goal: *Absence of Falls Description Document Sinai Brice Fall Risk and appropriate interventions in the flowsheet. Outcome: Progressing Towards Goal 
  
Problem: Patient Education: Go to Patient Education Activity Goal: Patient/Family Education Outcome: Progressing Towards Goal 
  
Problem: Patient Education:  Go to Education Activity Goal: Patient/Family Education Outcome: Progressing Towards Goal 
  
Problem: Subarachnoid Hemorrhage Stroke:Admission Day 3 Goal: Activity/Safety Outcome: Progressing Towards Goal 
Goal: Diagnostic Test/Procedures Outcome: Progressing Towards Goal 
Goal: Nutrition/Diet Outcome: Progressing Towards Goal 
Goal: Medications Outcome: Progressing Towards Goal 
Goal: Patient maintains clear airway/absence of aspiration Outcome: Progressing Towards Goal 
Goal: Treatments/Interventions/Procedures Outcome: Progressing Towards Goal 
Goal: Psychosocial 
Outcome: Progressing Towards Goal 
Goal: *Hemodynamically stable Outcome: Progressing Towards Goal 
Goal: *Absence of signs and symptoms of DVT Outcome: Progressing Towards Goal 
  
Problem: Patient Education: Go to Patient Education Activity Goal: Patient/Family Education Outcome: Progressing Towards Goal 
  
Problem: Hemorrhagic Stroke: Day 2 Goal: Off Pathway (Use only if patient is Off Pathway) Outcome: Progressing Towards Goal 
Goal: Activity/Safety Outcome: Progressing Towards Goal 
Goal: Consults, if ordered Outcome: Progressing Towards Goal 
Goal: Diagnostic Test/Procedures Outcome: Progressing Towards Goal 
Goal: Nutrition/Diet Outcome: Progressing Towards Goal 
 Goal: Medications Outcome: Progressing Towards Goal 
Goal: Respiratory Outcome: Progressing Towards Goal 
Goal: Treatments/Interventions/Procedures Outcome: Progressing Towards Goal 
Goal: Psychosocial 
Outcome: Progressing Towards Goal 
Goal: *Hemodynamically stable Outcome: Progressing Towards Goal 
Goal: *Verbalizes anxiety and depression are reduced or absent Outcome: Progressing Towards Goal 
Goal: *Absence of aspiration Outcome: Progressing Towards Goal 
Goal: *Absence of signs and symptoms of DVT Outcome: Progressing Towards Goal 
Goal: *Optimal pain control at patient's stated goal 
Outcome: Progressing Towards Goal 
Goal: *Tolerating diet Outcome: Progressing Towards Goal 
Goal: *Progressive mobility and function Outcome: Progressing Towards Goal 
Goal: *Rehabilitation readiness Outcome: Progressing Towards Goal 
  
Problem: Hemorrhagic Stroke: Day 3 Goal: Off Pathway (Use only if patient is Off Pathway) Outcome: Progressing Towards Goal 
Goal: Activity/Safety Outcome: Progressing Towards Goal 
Goal: Consults, if ordered Outcome: Progressing Towards Goal 
Goal: Diagnostic Test/Procedures Outcome: Progressing Towards Goal 
Goal: Nutrition/Diet Outcome: Progressing Towards Goal 
Goal: Medications Outcome: Progressing Towards Goal 
Goal: Respiratory Outcome: Progressing Towards Goal 
Goal: Treatments/Interventions/Procedures Outcome: Progressing Towards Goal 
Goal: Psychosocial 
Outcome: Progressing Towards Goal 
Goal: *Hemodynamically stable Outcome: Progressing Towards Goal 
Goal: *Verbalizes anxiety and depression are reduced or absent Outcome: Progressing Towards Goal 
Goal: *Absence of aspiration Outcome: Progressing Towards Goal 
Goal: *Absence of signs and symptoms of DVT Outcome: Progressing Towards Goal 
Goal: *Optimal pain control at patient's stated goal 
Outcome: Progressing Towards Goal 
Goal: *Tolerating diet Outcome: Progressing Towards Goal 
 Goal: *Progressive mobility and function Outcome: Progressing Towards Goal 
Goal: *Rehabilitation readiness Outcome: Progressing Towards Goal 
  
Problem: Hemorrhagic Stroke: Discharge Outcomes Goal: *Verbalizes anxiety and depression are reduced or absent Outcome: Progressing Towards Goal 
Goal: *Verbalize understanding of risk factor modification(Stroke Metric) Outcome: Progressing Towards Goal 
Goal: *Optimal pain control at patient's stated goal 
Outcome: Progressing Towards Goal 
Goal: *Hemodynamically stable Outcome: Progressing Towards Goal 
Goal: *Absence of aspiration pneumonia Outcome: Progressing Towards Goal 
Goal: *Aware of needed dietary changes Outcome: Progressing Towards Goal 
Goal: *Verbalizes understanding and describes medication purposes and frequencies Outcome: Progressing Towards Goal 
Goal: *Tolerating diet Outcome: Progressing Towards Goal 
Goal: *Absence of signs and symptoms of DVT Outcome: Progressing Towards Goal 
Goal: *Absence of aspiration Outcome: Progressing Towards Goal 
Goal: *Progressive mobility and function Outcome: Progressing Towards Goal 
Goal: *Home safety concerns addressed Outcome: Progressing Towards Goal 
  
Problem: Patient Education: Go to Patient Education Activity Goal: Patient/Family Education Outcome: Progressing Towards Goal 
  
Problem: Patient Education: Go to Patient Education Activity Goal: Patient/Family Education Outcome: Progressing Towards Goal 
  
Problem: Nutrition Deficit Goal: *Optimize nutritional status Outcome: Progressing Towards Goal 
  
Problem: Patient Education: Go to Patient Education Activity Goal: Patient/Family Education Outcome: Progressing Towards Goal

## 2019-03-21 NOTE — DISCHARGE SUMMARY
Discharge Summary       PATIENT ID: Melisa Conte  MRN: [id number]   YOB: 1928    DATE OF ADMISSION: 3/18/2019  4:40 AM    DATE OF DISCHARGE: 3/21/19   PRIMARY CARE PROVIDER: Osmel Santos MD     ATTENDING PHYSICIAN: Sherry Pulse  DISCHARGING PROVIDER: Kristofer Foreman MD    To contact this individual call [phone] and ask the  to page. If unavailable ask to be transferred the Adult Hospitalist Department. CONSULTATIONS: IP CONSULT TO INTENSIVIST  IP CONSULT TO NEUROSURGERY    PROCEDURES/SURGERIES: * No surgery found *    ADMITTING [address] [facility] COURSE:   This is a 80-year-old white female with past medical history of hypothyroidism, presented as admission/transfer from [facility] Emergency Department to [facility] ICU with initial reports of aphasia, right facial droop with new diagnosis of subarachnoid hemorrhage, intracranial hemorrhage.       Assessment & Plan:      1. Acute CVA with  Subarachnoid hemorrhage  - in the right frontal lobe and right occipital lobe    -  MRI- There are multiple foci of low signal foci on gradient echo images in the cerebral hemispheres and cerebellum. The low signal focus in the right frontal  lobe, right parietal lobe and left occipital lobe are acute foci of hemorrhage  based on CT and appears slightly larger on MR and findings most likely represent  vasculopathy possibly related to amyloid angiopathy.      No acute infarction identified on diffusion imaging. ...  There are moderate  changes small vessel disease periventricular white matter.     2.  Small left intraparenchymal hemorrhage stable seen by NSGY  - involving the left occipital lobe.    - Start CCB     3.  Status post fall -   - no hip or rib fractured  - SARH if pt need rehab  Re- eval PT/OT     4.  Expressive aphasia.    - resolevd     5.  Hypothyroidism - check TSH level.     7.  Hyponatremia, mild.  Repeat sodium levels.            DISCHARGE DIAGNOSES / PLAN:      1.  d/c to [facility]     ADDITIONAL CARE RECOMMENDATIONS:     PENDING TEST RESULTS:   At the time of discharge the following test results are still pending:     FOLLOW UP APPOINTMENTS:    Follow-up Information     Follow up With Specialties Details Why Judith Ly MD Internal Medicine In 1 week  [facility] [address]  [address] [address] [facility]      Yannick Vincent MD Neurology In 4 weeks  [address] [facility]  [phone]               DIET: Regular Diet and Cardiac Diet    ACTIVITY: Activity as tolerated    WOUND CARE:     EQUIPMENT needed:       DISCHARGE MEDICATIONS:  Current Discharge Medication List      START taking these medications    Details   amLODIPine (NORVASC) 5 mg tablet Take 1 Tab by mouth daily for 30 days. Qty: 30 Tab, Refills: 0         CONTINUE these medications which have NOT CHANGED    Details   ergocalciferol (ERGOCALCIFEROL) 50,000 unit capsule Take 50,000 Units by mouth every seven (7) days. levothyroxine (SYNTHROID) 25 mcg tablet TAKE 1 TABLET IN THE MORNING BEFORE BREAKFAST  Qty: 60 Tab, Refills: 6      cycloSPORINE (RESTASIS) 0.05 % ophthalmic emulsion Administer 1 Drop to both eyes two (2) times a day. 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     Associated Diagnoses: Thyrotoxicosis without mention of goiter or other cause, without mention of thyrotoxic crisis or storm      omega-3 fatty acids-vitamin e (FISH OIL) 1,000 mg cap Take 1 Cap by mouth. Associated Diagnoses: Thyrotoxicosis without mention of goiter or other cause, without mention of thyrotoxic crisis or storm      lutein 20 mg cap Take 1 Tab by mouth daily. Associated Diagnoses: Thyrotoxicosis without mention of goiter or other cause, without mention of thyrotoxic crisis or storm               NOTIFY YOUR PHYSICIAN FOR ANY OF THE FOLLOWING:   Fever over 101 degrees for 24 hours. Chest pain, shortness of breath, fever, chills, nausea, vomiting, diarrhea, change in mentation, falling, weakness, bleeding. Severe pain or pain not relieved by medications. Or, any other signs or symptoms that you may have questions about.     DISPOSITION:    Home With:   OT  PT  HH  RN      x Long term SNF/Inpatient Rehab    Independent/assisted living    Hospice    Other:       PATIENT CONDITION AT DISCHARGE:     Functional status    Poor    x Deconditioned     Independent      Cognition   x  Lucid     Forgetful     Dementia      Catheters/lines (plus indication)    Schwartz     PICC     PEG    x None      Code status     Full code    x DNR      PHYSICAL EXAMINATION AT DISCHARGE:   Refer to Progress Note      CHRONIC MEDICAL DIAGNOSES:  Problem List as of 3/21/2019 Date Reviewed: 3/21/2019          Codes Class Noted - Resolved    Acquired hypothyroidism ICD-10-CM: E03.9  ICD-9-CM: 244.9  3/17/2019 - Present        RESOLVED: Aphasia ICD-10-CM: R47.01  ICD-9-CM: 784.3  3/18/2019 - 3/20/2019        RESOLVED: [facility]int[facility] hemorrhage) ([facility] ICD-10-CM: I61.9  ICD-9-CM: 151  3/18/2019 - 3/20/2019        RESOLVED: CVA (cerebral vascular accident) ([facility] ICD-10-CM: I63.9  ICD-9-CM: 434.91  3/17/2019 - 3/20/2019              Greater than 30  minutes were spent with the patient on counseling and coordination of care    Signed: Miladis Claire MD  3/21/2019  10:28 AM

## 2019-03-21 NOTE — PROGRESS NOTES
CM received a call from [facility] for [facility]. She stated that she has an available bed for this pt at [address] [place] [address] today. CM informed attending and he agreed with d/c. CM informed pt and she is in agreement. She stated that her son is aware that she will be going to [address] [place] [address] today. CM set up ambulance transport for 4:30pm today. An envelope containing pt's kardex, MAR, AVS and emtala will be sent with this pt to [address] [place] [address] today. Also, pt's nurse will call report. Hemalatha Anderson

## 2019-03-21 NOTE — PROGRESS NOTES
Problem: Mobility Impaired (Adult and Pediatric) Goal: *Acute Goals and Plan of Care (Insert Text) Description Physical Therapy Goals Initiated 3/19/2019 1. Patient will move from supine to sit and sit to supine  and scoot up and down in bed with supervision/set-up within 7 day(s). 2.  Patient will transfer from bed to chair and chair to bed with minimal assistance/contact guard assist using the least restrictive device within 7 day(s). 3.  Patient will perform sit to stand with minimal assistance/contact guard assist within 7 day(s). 4.  Patient will ambulate with minimal assistance/contact guard assist for 150 feet with the least restrictive device within 7 day(s). 5.  Patient will improve Holland Balance score by 7 points within 7 days. Outcome: Progressing Towards Goal 
 
PHYSICAL THERAPY Neuro TREATMENT Patient: Queenie Benito (3 y.o. female) Date: 3/21/2019 Diagnosis: ICH (intracerebral hemorrhage) ([facility]) [I61.9] SAH (subarachnoid hemorrhage) ([facility] [I60.9] Aphasia [R47.01] <principal problem not specified> Precautions: Fall, DNR Chart, physical therapy assessment, plan of care and goals were reviewed. ASSESSMENT: 
Received pt semi out of bed - impulsive and perseverative on \"getting dressed to leave\" (pending discharge to rehab later today). Provided education on use of call light, waiting for assistance, and falls risk. She remains limited by poor insight, STM loss, impaired sense of midline, impaired balance, impaired gait, and decreased attention/concentration limiting ability to complete mobility tasks as per PLOF. She required min A today for bed mobility, transfers, and gait training with HHA x1 with constant manual facilitation needed to maintain COG fwd over CAROLYN and experienced several posterior LOBs when external assist was lessened.  Demos fair/poor standing balance while completing multi level reaching tasks. Continue to recommend discharge to acute IP rehab setting prior to returning home alone. The following are barriers to independence while in acute care:  
-Cognitive and/or behavioral: attention to task, safety awareness, insight into deficits, insight into abilities and short term memory loss 
-Medical condition: strength, functional endurance, standing balance, medical history and coordination   
-Other:    
 
Prior level of function: indep with all ADLs and mobility-  very active PLAN: 
Patient continues to benefit from skilled intervention to address the above impairments. Continue treatment per established plan of care. Recommendations and/or planned interventions for staff: OOB to chair TID and ambulating to/from bathroom with assist x1 Discharge recommendations: Rehab at inpatient facility: patient can tolerate 3 hours of therapy If above is not an option then recommend: Rehab at skilled nursing facility (SNF) Patient's barriers to discharging home, in addition to above impairments: lives alone 
family availability to assist 
total assist driving to follow up medical appointment(s)/groceries/obtain medication 
level of physical assist required to maintain patient safety. Equipment recommendations for successful discharge (if) home: TBD SUBJECTIVE:  
Patient stated ? I just don't want to be rushed, Ill get ready myself if you just get it out for me.? OBJECTIVE DATA SUMMARY:  
Critical Behavior: 
Neurologic State: Alert, Eyes open spontaneously Orientation Level: Oriented X4 Cognition: Follows commands, Appropriate decision making, Appropriate for age attention/concentration, Appropriate safety awareness Safety/Judgement: Awareness of environment, Fall prevention Functional Mobility Training: 
Bed Mobility: 
Supine to Sit: Minimum assistance Sit to Supine: Minimum assistance Transfers: 
Sit to Stand: Minimum assistance Stand to Sit: Minimum assistance Balance: Sitting: Impaired Sitting - Static: Good (unsupported) Sitting - Dynamic: Fair (occasional) Standing: Impaired Standing - Static: Fair;Constant support Standing - Dynamic : Poor;Fair(fluctuates with task and fatigue) Ambulation/Gait Training: 
Distance (ft): 40 Feet (ft) Assistive Device: Gait belt(HHA) Ambulation - Level of Assistance: Minimal assistance Gait Abnormalities: Ataxic; Altered arm swing;Decreased step clearance; Path deviations; Shuffling gait;Trunk sway increased Base of Support: Narrowed; Center of gravity altered Speed/Latia: Slow Step Length: Left shortened;Right shortened Activity Tolerance:  
Good Please refer to the flowsheet for vital signs taken during this treatment. After treatment patient left:  
Supine in bed Bed alarm/tab alert on Call light within reach RN notified COMMUNICATION/COLLABORATION:  
The patient?s plan of care was discussed with: Registered Nurse Ilan Rivas, PT, DPT Time Calculation: 17 mins

## 2019-03-21 NOTE — PROGRESS NOTES
Bedside and Verbal shift change report given to Marcus Chapa RN (oncoming nurse) by Obed Grady RN (offgoing nurse). Report included the following information SBAR, Intake/Output, MAR and Cardiac Rhythm NSR.

## 2019-03-21 NOTE — PROGRESS NOTES
Stroke Education documented in Patient Education: YES Core Measures Documented in [facility]: 
Risk Factors: YES Warning signs of stroke: YES When to Activate 911: YES Medication Education for Risk Factors: YES Smoking cessation if applicable: YES Written Education Given:  Rosy Cintron Discharge NIH Completed: YES Score: 0 BRAINS: NO Follow Up Appointment Made: NO, [facility] Date/Time if applicable: One month after rehab with Dr. Ritchie Felix Bedside RN performed patient education and medication education. Discharge concerns initiated and discussed with patient, including clarification on \"who\" assists the patient at their home and instructions for when the home going patient should call their provider after discharge. Opportunity for questions and clarification was provided. Patient receptive to education: YES Patient stated: \"Can you call Deanna Grande? \" 
Barriers to Education: None Diagnosis Education given:  NO Length of stay: 3 Expected Day of Discharge: 3/21/19 Ask if they have \"Help at Home\" & add to white board? YES Education Day #: 3 Medication Education Given:  YES 
M in the box Medication name: Amlodipine Pt aware of HCAHPS survey: NO 
 
I have reviewed discharge instructions with the patient and son. The patient  verbalized understanding. Patient is being discharged to [address] [place] [address] via ambulance. IV removed. Belongings with patient. Report called to [facility]. Discharge also reviewed with son.

## 2019-03-22 LAB
25(OH)D3 SERPL-MCNC: 78.8 NG/ML (ref 30–100)
ALBUMIN SERPL-MCNC: 2.9 G/DL (ref 3.5–5)
ALBUMIN/GLOB SERPL: 0.9 {RATIO} (ref 1.1–2.2)
ALP SERPL-CCNC: 49 U/L (ref 45–117)
ALT SERPL-CCNC: 14 U/L (ref 12–78)
ANION GAP SERPL CALC-SCNC: 6 MMOL/L (ref 5–15)
APPEARANCE UR: CLEAR
AST SERPL-CCNC: 16 U/L (ref 15–37)
BACTERIA URNS QL MICRO: NEGATIVE /HPF
BILIRUB SERPL-MCNC: 0.5 MG/DL (ref 0.2–1)
BILIRUB UR QL: NEGATIVE
BUN SERPL-MCNC: 15 MG/DL (ref 6–20)
BUN/CREAT SERPL: 25 (ref 12–20)
CALCIUM SERPL-MCNC: 8.6 MG/DL (ref 8.5–10.1)
CHLORIDE SERPL-SCNC: 104 MMOL/L (ref 97–108)
CO2 SERPL-SCNC: 24 MMOL/L (ref 21–32)
COLOR UR: NORMAL
CREAT SERPL-MCNC: 0.59 MG/DL (ref 0.55–1.02)
EPITH CASTS URNS QL MICRO: NORMAL /LPF
ERYTHROCYTE [DISTWIDTH] IN BLOOD BY AUTOMATED COUNT: 12.3 % (ref 11.5–14.5)
GLOBULIN SER CALC-MCNC: 3.4 G/DL (ref 2–4)
GLUCOSE SERPL-MCNC: 95 MG/DL (ref 65–100)
GLUCOSE UR STRIP.AUTO-MCNC: NEGATIVE MG/DL
HCT VFR BLD AUTO: 35.6 % (ref 35–47)
HGB BLD-MCNC: 12 G/DL (ref 11.5–16)
HGB UR QL STRIP: NEGATIVE
HYALINE CASTS URNS QL MICRO: NORMAL /LPF (ref 0–5)
KETONES UR QL STRIP.AUTO: NEGATIVE MG/DL
LEUKOCYTE ESTERASE UR QL STRIP.AUTO: NEGATIVE
MCH RBC QN AUTO: 32.3 PG (ref 26–34)
MCHC RBC AUTO-ENTMCNC: 33.7 G/DL (ref 30–36.5)
MCV RBC AUTO: 95.7 FL (ref 80–99)
NITRITE UR QL STRIP.AUTO: NEGATIVE
NRBC # BLD: 0 K/UL (ref 0–0.01)
NRBC BLD-RTO: 0 PER 100 WBC
PH UR STRIP: 6 [PH] (ref 5–8)
PLATELET # BLD AUTO: 171 K/UL (ref 150–400)
PMV BLD AUTO: 8.9 FL (ref 8.9–12.9)
POTASSIUM SERPL-SCNC: 3.9 MMOL/L (ref 3.5–5.1)
PROT SERPL-MCNC: 6.3 G/DL (ref 6.4–8.2)
PROT UR STRIP-MCNC: NEGATIVE MG/DL
RBC # BLD AUTO: 3.72 M/UL (ref 3.8–5.2)
RBC #/AREA URNS HPF: NORMAL /HPF (ref 0–5)
SODIUM SERPL-SCNC: 134 MMOL/L (ref 136–145)
SP GR UR REFRACTOMETRY: 1.01 (ref 1–1.03)
UA: UC IF INDICATED,UAUC: NORMAL
UROBILINOGEN UR QL STRIP.AUTO: 0.2 EU/DL (ref 0.2–1)
WBC # BLD AUTO: 4.4 K/UL (ref 3.6–11)
WBC URNS QL MICRO: NORMAL /HPF (ref 0–4)

## 2019-03-22 PROCEDURE — 85027 COMPLETE CBC AUTOMATED: CPT

## 2019-03-22 PROCEDURE — 82306 VITAMIN D 25 HYDROXY: CPT

## 2019-03-22 PROCEDURE — 81001 URINALYSIS AUTO W/SCOPE: CPT

## 2019-03-22 PROCEDURE — 80053 COMPREHEN METABOLIC PANEL: CPT

## 2019-03-22 PROCEDURE — 36415 COLL VENOUS BLD VENIPUNCTURE: CPT

## 2019-03-25 LAB
ANION GAP SERPL CALC-SCNC: 6 MMOL/L (ref 5–15)
BUN SERPL-MCNC: 19 MG/DL (ref 6–20)
BUN/CREAT SERPL: 30 (ref 12–20)
CALCIUM SERPL-MCNC: 8.5 MG/DL (ref 8.5–10.1)
CHLORIDE SERPL-SCNC: 102 MMOL/L (ref 97–108)
CO2 SERPL-SCNC: 26 MMOL/L (ref 21–32)
CREAT SERPL-MCNC: 0.64 MG/DL (ref 0.55–1.02)
ERYTHROCYTE [DISTWIDTH] IN BLOOD BY AUTOMATED COUNT: 12 % (ref 11.5–14.5)
GLUCOSE SERPL-MCNC: 88 MG/DL (ref 65–100)
HCT VFR BLD AUTO: 38.9 % (ref 35–47)
HGB BLD-MCNC: 13.1 G/DL (ref 11.5–16)
MCH RBC QN AUTO: 32.2 PG (ref 26–34)
MCHC RBC AUTO-ENTMCNC: 33.7 G/DL (ref 30–36.5)
MCV RBC AUTO: 95.6 FL (ref 80–99)
NRBC # BLD: 0 K/UL (ref 0–0.01)
NRBC BLD-RTO: 0 PER 100 WBC
PLATELET # BLD AUTO: 211 K/UL (ref 150–400)
PMV BLD AUTO: 9.1 FL (ref 8.9–12.9)
POTASSIUM SERPL-SCNC: 4 MMOL/L (ref 3.5–5.1)
RBC # BLD AUTO: 4.07 M/UL (ref 3.8–5.2)
SODIUM SERPL-SCNC: 134 MMOL/L (ref 136–145)
WBC # BLD AUTO: 4.1 K/UL (ref 3.6–11)

## 2019-03-25 PROCEDURE — 36415 COLL VENOUS BLD VENIPUNCTURE: CPT

## 2019-03-25 PROCEDURE — 85027 COMPLETE CBC AUTOMATED: CPT

## 2019-03-25 PROCEDURE — 80048 BASIC METABOLIC PNL TOTAL CA: CPT

## 2019-03-28 LAB
ANION GAP SERPL CALC-SCNC: 5 MMOL/L (ref 5–15)
BUN SERPL-MCNC: 16 MG/DL (ref 6–20)
BUN/CREAT SERPL: 25 (ref 12–20)
CALCIUM SERPL-MCNC: 9.4 MG/DL (ref 8.5–10.1)
CHLORIDE SERPL-SCNC: 100 MMOL/L (ref 97–108)
CO2 SERPL-SCNC: 30 MMOL/L (ref 21–32)
CREAT SERPL-MCNC: 0.64 MG/DL (ref 0.55–1.02)
ERYTHROCYTE [DISTWIDTH] IN BLOOD BY AUTOMATED COUNT: 11.9 % (ref 11.5–14.5)
GLUCOSE SERPL-MCNC: 89 MG/DL (ref 65–100)
HCT VFR BLD AUTO: 39.7 % (ref 35–47)
HGB BLD-MCNC: 13.2 G/DL (ref 11.5–16)
MCH RBC QN AUTO: 31.6 PG (ref 26–34)
MCHC RBC AUTO-ENTMCNC: 33.2 G/DL (ref 30–36.5)
MCV RBC AUTO: 95 FL (ref 80–99)
NRBC # BLD: 0 K/UL (ref 0–0.01)
NRBC BLD-RTO: 0 PER 100 WBC
PLATELET # BLD AUTO: 244 K/UL (ref 150–400)
PMV BLD AUTO: 9 FL (ref 8.9–12.9)
POTASSIUM SERPL-SCNC: 4.2 MMOL/L (ref 3.5–5.1)
RBC # BLD AUTO: 4.18 M/UL (ref 3.8–5.2)
SODIUM SERPL-SCNC: 135 MMOL/L (ref 136–145)
WBC # BLD AUTO: 4.5 K/UL (ref 3.6–11)

## 2019-03-28 PROCEDURE — 36415 COLL VENOUS BLD VENIPUNCTURE: CPT

## 2019-03-28 PROCEDURE — 85027 COMPLETE CBC AUTOMATED: CPT

## 2019-03-28 PROCEDURE — 80048 BASIC METABOLIC PNL TOTAL CA: CPT

## 2019-04-22 ENCOUNTER — OFFICE VISIT (OUTPATIENT)
Dept: NEUROLOGY | Age: 84
End: 2019-04-22

## 2019-04-22 VITALS
BODY MASS INDEX: 16.32 KG/M2 | WEIGHT: 104 LBS | HEART RATE: 68 BPM | SYSTOLIC BLOOD PRESSURE: 138 MMHG | RESPIRATION RATE: 16 BRPM | OXYGEN SATURATION: 98 % | DIASTOLIC BLOOD PRESSURE: 68 MMHG | HEIGHT: 67 IN

## 2019-04-22 DIAGNOSIS — I61.9 NONTRAUMATIC INTRACEREBRAL HEMORRHAGE, UNSPECIFIED CEREBRAL LOCATION, UNSPECIFIED LATERALITY (HCC): ICD-10-CM

## 2019-04-22 DIAGNOSIS — E85.4 CEREBRAL AMYLOID ANGIOPATHY (HCC): ICD-10-CM

## 2019-04-22 DIAGNOSIS — I63.9 CEREBROVASCULAR ACCIDENT (CVA), UNSPECIFIED MECHANISM (HCC): Primary | ICD-10-CM

## 2019-04-22 DIAGNOSIS — I68.0 CEREBRAL AMYLOID ANGIOPATHY (HCC): ICD-10-CM

## 2019-04-22 NOTE — LETTER
4/22/19 Patient: Chuck Colbert YOB: 1928 Date of Visit: 4/22/2019 Ramirez Everett MD 
[address]  [address] [address] [address] VIA Facsimile: [phone] Dear Ramirez Everett MD, Thank you for referring Ms. Yves Damon to [facility] for evaluation. My notes for this consultation are attached. If you have questions, please do not hesitate to call me. I look forward to following your patient along with you. Sincerely, Devorah Aguero MD

## 2019-04-22 NOTE — PATIENT INSTRUCTIONS
A Healthy Lifestyle: Care Instructions Your Care Instructions A healthy lifestyle can help you feel good, stay at a healthy weight, and have plenty of energy for both work and play. A healthy lifestyle is something you can share with your whole family. A healthy lifestyle also can lower your risk for serious health problems, such as high blood pressure, heart disease, and diabetes. You can follow a few steps listed below to improve your health and the health of your family. Follow-up care is a key part of your treatment and safety. Be sure to make and go to all appointments, and call your doctor if you are having problems. It's also a good idea to know your test results and keep a list of the medicines you take. How can you care for yourself at home? · Do not eat too much sugar, fat, or fast foods. You can still have dessert and treats now and then. The goal is moderation. · Start small to improve your eating habits. Pay attention to portion sizes, drink less juice and soda pop, and eat more fruits and vegetables. ? Eat a healthy amount of food. A 3-ounce serving of meat, for example, is about the size of a deck of cards. Fill the rest of your plate with vegetables and whole grains. ? Limit the amount of soda and sports drinks you have every day. Drink more water when you are thirsty. ? Eat at least 5 servings of fruits and vegetables every day. It may seem like a lot, but it is not hard to reach this goal. A serving or helping is 1 piece of fruit, 1 cup of vegetables, or 2 cups of leafy, raw vegetables. Have an apple or some carrot sticks as an afternoon snack instead of a candy bar. Try to have fruits and/or vegetables at every meal. 
· Make exercise part of your daily routine. You may want to start with simple activities, such as walking, bicycling, or slow swimming. Try to be active 30 to 60 minutes every day.  You do not need to do all 30 to 60 minutes all at once. For example, you can exercise 3 times a day for 10 or 20 minutes. Moderate exercise is safe for most people, but it is always a good idea to talk to your doctor before starting an exercise program. 
· Keep moving. Sakina Limb the lawn, work in the garden, or [facility]. Take the stairs instead of the elevator at work. · If you smoke, quit. People who smoke have an increased risk for heart attack, stroke, cancer, and other lung illnesses. Quitting is hard, but there are ways to boost your chance of quitting tobacco for good. ? Use nicotine gum, patches, or lozenges. ? Ask your doctor about stop-smoking programs and medicines. ? Keep trying. In addition to reducing your risk of diseases in the future, you will notice some benefits soon after you stop using tobacco. If you have shortness of breath or asthma symptoms, they will likely get better within a few weeks after you quit. · Limit how much alcohol you drink. Moderate amounts of alcohol (up to 2 drinks a day for men, 1 drink a day for women) are okay. But drinking too much can lead to liver problems, high blood pressure, and other health problems. Family health If you have a family, there are many things you can do together to improve your health. · Eat meals together as a family as often as possible. · Eat healthy foods. This includes fruits, vegetables, lean meats and dairy, and whole grains. · Include your family in your fitness plan. Most people think of activities such as jogging or tennis as the way to fitness, but there are many ways you and your family can be more active. Anything that makes you breathe hard and gets your heart pumping is exercise. Here are some tips: 
? Walk to do errands or to take your child to school or the bus. 
? Go for a family bike ride after dinner instead of watching TV. Where can you learn more? Go to http://magda-melissa.info/. Enter Q825 in the search box to learn more about \"A Healthy Lifestyle: Care Instructions. \" Current as of: September 11, 2018 Content Version: 11.9 © 8585-2590 [employer]. Care instructions adapted under license by [employer] (which disclaims liability or warranty for this information). If you have questions about a medical condition or this instruction, always ask your healthcare professional. [place] [address] any warranty or liability for your use of this information.

## 2019-04-22 NOTE — PROGRESS NOTES
Neurology Consult Note HISTORY PROVIDED BY: patient Chief Complaint:  
Chief Complaint Patient presents with  Stroke Subjective:  
 Julieta Gates is a 80 y.o. right handed female initially and last seen while hospitalized 3/18/19 - 3/21/19 after presenting to [facility] with aphasia, right sided weakness. CT head, CTA H/N - without acute findings, no LVO. S/p IV TPA, noted to have worsening aphasia, repeat head CT with SAH in right frontal and occipital lobe and left occipital IPH. Exam with mild expressive aphasia, improved today. 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 MRI brain wo contrast 3/18/19 with stable hemorrhages, multiple foci of low signal on GRE c/w cerebral amyloid angiopathy, no DWI + lesions, mild CIWM disease. Discussed diagnosis of CAA with patient and family. Discussed avoiding antiplatelet drugs and anticoagulation due to increase risk of cerebral hemorrhage. Discussed association of dementia with CAA. Discussed living arrangement and may need increased oversight after d/c.  
-No antiplatelet agents 
-No anticoagulation 
-PT/OT/ST She returns for f/u. She is continuing to live by herself, noting that the one \"hiccup\" with this is taking a shower with no one in the apartment. Now has a neighbor that would stay there with her, now has a shower stool. Now she feels cured from her fear of showering alone. She is not driving yet per [employer] [place] [employer]. Her therapy has ended. She is doing well from a language standpoint, but still struggles with a few words. Past Medical History:  
Diagnosis Date  Cerebral amyloid angiopathy ([address]  CVA (cerebral vascular accident) ([facility] [address] 03/2019  
 aphasia and right sided weakness, s/p IV TPA with subsequent SAH/IPH, found to have change c/w CAA  Hypothyroid Past Surgical History:  
Procedure Laterality Date  HX BACK SURGERY    
 HX CORNEAL TRANSPLANT  HX HIP REPLACEMENT    
 right  HX HYSTERECTOMY  HX TONSILLECTOMY Social History Socioeconomic History  Marital status:  Spouse name: Not on file  Number of children: Not on file  Years of education: Not on file  Highest education level: Not on file Occupational History  Not on file Social Needs  Financial resource strain: Not on file  Food insecurity:  
  Worry: Not on file Inability: Not on file  Transportation needs:  
  Medical: Not on file Non-medical: Not on file Tobacco Use  Smoking status: Never Smoker  Smokeless tobacco: Never Used Substance and Sexual Activity  Alcohol use: Yes Alcohol/week: 0.5 oz Types: 1 Glasses of wine per week  Drug use: No  
 Sexual activity: Not Currently Lifestyle  Physical activity:  
  Days per week: Not on file Minutes per session: Not on file  Stress: Not on file Relationships  Social connections:  
  Talks on phone: Not on file Gets together: Not on file Attends Taoist service: Not on file Active member of club or organization: Not on file Attends meetings of clubs or organizations: Not on file Relationship status: Not on file  Intimate partner violence:  
  Fear of current or ex partner: Not on file Emotionally abused: Not on file Physically abused: Not on file Forced sexual activity: Not on file Other Topics Concern  Not on file Social History Narrative  Not on file Family History Problem Relation Age of Onset  Dementia Mother Dec 96yo, had memory loss for the last few years. Objective:  
Review of Systems Constitutional: Negative. HENT: Positive for hearing loss. Eyes: Negative. Respiratory: Negative. Cardiovascular: Positive for leg swelling. Gastrointestinal: Negative. Genitourinary: Negative. Musculoskeletal: Negative. Skin: Negative. Neurological: Positive for dizziness. Endo/Heme/Allergies: Negative. Psychiatric/Behavioral: Positive for memory loss. No Known Allergies Meds: Outpatient Medications Prior to Visit Medication Sig Dispense Refill  levothyroxine (SYNTHROID) 25 mcg tablet TAKE 1 TABLET IN THE MORNING BEFORE BREAKFAST 60 Tab 6  cycloSPORINE (RESTASIS) 0.05 % ophthalmic emulsion Administer 1 Drop to both eyes two (2) times a day.  brinzolamide (AZOPT) 1 % ophthalmic suspension Administer 1 Drop to right eye two (2) times a day.  brimonidine-timolol (COMBIGAN) 0.2-0.5 % Drop ophthalmic solution Administer 1 Drop to right eye every twelve (12) hours.  ergocalciferol (ERGOCALCIFEROL) 50,000 unit capsule Take 50,000 Units by mouth every seven (7) days.  omega-3 fatty acids-vitamin e (FISH OIL) 1,000 mg cap Take 1 Cap by mouth.  lutein 20 mg cap Take 1 Tab by mouth daily.  naproxen sodium (NAPROSYN) 220 mg tablet Take 220 mg by mouth as needed. No facility-administered medications prior to visit. Imaging: MRI Results (most recent): 
Results from Hospital Encounter encounter on 03/18/19 MRI BRAIN WO CONT Narrative EXAM:  MRI BRAIN WO CONT INDICATION:  CVA COMPARISON: CT head. CONTRAST:  None. TECHNIQUE: Sagittal T1, axial FLAIR, T2,T1 and gradient echo images as well as 
coronal T2 weighted images and axial diffusion weighted images of the head were 
obtained. FINDINGS: There is mild prominence of the ventricles and sulci. There are 
moderate changes small vessel disease periventricular white matter. On diffusion imaging, an acute infarction is not definitely identified. On gradient echo imaging, there are multiple foci of low density identified in 
the cerebral hemispheres and in cerebellum. CT demonstrated a small focus of 
increased signal intensity in the left occipital lobe, right parietal lobe and 
right frontal lobe consistent with new small foci of hemorrhage. There is mild prominence transverse ligament posterior to the dens. Impression IMPRESSION:  
1. There are multiple foci of low signal foci on gradient echo images in the 
cerebral hemispheres and cerebellum. The low signal focus in the right frontal 
lobe, right parietal lobe and left occipital lobe are acute foci of hemorrhage 
based on CT and appears slightly larger on MR and findings most likely represent 
vasculopathy possibly related to amyloid angiopathy. No acute infarction identified on diffusion imaging. ... There are moderate 
changes small vessel disease periventricular white matter. CT Results (most recent): 
Results from Hospital Encounter encounter on 03/18/19 CT HEAD WO CONT Narrative EXAM:  CT HEAD WO CONT INDICATION:   24 hours post TPA 
 
COMPARISON: CT head 3/18/2019. TECHNIQUE: Unenhanced CT of the head was performed using 5 mm images. Brain and 
bone windows were generated. CT dose reduction was achieved through use of a 
standardized protocol tailored for this examination and automatic exposure 
control for dose modulation. FINDINGS: 
Stable small foci of subarachnoid hemorrhage in the right frontal and occipital 
lobes. Stable small intraparenchymal hemorrhage in the left occipital lobe now 
measuring 4 mm. No new hemorrhage is identified. Unchanged generalized 
parenchymal volume loss with commensurate dilation of the sulci and ventricular 
system. Unchanged confluent periventricular deep white matter hypodensity, 
consistent with severe chronic microvascular ischemic disease. No areas of acute 
infarction are identified. The paranasal sinuses, mastoid air cells, and middle ears are clear. The 
orbital contents are within normal limits with bilateral lens implants. There 
are no significant osseous or extracranial soft tissue lesions.  
  
 Impression IMPRESSION: 
1. Stable small foci of subarachnoid hemorrhage in the right frontal and 
 occipital lobes. Stable small intraparenchymal hemorrhage in the left occipital 
lobe. No new hemorrhage. Reviewed records in [facility] and media tab today Lab Review Results for orders placed or performed during the hospital encounter of 03/21/19 URINALYSIS W/ REFLEX CULTURE Result Value Ref Range Color YELLOW/STRAW Appearance CLEAR CLEAR Specific gravity 1.015 1.003 - 1.030    
 pH (UA) 6.0 5.0 - 8.0 Protein NEGATIVE  NEG mg/dL Glucose NEGATIVE  NEG mg/dL Ketone NEGATIVE  NEG mg/dL Bilirubin NEGATIVE  NEG Blood NEGATIVE  NEG Urobilinogen 0.2 0.2 - 1.0 EU/dL Nitrites NEGATIVE  NEG Leukocyte Esterase NEGATIVE  NEG    
 WBC 0-4 0 - 4 /hpf  
 RBC 0-5 0 - 5 /hpf Epithelial cells FEW FEW /lpf Bacteria NEGATIVE  NEG /hpf  
 UA:UC IF INDICATED CULTURE NOT INDICATED BY UA RESULT CNI Hyaline cast 0-2 0 - 5 /lpf  
CBC W/O DIFF Result Value Ref Range WBC 4.4 3.6 - 11.0 K/uL  
 RBC 3.72 (L) 3.80 - 5.20 M/uL  
 HGB 12.0 11.5 - 16.0 g/dL HCT 35.6 35.0 - 47.0 % MCV 95.7 80.0 - 99.0 FL  
 MCH 32.3 26.0 - 34.0 PG  
 MCHC 33.7 30.0 - 36.5 g/dL  
 RDW 12.3 11.5 - 14.5 % PLATELET 023 332 - 903 K/uL MPV 8.9 8.9 - 12.9 FL  
 NRBC 0.0 0  WBC ABSOLUTE NRBC 0.00 0.00 - 0.01 K/uL METABOLIC PANEL, COMPREHENSIVE Result Value Ref Range Sodium 134 (L) 136 - 145 mmol/L Potassium 3.9 3.5 - 5.1 mmol/L Chloride 104 97 - 108 mmol/L  
 CO2 24 21 - 32 mmol/L Anion gap 6 5 - 15 mmol/L Glucose 95 65 - 100 mg/dL BUN 15 6 - 20 MG/DL Creatinine 0.59 0.55 - 1.02 MG/DL  
 BUN/Creatinine ratio 25 (H) 12 - 20 GFR est AA >60 >60 ml/min/1.73m2 GFR est non-AA >60 >60 ml/min/1.73m2 Calcium 8.6 8.5 - 10.1 MG/DL Bilirubin, total 0.5 0.2 - 1.0 MG/DL  
 ALT (SGPT) 14 12 - 78 U/L  
 AST (SGOT) 16 15 - 37 U/L Alk. phosphatase 49 45 - 117 U/L Protein, total 6.3 (L) 6.4 - 8.2 g/dL Albumin 2.9 (L) 3.5 - 5.0 g/dL Globulin 3.4 2.0 - 4.0 g/dL A-G Ratio 0.9 (L) 1.1 - 2.2 VITAMIN D, 25 HYDROXY Result Value Ref Range Vitamin D 25-Hydroxy 78.8 30 - 100 ng/mL CBC W/O DIFF Result Value Ref Range WBC 4.1 3.6 - 11.0 K/uL  
 RBC 4.07 3.80 - 5.20 M/uL  
 HGB 13.1 11.5 - 16.0 g/dL HCT 38.9 35.0 - 47.0 % MCV 95.6 80.0 - 99.0 FL  
 MCH 32.2 26.0 - 34.0 PG  
 MCHC 33.7 30.0 - 36.5 g/dL  
 RDW 12.0 11.5 - 14.5 % PLATELET 267 849 - 377 K/uL MPV 9.1 8.9 - 12.9 FL  
 NRBC 0.0 0  WBC ABSOLUTE NRBC 0.00 0.00 - 0.01 K/uL METABOLIC PANEL, BASIC Result Value Ref Range Sodium 134 (L) 136 - 145 mmol/L Potassium 4.0 3.5 - 5.1 mmol/L Chloride 102 97 - 108 mmol/L  
 CO2 26 21 - 32 mmol/L Anion gap 6 5 - 15 mmol/L Glucose 88 65 - 100 mg/dL BUN 19 6 - 20 MG/DL Creatinine 0.64 0.55 - 1.02 MG/DL  
 BUN/Creatinine ratio 30 (H) 12 - 20 GFR est AA >60 >60 ml/min/1.73m2 GFR est non-AA >60 >60 ml/min/1.73m2 Calcium 8.5 8.5 - 10.1 MG/DL  
CBC W/O DIFF Result Value Ref Range WBC 4.5 3.6 - 11.0 K/uL  
 RBC 4.18 3.80 - 5.20 M/uL  
 HGB 13.2 11.5 - 16.0 g/dL HCT 39.7 35.0 - 47.0 % MCV 95.0 80.0 - 99.0 FL  
 MCH 31.6 26.0 - 34.0 PG  
 MCHC 33.2 30.0 - 36.5 g/dL  
 RDW 11.9 11.5 - 14.5 % PLATELET 586 235 - 277 K/uL MPV 9.0 8.9 - 12.9 FL  
 NRBC 0.0 0  WBC ABSOLUTE NRBC 0.00 0.00 - 0.01 K/uL METABOLIC PANEL, BASIC Result Value Ref Range Sodium 135 (L) 136 - 145 mmol/L Potassium 4.2 3.5 - 5.1 mmol/L Chloride 100 97 - 108 mmol/L  
 CO2 30 21 - 32 mmol/L Anion gap 5 5 - 15 mmol/L Glucose 89 65 - 100 mg/dL BUN 16 6 - 20 MG/DL Creatinine 0.64 0.55 - 1.02 MG/DL  
 BUN/Creatinine ratio 25 (H) 12 - 20 GFR est AA >60 >60 ml/min/1.73m2 GFR est non-AA >60 >60 ml/min/1.73m2 Calcium 9.4 8.5 - 10.1 MG/DL Exam: 
Visit Vitals /68 Pulse 68 Resp 16 Ht 5' 7\" (1.702 m) Wt 47.2 kg (104 lb) SpO2 98% BMI 16.29 kg/m² General:  Alert, cooperative, no distress. Head:  Normocephalic, without obvious abnormality, atraumatic. Respiratory: 
Heart:   Non labored breathing Regular rate and rhythm, no murmurs Neck:     
Extremities: Warm, no cyanosis or edema. Pulses: 2+ radial pulses. Neurologic:  MS: Alert and oriented x 4, speech intact. Language - word finding, able to name, repeat, and follow all commands. Attention and fund of knowledge appropriate. Recent and remote memory intact. Cranial Nerves: 
II: visual fields Full to confrontation II: pupils Equal, round, reactive to light II: optic disc   
III,VII: ptosis none III,IV,VI: extraocular muscles  EOMI, no nystagmus or diplopia V: facial light touch sensation  normal  
VII: facial muscle function   symmetric VIII: hearing intact IX: soft palate elevation  normal  
XI: trapezius strength XI: sternocleidomastoid strength XII: tongue  Midline Motor: normal bulk and tone, no tremor Strength: 5/5 throughout except 5-/5 right DF, no PD Sensory:  
Coordination: FTN intact Gait: normal gait, takes extra step to turn. Tandem not attempted. Reflexes: 2+ symmetric Assessment/Plan Pt is a 80 y.o. right handed female hospitalized 3/18/19 - 3/21/19 after presenting to [facility] with aphasia, right sided weakness. CT head, CTA H/N - without acute findings, no LVO. S/p IV TPA, noted to have worsening aphasia, repeat head CT with SAH in right frontal and occipital lobe and left occipital IPH. 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 MRI brain wo contrast 3/18/19 with stable hemorrhages, multiple foci of low signal on GRE c/w cerebral amyloid angiopathy, no DWI + lesions, mild CIWM disease. Exam today is significantly improved, mild word finding that could be c/w age, but pt noting that it is new since her stroke.  Reviewed MRI findings in PACS with pt. Discussed diagnosis of CAA. Discussed avoiding antiplatelet drugs and anticoagulation due to increase risk of cerebral hemorrhage. Discussed association of dementia with CAA. F/u as needed in neurology. ICD-10-CM ICD-9-CM 1. Cerebrovascular accident (CVA), unspecified mechanism ([facility] [address] I63.9 434.91   
2. Nontraumatic intracerebral hemorrhage, unspecified cerebral location, unspecified laterality ([facility] [address] I61.9 431 3. Cerebral amyloid angiopathy (HCC) E85.4 277.39   
 I68.0 437.9 Signed: Linus Mireles MD 
4/22/2019

## 2019-06-02 ENCOUNTER — HOSPITAL ENCOUNTER (EMERGENCY)
Age: 84
Discharge: HOME | End: 2019-06-02
Attending: EMERGENCY MEDICINE | Admitting: EMERGENCY MEDICINE
Payer: MEDICARE

## 2019-06-02 ENCOUNTER — APPOINTMENT (OUTPATIENT)
Dept: CT IMAGING | Age: 84
End: 2019-06-02
Attending: EMERGENCY MEDICINE
Payer: MEDICARE

## 2019-06-02 VITALS
SYSTOLIC BLOOD PRESSURE: 130 MMHG | TEMPERATURE: 98.7 F | HEIGHT: 67 IN | DIASTOLIC BLOOD PRESSURE: 55 MMHG | RESPIRATION RATE: 18 BRPM | OXYGEN SATURATION: 94 % | WEIGHT: 104 LBS | HEART RATE: 86 BPM | BODY MASS INDEX: 16.32 KG/M2

## 2019-06-02 DIAGNOSIS — S01.01XA LACERATION OF SCALP, INITIAL ENCOUNTER: ICD-10-CM

## 2019-06-02 DIAGNOSIS — S09.90XA INJURY OF HEAD, INITIAL ENCOUNTER: Primary | ICD-10-CM

## 2019-06-02 DIAGNOSIS — S61.412A SKIN TEAR OF LEFT HAND WITHOUT COMPLICATION, INITIAL ENCOUNTER: ICD-10-CM

## 2019-06-02 PROCEDURE — 72125 CT NECK SPINE W/O DYE: CPT

## 2019-06-02 PROCEDURE — 70450 CT HEAD/BRAIN W/O DYE: CPT

## 2019-06-02 PROCEDURE — 99284 EMERGENCY DEPT VISIT MOD MDM: CPT

## 2019-06-02 RX ORDER — BACITRACIN 500 [USP'U]/G
OINTMENT TOPICAL 3 TIMES DAILY
Qty: 1 TUBE | Refills: 0 | Status: SHIPPED | OUTPATIENT
Start: 2019-06-02 | End: 2019-06-09

## 2019-06-02 RX ORDER — BACITRACIN 500 UNIT/G
1 PACKET (EA) TOPICAL
Status: DISCONTINUED | OUTPATIENT
Start: 2019-06-02 | End: 2019-06-02 | Stop reason: HOSPADM

## 2019-06-02 NOTE — ED TRIAGE NOTES
Pt states she tripped and fell striking her left forehead and posterior head on the sidewalk. No LOC or neck pain. Lac present to the left temporal area and possibly posterior scalp (unable to visualize currently). Skin tear to the left hand.

## 2019-06-02 NOTE — DISCHARGE INSTRUCTIONS

## 2019-06-02 NOTE — ED PROVIDER NOTES
80 y.o. female with past medical history significant for hypothyroid disorder, CVA, and cerebral amyloid angiopathy who presents from [address] [place] [address]  with chief complaint of GLF. Pt complains of a fall secondary to tripping while walking on a side walk around her living community. Pt states she hit her head on the sidewalk. Per EMS, pt had an unwitnessed fall, but someone found her shortly after. Pt is noted to have skin tears to her left forehead and wrist. Pt notes she took 2 tylenol this morning. Pt notes she had a fall 2 days ago as well during exercise class and was seen by her PCP at that time. Pt notes she had a stroke on 3/17/2019. Pt denies LOC, dizziness, or wrist pain. There are no other acute medical concerns at this time. Social hx: Never Smoker. EtOH Use. PCP: Jose Pillai MD    Note written by Leonie Mejia. Brant Kwok, as dictated by Lexie Duffy MD 4:00 PM      The history is provided by the patient and the EMS personnel. Past Medical History:   Diagnosis Date    Cerebral amyloid angiopathy (HCC)     CVA (cerebral vascular accident) ([facility] [address] 03/2019    aphasia and right sided weakness, s/p IV TPA with subsequent SAH/IPH, found to have change c/w CAA    Hypothyroid        Past Surgical History:   Procedure Laterality Date    HX BACK SURGERY      HX CORNEAL TRANSPLANT      HX HIP REPLACEMENT      right    HX HYSTERECTOMY      HX TONSILLECTOMY           Family History:   Problem Relation Age of Onset    Dementia Mother         Dec 94yo, had memory loss for the last few years.        Social History     Socioeconomic History    Marital status:      Spouse name: Not on file    Number of children: Not on file    Years of education: Not on file    Highest education level: Not on file   Occupational History    Not on file   Social Needs    Financial resource strain: Not on file    Food insecurity:     Worry: Not on file     Inability: Not on file   [employer] needs: Medical: Not on file     Non-medical: Not on file   Tobacco Use    Smoking status: Never Smoker    Smokeless tobacco: Never Used   Substance and Sexual Activity    Alcohol use: Yes     Alcohol/week: 0.5 oz     Types: 1 Glasses of wine per week    Drug use: No    Sexual activity: Not Currently   Lifestyle    Physical activity:     Days per week: Not on file     Minutes per session: Not on file    Stress: Not on file   Relationships    Social connections:     Talks on phone: Not on file     Gets together: Not on file     Attends Hoahaoism service: Not on file     Active member of club or organization: Not on file     Attends meetings of clubs or organizations: Not on file     Relationship status: Not on file    Intimate partner violence:     Fear of current or ex partner: Not on file     Emotionally abused: Not on file     Physically abused: Not on file     Forced sexual activity: Not on file   Other Topics Concern    Not on file   Social History Narrative    Not on file         ALLERGIES: Patient has no known allergies. Review of Systems   Constitutional: Negative for fever. HENT: Negative for facial swelling. Eyes: Negative for visual disturbance. Respiratory: Negative for chest tightness. Cardiovascular: Negative for chest pain. Gastrointestinal: Negative for abdominal pain. Genitourinary: Negative for difficulty urinating and dysuria. Musculoskeletal: Negative for arthralgias. Skin: Positive for color change and wound. Negative for rash. Neurological: Negative for dizziness. Hematological: Negative for adenopathy. Psychiatric/Behavioral: Negative for suicidal ideas. All other systems reviewed and are negative. Vitals:    06/02/19 1601   BP: 130/55   Pulse: 86   Resp: 18   Temp: 98.7 °F (37.1 °C)   SpO2: 94%   Weight: 47.2 kg (104 lb)   Height: 5' 7\" (1.702 m)            Physical Exam   Constitutional: She is oriented to person, place, and time.  She appears well-developed and well-nourished. No distress. HENT:   Head: Normocephalic. Mouth/Throat: Oropharynx is clear and moist.   Contusion and skin tear to left forehead. Eyes: Pupils are equal, round, and reactive to light. No scleral icterus. Neck: Normal range of motion. Neck supple. No thyromegaly present. Cardiovascular: Normal rate, regular rhythm, normal heart sounds and intact distal pulses. No murmur heard. Pulmonary/Chest: Effort normal and breath sounds normal. No respiratory distress. Abdominal: Soft. Bowel sounds are normal. She exhibits no distension. There is no tenderness. Musculoskeletal: Normal range of motion. She exhibits no edema. Neurological: She is alert and oriented to person, place, and time. Skin: Skin is warm and dry. No rash noted. She is not diaphoretic. Skin tear to left wrist.  No active bleeding. Nursing note and vitals reviewed. Note written by Francis Patrick. Corey Fore, as dictated by Purnima Mills MD 4:17 PM      MDM       Procedures    PROGRESS NOTE:  4:49 PM  CT unremarkable. Superficial skin tears do not need sutures. Clean and bandaged by nurse. Stable for discharge.

## 2019-07-08 ENCOUNTER — APPOINTMENT (OUTPATIENT)
Dept: CT IMAGING | Age: 84
End: 2019-07-08
Attending: EMERGENCY MEDICINE
Payer: MEDICARE

## 2019-07-08 ENCOUNTER — HOSPITAL ENCOUNTER (OUTPATIENT)
Age: 84
Setting detail: OBSERVATION
Discharge: HOME | End: 2019-07-11
Attending: EMERGENCY MEDICINE | Admitting: INTERNAL MEDICINE
Payer: MEDICARE

## 2019-07-08 DIAGNOSIS — R47.01 EXPRESSIVE APHASIA: Primary | ICD-10-CM

## 2019-07-08 PROBLEM — E85.4 CEREBRAL AMYLOID ANGIOPATHY (HCC): Status: ACTIVE | Noted: 2019-07-08

## 2019-07-08 PROBLEM — G45.9 TIA (TRANSIENT ISCHEMIC ATTACK): Status: ACTIVE | Noted: 2019-07-08

## 2019-07-08 PROBLEM — R51.9 HEADACHE: Status: ACTIVE | Noted: 2019-07-08

## 2019-07-08 PROBLEM — I68.0 CEREBRAL AMYLOID ANGIOPATHY (HCC): Status: ACTIVE | Noted: 2019-07-08

## 2019-07-08 LAB
ALBUMIN SERPL-MCNC: 3.6 G/DL (ref 3.5–5)
ALBUMIN/GLOB SERPL: 0.9 {RATIO} (ref 1.1–2.2)
ALP SERPL-CCNC: 76 U/L (ref 45–117)
ALT SERPL-CCNC: 21 U/L (ref 12–78)
ANION GAP SERPL CALC-SCNC: 6 MMOL/L (ref 5–15)
APPEARANCE UR: CLEAR
APTT PPP: 25.9 SEC (ref 22.1–32)
AST SERPL-CCNC: 21 U/L (ref 15–37)
BACTERIA URNS QL MICRO: NEGATIVE /HPF
BASOPHILS # BLD: 0.1 K/UL (ref 0–0.1)
BASOPHILS NFR BLD: 1 % (ref 0–1)
BILIRUB SERPL-MCNC: 0.4 MG/DL (ref 0.2–1)
BILIRUB UR QL: NEGATIVE
BUN SERPL-MCNC: 15 MG/DL (ref 6–20)
BUN/CREAT SERPL: 20 (ref 12–20)
CALCIUM SERPL-MCNC: 8.4 MG/DL (ref 8.5–10.1)
CHLORIDE SERPL-SCNC: 101 MMOL/L (ref 97–108)
CO2 SERPL-SCNC: 26 MMOL/L (ref 21–32)
COLOR UR: ABNORMAL
COMMENT, HOLDF: NORMAL
CREAT SERPL-MCNC: 0.75 MG/DL (ref 0.55–1.02)
DIFFERENTIAL METHOD BLD: ABNORMAL
EOSINOPHIL # BLD: 0.2 K/UL (ref 0–0.4)
EOSINOPHIL NFR BLD: 3 % (ref 0–7)
EPITH CASTS URNS QL MICRO: ABNORMAL /LPF
ERYTHROCYTE [DISTWIDTH] IN BLOOD BY AUTOMATED COUNT: 11.9 % (ref 11.5–14.5)
GLOBULIN SER CALC-MCNC: 3.8 G/DL (ref 2–4)
GLUCOSE SERPL-MCNC: 94 MG/DL (ref 65–100)
GLUCOSE UR STRIP.AUTO-MCNC: NEGATIVE MG/DL
HCT VFR BLD AUTO: 40.5 % (ref 35–47)
HGB BLD-MCNC: 13.9 G/DL (ref 11.5–16)
HGB UR QL STRIP: NEGATIVE
IMM GRANULOCYTES # BLD AUTO: 0 K/UL (ref 0–0.04)
IMM GRANULOCYTES NFR BLD AUTO: 0 % (ref 0–0.5)
INR PPP: 1 (ref 0.9–1.1)
KETONES UR QL STRIP.AUTO: NEGATIVE MG/DL
LEUKOCYTE ESTERASE UR QL STRIP.AUTO: ABNORMAL
LYMPHOCYTES # BLD: 1.9 K/UL (ref 0.8–3.5)
LYMPHOCYTES NFR BLD: 29 % (ref 12–49)
MCH RBC QN AUTO: 33.3 PG (ref 26–34)
MCHC RBC AUTO-ENTMCNC: 34.3 G/DL (ref 30–36.5)
MCV RBC AUTO: 96.9 FL (ref 80–99)
MONOCYTES # BLD: 0.6 K/UL (ref 0–1)
MONOCYTES NFR BLD: 9 % (ref 5–13)
NEUTS SEG # BLD: 3.9 K/UL (ref 1.8–8)
NEUTS SEG NFR BLD: 58 % (ref 32–75)
NITRITE UR QL STRIP.AUTO: NEGATIVE
NRBC # BLD: 0 K/UL (ref 0–0.01)
NRBC BLD-RTO: 0 PER 100 WBC
PH UR STRIP: 6.5 [PH] (ref 5–8)
PLATELET # BLD AUTO: 230 K/UL (ref 150–400)
PMV BLD AUTO: 8.7 FL (ref 8.9–12.9)
POTASSIUM SERPL-SCNC: 3.5 MMOL/L (ref 3.5–5.1)
PROT SERPL-MCNC: 7.4 G/DL (ref 6.4–8.2)
PROT UR STRIP-MCNC: NEGATIVE MG/DL
PROTHROMBIN TIME: 9.9 SEC (ref 9–11.1)
RBC # BLD AUTO: 4.18 M/UL (ref 3.8–5.2)
RBC #/AREA URNS HPF: ABNORMAL /HPF (ref 0–5)
SAMPLES BEING HELD,HOLD: NORMAL
SODIUM SERPL-SCNC: 133 MMOL/L (ref 136–145)
SP GR UR REFRACTOMETRY: 1 (ref 1–1.03)
THERAPEUTIC RANGE,PTTT: NORMAL SECS (ref 58–77)
TROPONIN I SERPL-MCNC: <0.05 NG/ML
UR CULT HOLD, URHOLD: NORMAL
UROBILINOGEN UR QL STRIP.AUTO: 0.2 EU/DL (ref 0.2–1)
WBC # BLD AUTO: 6.7 K/UL (ref 3.6–11)
WBC URNS QL MICRO: ABNORMAL /HPF (ref 0–4)

## 2019-07-08 PROCEDURE — 70450 CT HEAD/BRAIN W/O DYE: CPT

## 2019-07-08 PROCEDURE — 81001 URINALYSIS AUTO W/SCOPE: CPT

## 2019-07-08 PROCEDURE — 84484 ASSAY OF TROPONIN QUANT: CPT

## 2019-07-08 PROCEDURE — 36415 COLL VENOUS BLD VENIPUNCTURE: CPT

## 2019-07-08 PROCEDURE — 99218 HC RM OBSERVATION: CPT

## 2019-07-08 PROCEDURE — 85610 PROTHROMBIN TIME: CPT

## 2019-07-08 PROCEDURE — 99285 EMERGENCY DEPT VISIT HI MDM: CPT

## 2019-07-08 PROCEDURE — 80053 COMPREHEN METABOLIC PANEL: CPT

## 2019-07-08 PROCEDURE — 85730 THROMBOPLASTIN TIME PARTIAL: CPT

## 2019-07-08 PROCEDURE — 85025 COMPLETE CBC W/AUTO DIFF WBC: CPT

## 2019-07-08 RX ORDER — ACETAMINOPHEN 325 MG/1
650 TABLET ORAL
Status: DISCONTINUED | OUTPATIENT
Start: 2019-07-08 | End: 2019-07-11 | Stop reason: HOSPADM

## 2019-07-08 RX ORDER — SODIUM CHLORIDE 0.9 % (FLUSH) 0.9 %
5-40 SYRINGE (ML) INJECTION AS NEEDED
Status: DISCONTINUED | OUTPATIENT
Start: 2019-07-08 | End: 2019-07-11 | Stop reason: HOSPADM

## 2019-07-08 RX ORDER — SODIUM CHLORIDE 0.9 % (FLUSH) 0.9 %
5-40 SYRINGE (ML) INJECTION EVERY 8 HOURS
Status: DISCONTINUED | OUTPATIENT
Start: 2019-07-08 | End: 2019-07-11 | Stop reason: HOSPADM

## 2019-07-08 RX ORDER — NALOXONE HYDROCHLORIDE 0.4 MG/ML
0.4 INJECTION, SOLUTION INTRAMUSCULAR; INTRAVENOUS; SUBCUTANEOUS AS NEEDED
Status: DISCONTINUED | OUTPATIENT
Start: 2019-07-08 | End: 2019-07-11 | Stop reason: HOSPADM

## 2019-07-08 RX ORDER — TOLTERODINE 2 MG/1
2 CAPSULE, EXTENDED RELEASE ORAL
COMMUNITY
End: 2020-06-29

## 2019-07-08 RX ORDER — ACETAMINOPHEN 650 MG/1
650 SUPPOSITORY RECTAL
Status: DISCONTINUED | OUTPATIENT
Start: 2019-07-08 | End: 2019-07-11 | Stop reason: HOSPADM

## 2019-07-09 ENCOUNTER — APPOINTMENT (OUTPATIENT)
Dept: VASCULAR SURGERY | Age: 84
End: 2019-07-09
Attending: INTERNAL MEDICINE
Payer: MEDICARE

## 2019-07-09 ENCOUNTER — HOSPITAL ENCOUNTER (OUTPATIENT)
Dept: MRI IMAGING | Age: 84
Discharge: HOME | End: 2019-07-09
Attending: INTERNAL MEDICINE
Payer: MEDICARE

## 2019-07-09 PROBLEM — E03.9 ACQUIRED HYPOTHYROIDISM: Chronic | Status: ACTIVE | Noted: 2019-03-17

## 2019-07-09 PROBLEM — I61.9 ICH (INTRACEREBRAL HEMORRHAGE) (HCC): Status: RESOLVED | Noted: 2019-03-18 | Resolved: 2019-07-09

## 2019-07-09 PROBLEM — R47.01 EXPRESSIVE APHASIA: Status: ACTIVE | Noted: 2019-07-09

## 2019-07-09 PROBLEM — I68.0 CEREBRAL AMYLOID ANGIOPATHY (HCC): Chronic | Status: ACTIVE | Noted: 2019-07-08

## 2019-07-09 PROBLEM — E85.4 CEREBRAL AMYLOID ANGIOPATHY (HCC): Chronic | Status: ACTIVE | Noted: 2019-07-08

## 2019-07-09 PROBLEM — M48.02 CERVICAL STENOSIS OF SPINE: Status: ACTIVE | Noted: 2019-07-09

## 2019-07-09 PROBLEM — I63.9 CVA (CEREBRAL VASCULAR ACCIDENT) (HCC): Status: RESOLVED | Noted: 2019-03-17 | Resolved: 2019-07-09

## 2019-07-09 PROBLEM — M48.02 CERVICAL STENOSIS OF SPINE: Chronic | Status: ACTIVE | Noted: 2019-07-09

## 2019-07-09 PROBLEM — G45.9 TIA (TRANSIENT ISCHEMIC ATTACK): Status: RESOLVED | Noted: 2019-07-08 | Resolved: 2019-07-09

## 2019-07-09 LAB
ALBUMIN SERPL-MCNC: 3.4 G/DL (ref 3.5–5)
ALBUMIN/GLOB SERPL: 1.1 {RATIO} (ref 1.1–2.2)
ALP SERPL-CCNC: 62 U/L (ref 45–117)
ALT SERPL-CCNC: 15 U/L (ref 12–78)
ANION GAP SERPL CALC-SCNC: 6 MMOL/L (ref 5–15)
AST SERPL-CCNC: 14 U/L (ref 15–37)
ATRIAL RATE: 68 BPM
BASOPHILS # BLD: 0 K/UL (ref 0–0.1)
BASOPHILS NFR BLD: 1 % (ref 0–1)
BILIRUB SERPL-MCNC: 0.3 MG/DL (ref 0.2–1)
BUN SERPL-MCNC: 10 MG/DL (ref 6–20)
BUN/CREAT SERPL: 15 (ref 12–20)
CALCIUM SERPL-MCNC: 8.3 MG/DL (ref 8.5–10.1)
CALCULATED P AXIS, ECG09: 74 DEGREES
CALCULATED R AXIS, ECG10: 39 DEGREES
CALCULATED T AXIS, ECG11: 69 DEGREES
CHLORIDE SERPL-SCNC: 105 MMOL/L (ref 97–108)
CHOLEST SERPL-MCNC: 163 MG/DL
CO2 SERPL-SCNC: 27 MMOL/L (ref 21–32)
CREAT SERPL-MCNC: 0.66 MG/DL (ref 0.55–1.02)
DIAGNOSIS, 93000: NORMAL
DIFFERENTIAL METHOD BLD: ABNORMAL
EOSINOPHIL # BLD: 0.1 K/UL (ref 0–0.4)
EOSINOPHIL NFR BLD: 3 % (ref 0–7)
ERYTHROCYTE [DISTWIDTH] IN BLOOD BY AUTOMATED COUNT: 11.7 % (ref 11.5–14.5)
EST. AVERAGE GLUCOSE BLD GHB EST-MCNC: 117 MG/DL
GLOBULIN SER CALC-MCNC: 3 G/DL (ref 2–4)
GLUCOSE SERPL-MCNC: 98 MG/DL (ref 65–100)
HBA1C MFR BLD: 5.7 % (ref 4.2–6.3)
HCT VFR BLD AUTO: 38.2 % (ref 35–47)
HDLC SERPL-MCNC: 46 MG/DL
HDLC SERPL: 3.5 {RATIO} (ref 0–5)
HGB BLD-MCNC: 13 G/DL (ref 11.5–16)
IMM GRANULOCYTES # BLD AUTO: 0 K/UL (ref 0–0.04)
IMM GRANULOCYTES NFR BLD AUTO: 0 % (ref 0–0.5)
LDLC SERPL CALC-MCNC: 96.2 MG/DL (ref 0–100)
LIPID PROFILE,FLP: NORMAL
LYMPHOCYTES # BLD: 1.5 K/UL (ref 0.8–3.5)
LYMPHOCYTES NFR BLD: 31 % (ref 12–49)
MAGNESIUM SERPL-MCNC: 2.2 MG/DL (ref 1.6–2.4)
MCH RBC QN AUTO: 31.7 PG (ref 26–34)
MCHC RBC AUTO-ENTMCNC: 34 G/DL (ref 30–36.5)
MCV RBC AUTO: 93.2 FL (ref 80–99)
MONOCYTES # BLD: 0.5 K/UL (ref 0–1)
MONOCYTES NFR BLD: 11 % (ref 5–13)
NEUTS SEG # BLD: 2.7 K/UL (ref 1.8–8)
NEUTS SEG NFR BLD: 54 % (ref 32–75)
NRBC # BLD: 0 K/UL (ref 0–0.01)
NRBC BLD-RTO: 0 PER 100 WBC
P-R INTERVAL, ECG05: 148 MS
PHOSPHATE SERPL-MCNC: 3.4 MG/DL (ref 2.6–4.7)
PLATELET # BLD AUTO: 204 K/UL (ref 150–400)
PMV BLD AUTO: 8.4 FL (ref 8.9–12.9)
POTASSIUM SERPL-SCNC: 3.8 MMOL/L (ref 3.5–5.1)
PROT SERPL-MCNC: 6.4 G/DL (ref 6.4–8.2)
Q-T INTERVAL, ECG07: 424 MS
QRS DURATION, ECG06: 80 MS
QTC CALCULATION (BEZET), ECG08: 450 MS
RBC # BLD AUTO: 4.1 M/UL (ref 3.8–5.2)
SODIUM SERPL-SCNC: 138 MMOL/L (ref 136–145)
TRIGL SERPL-MCNC: 104 MG/DL (ref ?–150)
TSH SERPL DL<=0.05 MIU/L-ACNC: 3.32 UIU/ML (ref 0.36–3.74)
VENTRICULAR RATE, ECG03: 68 BPM
VLDLC SERPL CALC-MCNC: 20.8 MG/DL
WBC # BLD AUTO: 5 K/UL (ref 3.6–11)

## 2019-07-09 PROCEDURE — 74011000250 HC RX REV CODE- 250: Performed by: INTERNAL MEDICINE

## 2019-07-09 PROCEDURE — 97530 THERAPEUTIC ACTIVITIES: CPT

## 2019-07-09 PROCEDURE — 84443 ASSAY THYROID STIM HORMONE: CPT

## 2019-07-09 PROCEDURE — 83036 HEMOGLOBIN GLYCOSYLATED A1C: CPT

## 2019-07-09 PROCEDURE — 93880 EXTRACRANIAL BILAT STUDY: CPT

## 2019-07-09 PROCEDURE — 93005 ELECTROCARDIOGRAM TRACING: CPT

## 2019-07-09 PROCEDURE — 70544 MR ANGIOGRAPHY HEAD W/O DYE: CPT

## 2019-07-09 PROCEDURE — 85025 COMPLETE CBC W/AUTO DIFF WBC: CPT

## 2019-07-09 PROCEDURE — 80061 LIPID PANEL: CPT

## 2019-07-09 PROCEDURE — 99218 HC RM OBSERVATION: CPT

## 2019-07-09 PROCEDURE — 83735 ASSAY OF MAGNESIUM: CPT

## 2019-07-09 PROCEDURE — 97116 GAIT TRAINING THERAPY: CPT

## 2019-07-09 PROCEDURE — 36415 COLL VENOUS BLD VENIPUNCTURE: CPT

## 2019-07-09 PROCEDURE — 74011250637 HC RX REV CODE- 250/637: Performed by: INTERNAL MEDICINE

## 2019-07-09 PROCEDURE — 92523 SPEECH SOUND LANG COMPREHEN: CPT

## 2019-07-09 PROCEDURE — 97535 SELF CARE MNGMENT TRAINING: CPT

## 2019-07-09 PROCEDURE — 70551 MRI BRAIN STEM W/O DYE: CPT

## 2019-07-09 PROCEDURE — 97165 OT EVAL LOW COMPLEX 30 MIN: CPT

## 2019-07-09 PROCEDURE — 84100 ASSAY OF PHOSPHORUS: CPT

## 2019-07-09 PROCEDURE — 80053 COMPREHEN METABOLIC PANEL: CPT

## 2019-07-09 PROCEDURE — 97161 PT EVAL LOW COMPLEX 20 MIN: CPT

## 2019-07-09 RX ORDER — DORZOLAMIDE HCL 20 MG/ML
1 SOLUTION/ DROPS OPHTHALMIC 2 TIMES DAILY
Status: DISCONTINUED | OUTPATIENT
Start: 2019-07-09 | End: 2019-07-11 | Stop reason: HOSPADM

## 2019-07-09 RX ORDER — TIMOLOL MALEATE 5 MG/ML
1 SOLUTION/ DROPS OPHTHALMIC EVERY 12 HOURS
Status: DISCONTINUED | OUTPATIENT
Start: 2019-07-09 | End: 2019-07-11 | Stop reason: HOSPADM

## 2019-07-09 RX ORDER — LEVOTHYROXINE SODIUM 25 UG/1
25 TABLET ORAL
Status: DISCONTINUED | OUTPATIENT
Start: 2019-07-09 | End: 2019-07-11 | Stop reason: HOSPADM

## 2019-07-09 RX ORDER — BRIMONIDINE TARTRATE 2 MG/ML
1 SOLUTION/ DROPS OPHTHALMIC EVERY 12 HOURS
Status: DISCONTINUED | OUTPATIENT
Start: 2019-07-09 | End: 2019-07-11 | Stop reason: HOSPADM

## 2019-07-09 RX ORDER — CYCLOSPORINE 0.5 MG/ML
1 EMULSION OPHTHALMIC 2 TIMES DAILY
Status: DISCONTINUED | OUTPATIENT
Start: 2019-07-09 | End: 2019-07-11 | Stop reason: HOSPADM

## 2019-07-09 RX ORDER — BRINZOLAMIDE 10 MG/ML
1 SUSPENSION/ DROPS OPHTHALMIC 2 TIMES DAILY
Status: DISCONTINUED | OUTPATIENT
Start: 2019-07-09 | End: 2019-07-09 | Stop reason: CLARIF

## 2019-07-09 RX ADMIN — Medication 10 ML: at 21:22

## 2019-07-09 RX ADMIN — CYCLOSPORINE 1 DROP: 0.5 EMULSION OPHTHALMIC at 10:04

## 2019-07-09 RX ADMIN — DORZOLAMIDE HYDROCHLORIDE 1 DROP: 20 SOLUTION/ DROPS OPHTHALMIC at 10:04

## 2019-07-09 RX ADMIN — DORZOLAMIDE HYDROCHLORIDE 1 DROP: 20 SOLUTION/ DROPS OPHTHALMIC at 18:22

## 2019-07-09 RX ADMIN — CYCLOSPORINE 1 DROP: 0.5 EMULSION OPHTHALMIC at 18:22

## 2019-07-09 RX ADMIN — Medication 10 ML: at 18:23

## 2019-07-09 RX ADMIN — BRIMONIDINE TARTRATE 1 DROP: 2 SOLUTION OPHTHALMIC at 10:04

## 2019-07-09 RX ADMIN — LEVOTHYROXINE SODIUM 25 MCG: 25 TABLET ORAL at 05:36

## 2019-07-09 RX ADMIN — Medication 10 ML: at 05:36

## 2019-07-09 RX ADMIN — TIMOLOL MALEATE 1 DROP: 5 SOLUTION/ DROPS OPHTHALMIC at 10:04

## 2019-07-09 RX ADMIN — Medication 10 ML: at 21:23

## 2019-07-09 NOTE — PROGRESS NOTES
[facility]: MED RECONCILIATION    Information obtained from: Patient, RX Query    Allergies: Patient has no known allergies. Prior to Admission Medications:     Medication Documentation Review Audit       Reviewed by Adrian Quach PHARMD (Pharmacist) on 07/08/19 at 2154      Medication Sig Documenting Provider Last Dose Status Taking?   brimonidine-timolol (COMBIGAN) 0.2-0.5 % Drop ophthalmic solution Administer 1 Drop to right eye every twelve (12) hours. Provider, Historical 7/8/2019 AM Active Yes   brinzolamide (AZOPT) 1 % ophthalmic suspension Administer 1 Drop to right eye two (2) times a day. Provider, Historical 7/8/2019 AM Active Yes   cycloSPORINE (RESTASIS) 0.05 % ophthalmic emulsion Administer 1 Drop to both eyes two (2) times a day. Provider, Historical 7/8/2019 AM Active Yes   levothyroxine (SYNTHROID) 25 mcg tablet TAKE 1 TABLET IN THE MORNING BEFORE BREAKFAST Nimisha Espitia MD 7/8/2019 AM Active Yes   tolterodine ER (DETROL-LA) 2 mg ER capsule Take 2 mg by mouth nightly.  Provider, Historical 7/7/2019 Active Yes                        [address] [facility], PHARMD   Contact: [phone]

## 2019-07-09 NOTE — ED NOTES
TRANSFER - OUT REPORT:    Verbal report given to Bruno RN(name) on [facility]  being transferred to [facility](unit) for routine progression of care       Report consisted of patients Situation, Background, Assessment and   Recommendations(SBAR). Information from the following report(s) SBAR, Kardex, ED Summary, Recent Results and Cardiac Rhythm NSR was reviewed with the receiving nurse. Lines:   Peripheral IV 07/08/19 Right Forearm (Active)   Site Assessment Clean, dry, & intact 7/8/2019  9:30 PM   Phlebitis Assessment 0 7/8/2019  9:30 PM   Infiltration Assessment 0 7/8/2019  9:30 PM   Dressing Status Clean, dry, & intact 7/8/2019  9:30 PM   Hub Color/Line Status Pink 7/8/2019  9:30 PM   Action Taken Blood drawn 7/8/2019  9:30 PM       Peripheral IV 07/08/19 Right Antecubital (Active)   Site Assessment Clean, dry, & intact 7/8/2019  9:31 PM   Phlebitis Assessment 0 7/8/2019  9:31 PM   Infiltration Assessment 0 7/8/2019  9:31 PM   Dressing Status Clean, dry, & intact 7/8/2019  9:31 PM   Hub Color/Line Status Pink 7/8/2019  9:31 PM        Opportunity for questions and clarification was provided.       Patient transported with:   Monitor  Registered Nurse

## 2019-07-09 NOTE — PROGRESS NOTES
Speech LAnguage Pathology evaluation/discharge  Patient: Jong Pond (11 y.o. female)  Date: 7/9/2019  Primary Diagnosis: TIA (transient ischemic attack) [G45.9]       Precautions:        ASSESSMENT :  Based on the objective data described below, the patient presents with functional swallow. Mild expressive aphasia and thought organization. Suspect most of this is premorbid from prior CVA in March. Admitted with expressive aphasia, headache, L arm pain. Head CT: negative. 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 .    Patient will benefit from skilled intervention to address the above impairments. Patients rehabilitation potential is considered to be Good  Factors which may influence rehabilitation potential include:   []              None noted  [x]              Mental ability/status  [x]              Medical condition  []              Home/family situation and support systems  []              Safety awareness  []              Pain tolerance/management  []              Other:      PLAN :  Recommendations and Planned Interventions:  Ok for diet as tolerated  Frequency/Duration: Patient will NOT be followed by speech-language pathology   Discharge Recommendations: Outpatient at [facility]     SUBJECTIVE:   Patient stated I just saw the Speech therapist at [facility] and she said 'I have an appointment with you.     OBJECTIVE:     Past Medical History:   Diagnosis Date    [facility]     CVA (cerebral vascular accident) ([facility] [address] 03/2019    aphasia and right sided weakness, s/p IV TPA with subsequent SAH/IPH, found to have change c/w CAA    Hypothyroid      Past Surgical History:   Procedure Laterality Date    HX BACK SURGERY      HX CORNEAL TRANSPLANT      HX HIP REPLACEMENT      right    HX HYSTERECTOMY      HX TONSILLECTOMY       Prior Level of Function/Home Situation:   Home Situation  [address] Name: [place]  One/Two Story Residence: One story  Living Alone: Yes  Support Systems: Assisted living  Patient Expects to be Discharged to[de-identified] Assisted living  Current DME Used/Available at Home: [address]  Mental Status:  Neurologic State: Alert  Orientation Level: Oriented X4  Cognition: Appropriate decision making, Follows commands  Perception: Appears intact  Perseveration: No perseveration noted  Safety/Judgement: Insight into deficits  Motor Speech:   Very mild R facial droop noted with ROM. Otherwise, WNL with own teeth. Language Comprehension and Expression:  Auditory Comprehension  Hearing Aid: Bilateral;At bedside      AUDITORY COMRPEHENSIOn  wfl  VERBAL EXPRESSION:   No aphasia. She did have some verbal organization issues  Delay in proper names retrieval    Patient describes an instance of speech apraxia yesterday that lasted a short time. She was also apparently completely exhausted from her travel abroad. Patient was seen in March 2019 with CVA. She was given TPA, then had ICH. At that time, SLP noted that she had brief, severe dysphagia and aphasia. This resolved within 48 hours to normal swallow and mild expressive aphasia. Neuro-Linguistics:                                                  Pragmatics:        Voice:                                                          NOMS: The NOMS functional outcome measure was used to quantify this patient's level of expressive language impairment. Based on the NOMS, the patient was determined to be at level 6 for spoken language expression. NOMS Spoken Language Expression:  Level 1 (CN): Verbalizations not meaningful to anyone. Level 2 (CM): Few attempts accurate/appropriate. Max cues to elicit automatic/imitative words/phrases. Level 3 (CL): Communication partner responsible for communication;  Mod cues for words/phrases meaningful in context  Level 4 (CK): Initiate during simple, routine activities w/familiar partner. Mod cues to produce simple sentences  Level 5 (Zoe Knock): Initiates communication with familiar and unfamiliar partners. Min cues for complex sentences. Level 6 (CI): Communicates for most activities. Some limitations still present for vocational/social activities. Rarely cued for complex info  Level 7 [facility]): Independent communication. SIMI. (2003). National Outcomes Measurement System (NOMS): Adult Speech-Language Pathology User's Guide. Pain:  Pain Scale 1: Numeric (0 - 10)  Pain Intensity 1: 0     After treatment:   []   Patient left in no apparent distress sitting up in chair  [x]   Patient left in no apparent distress in bed  []   Call bell left within reach  [x]   Nursing notified  []   Caregiver present  []   Bed alarm activated    COMMUNICATION/EDUCATION:   The patients plan of care including findings and recommendations was discussed with: Registered Nurse. Patient was educated regarding Her deficit(s) of aphasia as this relates to Her diagnosis of CVA. She demonstrated Good understanding as evidenced by discussion. .  []   Patient/family have participated as able and agree with findings and recommendations. []   Patient is unable to participate in plan of care at this time.     Thank you for this referral.  SAMINA Puente  Time Calculation: 20 mins

## 2019-07-09 NOTE — ED PROVIDER NOTES
80 y.o. female with past medical history significant for CVA and cerebral amyloid angiopathy who presents from [facility] via EMS with chief complaint of speech difficulty. EMS reports that pt's LKW was around [phone] today and notes she had difficulty finding the right words to speak for a while. Pt c/o diffuse HA right now. Pt reports she has had left arm pain intermittently for months, noting she has had increasing pain for the last couple of nights. She states that she has issues with balance at baseline. EMS notes that the pt's BS was 98 en route. Pt endorses use of ASA. EMS mentions pt had a recent long flight. Pt notes that she had a fall about 3 to 4 weeks ago. They mention pt has h/o vision problems since a previous traumatic injury. Pt denies any allergies to medication. There are no other acute medical concerns at this time. Social hx: Denies tobacco use; Occasional EtOH use  PCP: Ellie Mosqueda MD    Note written by Hugo Sierra, as dictated by Karly Huizar MD 9:09 PM        The history is provided by the patient and the EMS personnel. No  was used. Past Medical History:   Diagnosis Date    Cerebral amyloid angiopathy (HCC)     CVA (cerebral vascular accident) ([facility] [address] 03/2019    aphasia and right sided weakness, s/p IV TPA with subsequent SAH/IPH, found to have change c/w CAA    Hypothyroid        Past Surgical History:   Procedure Laterality Date    HX BACK SURGERY      HX CORNEAL TRANSPLANT      HX HIP REPLACEMENT      right    HX HYSTERECTOMY      HX TONSILLECTOMY           Family History:   Problem Relation Age of Onset    Dementia Mother         Dec 96yo, had memory loss for the last few years.        Social History     Socioeconomic History    Marital status:      Spouse name: Not on file    Number of children: Not on file    Years of education: Not on file    Highest education level: Not on file   Occupational History    Not on file   Social Needs    Financial resource strain: Not on file    Food insecurity:     Worry: Not on file     Inability: Not on file    Transportation needs:     Medical: Not on file     Non-medical: Not on file   Tobacco Use    Smoking status: Never Smoker    Smokeless tobacco: Never Used   Substance and Sexual Activity    Alcohol use: Yes     Alcohol/week: 0.5 oz     Types: 1 Glasses of wine per week    Drug use: No    Sexual activity: Not Currently   Lifestyle    Physical activity:     Days per week: Not on file     Minutes per session: Not on file    Stress: Not on file   Relationships    Social connections:     Talks on phone: Not on file     Gets together: Not on file     Attends Voodoo service: Not on file     Active member of club or organization: Not on file     Attends meetings of clubs or organizations: Not on file     Relationship status: Not on file    Intimate partner violence:     Fear of current or ex partner: Not on file     Emotionally abused: Not on file     Physically abused: Not on file     Forced sexual activity: Not on file   Other Topics Concern    Not on file   Social History Narrative    Not on file         ALLERGIES: Patient has no known allergies. Review of Systems   Constitutional: Negative for chills and fever. HENT: Negative for ear pain and sore throat. Eyes: Negative for pain. Respiratory: Negative for chest tightness and shortness of breath. Cardiovascular: Negative for chest pain. Gastrointestinal: Negative for abdominal pain. Genitourinary: Negative for flank pain. Musculoskeletal: Positive for myalgias. Negative for back pain. Skin: Negative for rash. Neurological: Positive for speech difficulty and headaches. All other systems reviewed and are negative.       Vitals:    07/08/19 2123   BP: 156/61   Pulse: 78   Resp: 15   Temp: 98.2 °F (36.8 °C)   SpO2: 98%   Weight: 48.3 kg (106 lb 6.4 oz)   Height: 5' 1\" (1.549 m)            Physical Exam   Constitutional: No distress. Elderly and Frail. HENT:   Head: Normocephalic and atraumatic. Mouth/Throat: Oropharynx is clear and moist.   Eyes: Pupils are equal, round, and reactive to light. Conjunctivae are normal. No scleral icterus. Neck: Neck supple. No tracheal deviation present. Cardiovascular: Normal rate, regular rhythm and intact distal pulses. Pulmonary/Chest: Effort normal. No respiratory distress. She has no wheezes. She has no rales. Abdominal: Soft. She exhibits no distension. There is no tenderness. Genitourinary:   Genitourinary Comments: deferred   Musculoskeletal: She exhibits no edema or deformity. Neurological: She is alert. No cranial nerve deficit. Alert and Oriented x3, Cranial nerves 2-12 intact, normal motor and sensation in upper and lower extremities, negative Romberg, no pronator drift, normal finger to nose, normal word finding. Skin: Skin is warm and dry. Psychiatric: She has a normal mood and affect. Nursing note and vitals reviewed. Note written by Hugo Ashton, as dictated by Lura Nissen, MD 9:09 PM  MDM       Procedures    CONSULT NOTE:  9:16 PM Clemencia Castellanos MD spoke with Dr. Zi Victor, Consult for [facility] neurology. Discussed available diagnostic tests and clinical findings. Dr. Zi Victor will see the pt. CONSULT NOTE:  9:37 PM Lura Nissen., MD spoke with Dr. Zi Victor, Consult for [facility] neurology. Discussed available diagnostic tests and clinical findings. Dr. Zi Victor advises admission for routine stroke work up but no CTA.            LABORATORY TESTS:  Labs Reviewed   CBC WITH AUTOMATED DIFF - Abnormal; Notable for the following components:       Result Value    MPV 8.7 (*)     All other components within normal limits   METABOLIC PANEL, COMPREHENSIVE - Abnormal; Notable for the following components:    Sodium 133 (*)     Calcium 8.4 (*)     A-G Ratio 0.9 (*)     All other components within normal limits URINE CULTURE HOLD SAMPLE   SAMPLES BEING HELD   TROPONIN I   PTT   PROTHROMBIN TIME + INR   URINALYSIS W/MICROSCOPIC   URINALYSIS W/ REFLEX CULTURE   LIPID PANEL   HEMOGLOBIN A1C WITH EAG   METABOLIC PANEL, COMPREHENSIVE   CBC WITH AUTOMATED DIFF   MAGNESIUM   PHOSPHORUS       IMAGING RESULTS:  Ct Code Neuro Head Wo Contrast    Result Date: 7/8/2019  INDICATION: Aphasia, started at 5:45pm. Exam: Noncontrast CT of the brain is performed with 5 mm collimation. CT dose reduction was achieved to the use of a standardized protocol tailored for this examination and automatic exposure control for dose modulation. FINDINGS: There is no acute intracranial hemorrhage, mass, mass effect or herniation. There is age-appropriate diffuse cortical atrophy with ex vacuo dilatation of the ventricular system. There are stable periventricular hypodense attenuation consistent with chronic microvascular ischemic changes. There is no evidence of acute territorial infarct. The mastoid air cells are well pneumatized. The visualized paranasal sinuses are normal.     IMPRESSION: No acute intracranial hemorrhage or infarct. MEDICATIONS GIVEN:  Medications   sodium chloride (NS) flush 5-40 mL (has no administration in time range)   sodium chloride (NS) flush 5-40 mL (has no administration in time range)   acetaminophen (TYLENOL) tablet 650 mg (has no administration in time range)     Or   acetaminophen (TYLENOL) solution 650 mg (has no administration in time range)     Or   acetaminophen (TYLENOL) suppository 650 mg (has no administration in time range)   sodium chloride (NS) flush 5-40 mL (has no administration in time range)   sodium chloride (NS) flush 5-40 mL (has no administration in time range)   acetaminophen (TYLENOL) tablet 650 mg (has no administration in time range)   naloxone (NARCAN) injection 0.4 mg (has no administration in time range)       IMPRESSION:  1. Expressive aphasia        PLAN:  1.  Admit to hospitalist    Total critical care time spent exclusive of procedures:  0 minutes      Manav Mata MD

## 2019-07-09 NOTE — PROGRESS NOTES
TRANSFER - IN REPORT:    Verbal report received from Valencia(name) on [facility]  being received from ED(unit) for routine progression of care      Report consisted of patients Situation, Background, Assessment and   Recommendations(SBAR). Information from the following report(s) SBAR, Intake/Output, MAR, Accordion and Cardiac Rhythm   was reviewed with the receiving nurse. Opportunity for questions and clarification was provided. Assessment completed upon patients arrival to unit and care assumed. 0700: Bedside and Verbal shift change report given to Natasha Rod (oncoming nurse) by Russel Garrido (offgoing nurse). Report included the following information SBAR, Intake/Output, MAR, Accordion and Cardiac Rhythm  .

## 2019-07-09 NOTE — PROGRESS NOTES
Reason for Admission:   TIA                   RRAT Score:     20             Do you (patient/family) have any concerns for transition/discharge? No concerns at this time               Plan for utilizing home health:   Npt opposed if recommended     Current Advanced Directive/Advance Care Plan:  DNR AD is not on file             Transition of Care Plan:    Patient lives in independent living [facility]. She lives alone. Pt stated that she drove prior to admission \"but only short distances\". Pt said that [address] [place] [address] also provides transportation. Pt's main contact is her son Nestor Sal cell is [phone]. Pt has prescription coverage under her insurance plan, she gets her prescriptions filled at [address]. Pt has had therapy through [address] [place] [address]. DME - pt has a cane and a walker. I will continue to follow and assist with discharge planning. 1. Return to independent living   2. Therapy recommendations   3.  Patient may need transportation   Care Management Interventions  PCP Verified by CM: Yes(Blanca Barnhart)  MyChart Signup: No  Discharge Durable Medical Equipment: No  Health Maintenance Reviewed: Yes  Physical Therapy Consult: Yes  Occupational Therapy Consult: Yes  Speech Therapy Consult: Yes  Current Support Network: Lives Alone  Plan discussed with Pt/Family/Caregiver: Yes  Discharge Location  Discharge Placement: [address] [facility]

## 2019-07-09 NOTE — PROGRESS NOTES
Hermilo Stover [facility] [address]  [address] [facility], [place], [address] [place] [address]  [phone]      Medical Progress Note      NAME: Hubert Rodriguez   :  7/3/1928  MRM:  [id number]    Date/Time: 2019  9:09 AM         Subjective:     Chief Complaint:  Expressive aphasia: mild, intermittent, improved overall     ROS:  (bold if positive, if negative)                        Tolerating Diet          Objective:       Vitals:          Last 24hrs VS reviewed since prior progress note.  Most recent are:    Visit Vitals  /67 (BP 1 Location: Left arm, BP Patient Position: At rest)   Pulse 73   Temp 97.5 °F (36.4 °C)   Resp 16   Ht 5' 1\" (1.549 m)   Wt 48.3 kg (106 lb 6.4 oz)   SpO2 96%   BMI 20.10 kg/m²     SpO2 Readings from Last 6 Encounters:   19 96%   19 94%   19 98%   19 98%   19 98%   14 96%            Intake/Output Summary (Last 24 hours) at 2019 0909  Last data filed at 2019 0735  Gross per 24 hour   Intake    Output 700 ml   Net -700 ml          Exam:     Physical Exam:    Gen:  Well-developed, well-nourished, elderly, in no acute distress  HEENT:  Pink conjunctivae, PERRL, hearing intact to voice, moist mucous membranes  Neck:  Supple, without masses, thyroid non-tender  Resp:  No accessory muscle use, clear breath sounds without wheezes rales or rhonchi  Card:  No murmurs, normal S1, S2 without thrills, bruits or peripheral edema  Abd:  Soft, non-tender, non-distended, normoactive bowel sounds are present, no palpable organomegaly and no detectable hernias  Lymph:  No cervical or inguinal adenopathy  Musc:  No cyanosis or clubbing  Skin:  No rashes or ulcers, skin turgor is good  Neuro:  Cranial nerves are grossly intact, no focal motor weakness, follows commands appropriately  Psych:  Good insight, oriented to person, place and time, alert       Telemetry reviewed:   normal sinus rhythm    Medications Reviewed: (see below)    Lab Data Reviewed: (see below)    ______________________________________________________________________    Medications:     Current Facility-Administered Medications   Medication Dose Route Frequency    cycloSPORINE (RESTASIS) 0.05 % ophthalmic emulsion 1 Drop  1 Drop Both Eyes BID    levothyroxine (SYNTHROID) tablet 25 mcg  25 mcg Oral 6am    brimonidine (ALPHAGAN) 0.2 % ophthalmic solution 1 Drop  1 Drop Right Eye Q12H    And    timolol (TIMOPTIC) 0.5 % ophthalmic solution 1 Drop  1 Drop Right Eye Q12H    dorzolamide (TRUSOPT) 2 % ophthalmic solution 1 Drop  1 Drop Right Eye BID    sodium chloride (NS) flush 5-40 mL  5-40 mL IntraVENous Q8H    sodium chloride (NS) flush 5-40 mL  5-40 mL IntraVENous PRN    acetaminophen (TYLENOL) tablet 650 mg  650 mg Oral Q4H PRN    Or    acetaminophen (TYLENOL) solution 650 mg  650 mg Per NG tube Q4H PRN    Or    acetaminophen (TYLENOL) suppository 650 mg  650 mg Rectal Q4H PRN    sodium chloride (NS) flush 5-40 mL  5-40 mL IntraVENous Q8H    sodium chloride (NS) flush 5-40 mL  5-40 mL IntraVENous PRN    acetaminophen (TYLENOL) tablet 650 mg  650 mg Oral Q6H PRN    naloxone (NARCAN) injection 0.4 mg  0.4 mg IntraVENous PRN            Lab Review:     Recent Labs     07/09/19  0401 07/08/19 2127   WBC 5.0 6.7   HGB 13.0 13.9   HCT 38.2 40.5    230     Recent Labs     07/09/19  0401 07/08/19 2127    133*   K 3.8 3.5    101   CO2 27 26   GLU 98 94   BUN 10 15   CREA 0.66 0.75   CA 8.3* 8.4*   MG 2.2  --    PHOS 3.4  --    ALB 3.4* 3.6   TBILI 0.3 0.4   SGOT 14* 21   ALT 15 21   INR  --  1.0     Lab Results   Component Value Date/Time    Glucose (POC) 106 (H) 03/17/2019 10:01 PM     No results for input(s): PH, PCO2, PO2, HCO3, FIO2 in the last 72 hours.   Recent Labs     07/08/19 2127   INR 1.0     No results found for: SDES  No results found for: CULT         Assessment:     Principal Problem:    Expressive aphasia (7/9/2019)    Active Problems:    Acquired hypothyroidism (3/17/2019)      Cerebral amyloid angiopathy ([address] (7/8/2019)      Headache (7/8/2019)           Plan:     Principal Problem:    Expressive aphasia (7/9/2019) with Cerebral amyloid angiopathy ([address] (7/8/2019)   - high risk of ICH so anticoagulation contraindicated   - Neurology to see   - MRI/MRA pending    Active Problems:    Acquired hypothyroidism (3/17/2019)   - continue LT4      Headache (7/8/2019)   - improved      Total time spent in patient care: 25 minutes                  Care Plan discussed with: Patient, Care Manager and Nursing Staff    Discussed:  Code Status, Care Plan and D/C Planning    Prophylaxis:  SCD's    Disposition:  Home w/Family           ___________________________________________________    Attending Physician: Vonda Cruz MD

## 2019-07-09 NOTE — PROGRESS NOTES
Problem: Self Care Deficits Care Plan (Adult)  Goal: *Acute Goals and Plan of Care (Insert Text)  Description  Occupational Therapy Goals  Initiated 7/9/2019  1. Patient will perform lower body dressing and bathing with modified independence within 7 day(s). 2.  Patient will perform toilet transfers with modified independence within 7 day(s). 3.  Patient will perform all aspects of toileting with modified independence within 7 day(s). 4.  Patient will participate in upper extremity therapeutic exercise/activities with independence for 10 minutes within 7 day(s). 5.  Patient will utilize energy conservation techniques during functional activities with verbal cues within 7 day(s). Outcome: Progressing Towards Goal   OCCUPATIONAL THERAPY EVALUATION  Patient: Pablo Rossi (85 y.o. female)  Date: 7/9/2019  Primary Diagnosis: TIA (transient ischemic attack) [G45.9]        Precautions: fall       ASSESSMENT :  Based on the objective data described below, the patient presents with decreased activity tolerance following admission on 7/8 for TIA with expressive aphasia. Patient currently undergoing neuro work-up with CT and MRI both negative for acute process but MRI did reveal \"Scattered foci of susceptibility artifact in both cerebral hemispheres in the right cerebellum due to remote hemorrhage. \"  Patient has a history significant for cerebral amyloid angiopathy and recent CVA (March 2019), ICH following tPA (L occipital). She went to SAH/inpatient rehab following stroke, discharged back to independent living, and was managing care independently prior to this admission. She just traveled internationally to [facility] for family reunion, returned home the day prior to admit, and reports being generally fatigued from traveling. She states she uses a cane intermittently but there are times, specifically in her apartment where no AD is needed.   Today, patient was alert, oriented x4, cognitively intact, and following 100% of commands. She required supervision for bed mobility and CGA for OOB transfers. Patient was independent with UB ADLs and required CGA for LB ADLs. Patient was educated on signs and symptoms of stroke, BEFAST, stroke risk factors, adaptive ADL techniques, and safe transfer techniques. Patient reports her neighbors check in on her daily and were present for tx today. Her family does not return from [facility] for another week. Patient would benefit from continued skilled OT to progress towards goals and improve overall independence. Patient will benefit from skilled intervention to address the above impairments. Patient?s rehabilitation potential is considered to be Good  Factors which may influence rehabilitation potential include:   ? None noted  ? Mental ability/status  ? Medical condition  ? Home/family situation and support systems  ? Safety awareness  ? Pain tolerance/management  ? Other:      PLAN :  Recommendations and Planned Interventions:  ?               Self Care Training                  ? Therapeutic Activities  ? Functional Mobility Training    ? Cognitive Retraining  ? Therapeutic Exercises           ? Endurance Activities  ? Balance Training                   ? Neuromuscular Re-Education  ? Visual/Perceptual Training     ? Home Safety Training  ? Patient Education                 ? Family Training/Education  ? Other (comment):    Frequency/Duration: Patient will be followed by occupational therapy 5 times a week to address goals. Discharge Recommendations: Home Health vs. SNF ([id number] [facility]) pending progress;  Patient reports her preference is to return home with [facility] at discharge  Further Equipment Recommendations for Discharge: none at this time       SUBJECTIVE:   Patient stated ? I graduated from all of my therapy before. ?    OBJECTIVE DATA SUMMARY:   HISTORY:   Past Medical History:   Diagnosis Date    Cerebral amyloid angiopathy ([facility] [address]     CVA (cerebral vascular accident) ([facility] [address] 03/2019    aphasia and right sided weakness, s/p IV TPA with subsequent SAH/IPH, found to have change c/w CAA    Hypothyroid      Past Surgical History:   Procedure Laterality Date    HX BACK SURGERY      HX CORNEAL TRANSPLANT      HX HIP REPLACEMENT      right    HX HYSTERECTOMY      HX TONSILLECTOMY         Prior Level of Function/Environment/Context: Patient lives alone at [facility] living. See assessment section for PLOF information. Home Situation  Home Environment: [place] [address] Name: [facility]  One/Two Story Residence: One story  Living Alone: Yes  Support Systems: Child(anya), Family member(s), Friends \ neighbors  Patient Expects to be Discharged to[de-identified] Private residence  Current DME Used/Available at Home: [place], straight, Grab bars, Shower chair  Tub or Shower Type: Shower    Hand dominance: Right    EXAMINATION OF PERFORMANCE DEFICITS:  Cognitive/Behavioral Status:  Neurologic State: Alert  Orientation Level: Oriented X4  Cognition: Appropriate decision making; Appropriate for age attention/concentration; Appropriate safety awareness; Follows commands  Perception: Appears intact  Perseveration: No perseveration noted  Safety/Judgement: Awareness of environment    Skin: Intact in the uppers    Edema: None noted in the uppers    Hearing: Auditory  Auditory Impairment: Hard of hearing, bilateral  Hearing Aids/Status: With patient    Vision/Perceptual:    Tracking: Able to track stimulus in all quadrants w/o difficulty    Visual Fields: Difficulty detecting stimulus in right upper quadrant; Difficulty detecting stimulus in right lower quadrant; Difficulty detecting stimulus  in right lateral quadrant(minimal vision R eye x40 years)  Diplopia: No Acuity: Able to read clock/calendar on wall without difficulty; Able to read employee name badge without difficulty(minimal vision R eye x40 years)       Range of Motion:  WDL in the uppers    Strength:  Decreased but functional in the uppers     Coordination:  Fine Motor Skills-Upper: Left Intact; Right Intact    Gross Motor Skills-Upper: Left Intact; Right Intact    Tone & Sensation:  Tone: normal  Sensation: intact    Balance:  Sitting: Intact  Standing: Impaired  Standing - Static: Good  Standing - Dynamic : Fair    Functional Mobility and Transfers for ADLs:  Bed Mobility:  Rolling: Supervision;Assist x1;Additional time  Supine to Sit: Supervision;Assist x1;Additional time  Sit to Supine: (pt remained up at end of tx)  Scooting: Supervision; Additional time;Assist x1    Transfers:  Sit to Stand: Contact guard assistance;Assist x1  Stand to Sit: Contact guard assistance;Assist x1  Bed to Chair: Contact guard assistance;Assist x1  Bathroom Mobility: Contact guard assistance  Toilet Transfer : Contact guard assistance    ADL Assessment:  Feeding: Independent    Oral Facial Hygiene/Grooming: Independent    Bathing: Contact guard assistance    Upper Body Dressing: Independent    Lower Body Dressing: Contact guard assistance    Toileting: Contact guard assistance    Cognitive Retraining  Safety/Judgement: Awareness of environment    Functional Measure:  Fugl-Campos Assessment of Motor Recovery after Stroke:     Reflex Activity  Flexors/Biceps/Fingers: Can be elicited  Extensors/Triceps: Can be elicited  Reflex Subtotal: 4    Volitional Movement Within Synergies  Shoulder Retraction: Full  Shoulder Elevation: Full  Shoulder Abduction (90 degrees): Full  Shoulder External Rotation: Full  Elbow Flexion: Full  Forearm Supination: Full  Shoulder Adduction/Internal Rotation: Full  Elbow Extension: Full  Forearm Pronation: Full  Subtotal: 18    Volitional Movement Mixing Synergies  Hand to Lumbar Spine: Full  Shoulder Flexion (0-90 degrees): Full  Pronation-Supination: Full  Subtotal: 6    Volitional Movement With Little or No Synergy  Shoulder Abduction (0-90 degrees): Full  Shoulder Flexion ( degrees): Full  Pronation/Supination: Full  Subtotal : 6    Normal Reflex Activity  Biceps, Triceps, Finger Flexors: Full  Subtotal : 2    Upper Extremity Total   Upper Extremity Total: 36    Wrist  Stability at 15 Degree Dorsiflexion: Full  Repeated Dorsiflexion/ Volar Flexion: Full  Stability at 15 Degree Dorsiflexion: Full  Repeated Dorsiflexion/ Volar Flexion: Full  Circumduction: Full  Wrist Total: 10    Hand  Mass Flexion: Full  Mass Extension: Full  Grasp A: Full  Grasp B: Full  Grasp C: Full  Grasp D: Full  Grasp E: Full  Hand Total: 14    Coordination/Speed  Tremor: None  Dysmetria: Slight  Time: <1s  Coordination/Speed Total : 5    Total A-D  Total A-D (Motor Function): 65/66         This is a reliable/valid measure of arm function after a neurological event. It has established value to characterize functional status and for measuring spontaneous and therapy-induced recovery; tests proximal and distal motor functions. Fugl-Campos Assessment - UE scores recorded between five and 30 days post neurologic event can be used to predict UE recovery at six months post neurologic event. Severe = 0-21 points   Moderately Severe = 22-33 points   Moderate = 34-47 points   Mild = 48-66 points  SHAMAR Wallace, DAVID Gomez, & DALLAS Dickerson (1992). Measurement of motor recovery after stroke: Outcome assessment and sample size requirements.  Stroke, 23, pp. 1906-2600.   --------------------------------------------------------------------------------------------------------------------------------------------------------------------  MCID:  Stroke:   Maurisio Hayden et al, 2001; n = 171; mean age 79 (6) years; assessed within 16 (12) days of stroke, Acute Stroke)  FMA Motor Scores from Admission to Discharge   10 point increase in FMA Upper Extremity = 1.5 change in discharge FIM   10 point increase in FMA Lower Extremity = 1.9 change in discharge FIM  MDC:   Stroke:   Jimi Whittington et al, 2008, n = 14, mean age = 59.9 (14.6) years, assessed on average 14 (6.5) months post stroke, Chronic Stroke)   FMA = 5.2 points for the Upper Extremity portion of the assessment        Occupational Therapy Evaluation Charge Determination   History Examination Decision-Making   LOW Complexity : Brief history review  LOW Complexity : 1-3 performance deficits relating to physical, cognitive , or psychosocial skils that result in activity limitations and / or participation restrictions  LOW Complexity : No comorbidities that affect functional and no verbal or physical assistance needed to complete eval tasks       Based on the above components, the patient evaluation is determined to be of the following complexity level: LOW   Pain:  Pain Scale 1: Numeric (0 - 10)  Pain Intensity 1: 0              Activity Tolerance:   Patient tolerated eval well. After treatment:   ? Patient left in no apparent distress sitting up in chair  ? Patient left in no apparent distress in bed  ? Call bell left within reach  ? Nursing notified  ? Caregiver present-neighbors  ? Bed alarm activated    COMMUNICATION/EDUCATION:   The patient?s plan of care was discussed with: Registered Nurse and patient . ? Home safety education was provided and the patient/caregiver indicated understanding. ? Patient/family have participated as able in goal setting and plan of care. ? Patient/family agree to work toward stated goals and plan of care. ? Patient understands intent and goals of therapy, but is neutral about his/her participation. ? Patient is unable to participate in goal setting and plan of care. This patient?s plan of care is appropriate for delegation to [facility].     Thank you for this referral.  Marietta Levin OTR/L  Time Calculation: 40 mins

## 2019-07-09 NOTE — H&P
[address]  [address] [facility], [facility]  [phone]    Hospitalist Admission Note      NAME:  Jennifer Andres   :   7/3/1928   MRN:  [id number]     PCP:  Berna Lopes MD     Date/Time:  2019 11:04 PM         Assessment / Plan:         Expressive aphasia: transient, resolved, concerning for TIA (transient ischemic attack). Admitted ~ 3-4 months ago for CVA (cerebral vascular accident) s/p tPA, resulting in [address] (intracerebral hemorrhage). MRI then showed cerebral amyloid angiopathy. Given as such, will hold all antithrombotics including antiplatelets and DVT chemoprophylaxis, pending neurology evaluation and input. Will check MRI and MRA, carotid dopplers. Her prior CTA did show mild stenosis at the origin of the right vertebral artery, and beaded appearance of the bilateral cervical internal carotid arteries and the right V3 segment, favored to represent fibromuscular dysplasia. Check UA. PT/OT, speech eval if needed      Acquired hypothyroidism: check TSH. On low dose Synthroid      Headache: mild, better. Frequent neuro checks      Cervical stenosis of spine: noted on recent CT. Does not appear symptomatic       Right eye blindness: hx of trauma. Supportive care    Code Status: DNR. Pt made it very clear to me that she would not want heroic measures for resuscitation in the event of cardiopulmonary arrest, including chest compressions, defibrillation, intubation/mechanical ventilation.  She has an DNAR scanned from March       Surrogate decision maker: child      Previous notes and lab results reviewed, including prior admission note, DC summary      Total time spent with patient: 79 Minutes   Time spent in the care of this patient included reviewing records, discussing with nursing, obtaining history and examining the patient, and discussing treatment plans, with >50% time spent counseling/coordinating care    Risk of deterioration: High Care Plan discussed with: ED provider, Patient, Nursing Staff and >50% of time spent in counseling and coordination of care    Discussed:  Code Status, Care Plan and D/C Planning    Prophylaxis:  SCD's    Disposition:   PT, OT, RN                 Subjective:     CHIEF COMPLAINT: expressive aphasia    HISTORY OF PRESENT ILLNESS:     Ms. Brandee Artis is a 80 y.o. female w/ hx of ?CVA, ? cerebral amyloid angiopathy who presents with expressive aphasia. Started about 4:45PM. Had trouble with word-finding and getting words out. Transient, apparently resolved in ED. On my evaluation normal speech. Noted vague head fullness/headache, also better. No slurred speech, facial droop, weakness, or numbness. No visual changes. ED workup was unremarkable. Ms. Brandee Artis is admitted for further evaluation and management. Past Medical History:   Diagnosis Date    Cerebral amyloid angiopathy (HCC)     CVA (cerebral vascular accident) ([facility] [address] 03/2019    aphasia and right sided weakness, s/p IV TPA with subsequent SAH/IPH, found to have change c/w CAA    Hypothyroid         Past Surgical History:   Procedure Laterality Date    HX BACK SURGERY      HX CORNEAL TRANSPLANT      HX HIP REPLACEMENT      right    HX HYSTERECTOMY      HX TONSILLECTOMY         Social History     Tobacco Use    Smoking status: Never Smoker    Smokeless tobacco: Never Used   Substance Use Topics    Alcohol use: Yes     Alcohol/week: 0.5 oz     Types: 1 Glasses of wine per week        Family History   Problem Relation Age of Onset    Dementia Mother         Dec 94yo, had memory loss for the last few years. No Known Allergies     Prior to Admission medications    Medication Sig Start Date End Date Taking? Authorizing Provider   tolterodine ER (DETROL-LA) 2 mg ER capsule Take 2 mg by mouth nightly.    Yes Provider, Historical   levothyroxine (SYNTHROID) 25 mcg tablet TAKE 1 TABLET IN THE MORNING BEFORE BREAKFAST 5/14/14  Yes Alana Nicole MD   cycloSPORINE (RESTASIS) 0.05 % ophthalmic emulsion Administer 1 Drop to both eyes two (2) times a day. Yes Provider, Historical   brinzolamide (AZOPT) 1 % ophthalmic suspension Administer 1 Drop to right eye two (2) times a day. Yes Provider, Historical   brimonidine-timolol (COMBIGAN) 0.2-0.5 % Drop ophthalmic solution Administer 1 Drop to right eye every twelve (12) hours. Yes Provider, Historical       Review of Systems:  (bold if positive, if negative)    Gen:  Eyes:  ENT:  CVS:  Pulm:  GI:    :    MS:  Skin:  Psych:  Endo:    Hem:  Renal:    Neuro:  headache          Objective:      VITALS:    Vital signs reviewed; most recent are:    Visit Vitals  /61 (BP 1 Location: Right arm, BP Patient Position: At rest)   Pulse 78   Temp 98.2 °F (36.8 °C)   Resp 15   Ht 5' 1\" (1.549 m)   Wt 48.3 kg (106 lb 6.4 oz)   SpO2 98%   BMI 20.10 kg/m²     SpO2 Readings from Last 6 Encounters:   07/08/19 98%   06/02/19 94%   04/22/19 98%   03/21/19 98%   03/18/19 98%   01/09/14 96%        No intake or output data in the 24 hours ending 07/08/19 2304         Exam:     Physical Exam:    Gen:  Thin, well-developed, well-nourished, in no acute distress. Pleasant   HEENT:  No scleral icterus, R eye blind. L pupil 3mm, reactive, hearing intact to voice, moist mucous membranes  Neck:  Supple, without masses. Thyroid non-tender  Resp:  No accessory muscle use. CTAB without wheezing, rales, rhonchi  Card: RRR. Normal S1 and S2 without murmurs, rubs, or gallops. No peripheral lower extremity edema. No JVD. Peripheral pulses in tact. Abd:  Normoactive bowel sounds. Soft, non-tender, non-distended. No rebound, no guarding. No appreciable hepatosplenomegaly   Lymph:  No cervical adenopathy  Musc:  No cyanosis or clubbing  Skin:  No rashes or ulcers; turgor intact.  Cap refill ~2 secs  Neuro:  Cranial nerves 3-12 intact, no focal motor weakness showing 5/5 strength BUE and BLEs, follows commands appropriately  Psych:  Good insight, normal affect. Alert, oriented x 3. Answers questions appropriately       Labs:    Recent Labs     07/08/19 2127   WBC 6.7   HGB 13.9   HCT 40.5        Recent Labs     07/08/19 2127   *   K 3.5      CO2 26   GLU 94   BUN 15   CREA 0.75   CA 8.4*   ALB 3.6   SGOT 21   ALT 21     No components found for: GLPOC  No results for input(s): PH, PCO2, PO2, HCO3, FIO2 in the last 72 hours. Recent Labs     07/08/19 2127   INR 1.0     No results found for: SDES  No results found for: CULT  All other current labs reviewed in the computer. Imaging/Studies:    CT head: No acute intracranial hemorrhage or infarct.      Tele: sinus rhythm    ___________________________________________________    Attending Physician: Tia Mcelroy MD

## 2019-07-09 NOTE — PROGRESS NOTES
Bedside shift change report given to [facility] (oncoming nurse) by Radha Morris (offgoing nurse). Report included the following information SBAR, Kardex, ED Summary, Intake/Output, MAR, Recent Results and Med Rec Status. 1230 Pt was taken to MRI. 1315 Pt returned from MRI. 1520 Pt was taken to Vascular studies. 65 Pt returned from Vascular studies. Bedside shift change report given to Bony Juan (oncoming nurse) by [facility] (offgoing nurse). Report included the following information SBAR, Kardex, Intake/Output, MAR, Recent Results and Cardiac Rhythm NSR.

## 2019-07-09 NOTE — PROGRESS NOTES
Problem: Mobility Impaired (Adult and Pediatric)  Goal: *Acute Goals and Plan of Care (Insert Text)  Description  Physical Therapy Goals  Initiated 7/9/2019  1. Patient will move from supine to sit and sit to supine , scoot up and down and roll side to side in bed with modified independence within 7 day(s). 2.  Patient will transfer from bed to chair and chair to bed with modified independence using the least restrictive device within 7 day(s). 3.  Patient will perform sit to stand with modified independence within 7 day(s). 4.  Patient will ambulate with modified independence for 250 feet with the least restrictive device within 7 day(s). Note:   PHYSICAL THERAPY EVALUATION  Patient: Luann Chowdary (47 y.o. female)  Date: 7/9/2019  Primary Diagnosis: TIA (transient ischemic attack) [G45.9]        Precautions: Fall    ASSESSMENT :  Numerous attempts to evaluate today however patient off unit for test or with another discipline. Patient just returning from doppler study upon arrival of PT again this pm.  Based on the objective data described below, the patient presents with admission on 7/8 for TIA expressive aphasia, R/O CVA and currently undergoing neuro work-up with CT and MRI and both negative for acute process. MRI did reveal \"Scattered foci of susceptibility artifact in both cerebral hemispheres in the right cerebellum due to remote hemorrhage. \"  Patient has a history significant for cerebral amyloid angiopathy and recent CVA (March 2019), ICH following tPA (L occipital). She went to [facility]/inpatient rehab following stroke, discharged back to independent living, and was managing care independently prior to this admission. She just traveled internationally to [facility] for family reunion, returned home the day prior to admit, and reports being generally fatigued from traveling. She states she uses a cane intermittently but there are times, specifically in her apartment where no AD is needed.    Today, patient appeared extremely fatigued but moving and responding well for 80years of age. Patient was alert, oriented x4, cognitively intact, and following 100% of commands but she did require repetition for recall of information already discussed within the session. Overall patient required CG to SBA for mobility and denied abnormal signs and symptoms to activity. She was educated on signs and symptoms of stroke with GREGORIA acronym written out on her board. We talked about the lifestyle changes she has undergone since recent ICH/CVA. Patient with incorrect recall of information related to CVA/TIA \" I was told once you have one its highly unlikely it will happen again. \" Patient reports she alerts [employer] system as to when she is awake and has call service to use as needed. She states she has some neighbors that will check on her. She states she has had NUMEROUS falls and has been advised to use a RW but she usually [facility] or [facility] for balance. At 79 yo patient appears quite fatigued from recent international trip/family reunion to [place]. Her family does not return for another week. Given all of the recent medical history, including frequent fall history and ICH, as well as a  glimpse of patient's short term memory deficits detected within short PT evaluation during gait and the ADL of toileting, recommend HHPT follow up and prefer patient return to independent living with 1:1 daytime support arranged at least until her family returns to [place]. If this is unable to be arranged, recommend healthcare at [facility] at least until family returns. Patient would benefit from continued skilled PT to progress towards goals and improve overall independence. Relayed PT recommendation of HHPT follow up to . Patient will benefit from skilled intervention to address the above impairments.   Patient?s rehabilitation potential is considered to be Good  Factors which may influence rehabilitation potential include:   ? None noted  ? Mental ability/status  ? Medical condition  ? Home/family situation and support systems  ? Safety awareness  ? Pain tolerance/management  ? Other:      PLAN :  Recommendations and Planned Interventions:  ?           Bed Mobility Training             ? Neuromuscular Re-Education  ? Transfer Training                   ? Orthotic/Prosthetic Training  ? Gait Training                         ? Modalities  ? Therapeutic Exercises           ? Edema Management/Control  ? Therapeutic Activities            ? Patient and Family Training/Education  ? Other (comment):    Frequency/Duration: Patient will be followed by physical therapy  daily to address goals. Discharge Recommendations: Home Health and friends and other family for 1:1 daytime supervision until other family returns to [address] [place] [address] Recommendations for Discharge: none new- has RW and SPC     SUBJECTIVE:   Patient stated ? I hope I don't have to have too many more tests because I am exhausted and I can't seem to get rest.?    OBJECTIVE DATA SUMMARY:   HISTORY:    Past Medical History:   Diagnosis Date    Cerebral amyloid angiopathy ([facility] [address]     CVA (cerebral vascular accident) ([facility] [address] 03/2019    aphasia and right sided weakness, s/p IV TPA with subsequent SAH/IPH, found to have change c/w CAA    Hypothyroid      Past Surgical History:   Procedure Laterality Date    HX BACK SURGERY      HX CORNEAL TRANSPLANT      HX HIP REPLACEMENT      right    HX HYSTERECTOMY      HX TONSILLECTOMY       Prior Level of Function/Home Situation:   Personal factors and/or comorbidities impacting plan of care:     Home Situation  Home Environment: [place] [address] Name: Palmer Valdes  One/Two Story Residence: One story  Living Alone: Yes  Support Systems: Child(anya), Family member(s), Friends \ neighbors  Patient Expects to be Discharged to[de-identified] Private residence  Current DME Used/Available at Home: Annice Mohs, straight, Grab bars, Shower chair  Tub or Shower Type: Shower    EXAMINATION/PRESENTATION/DECISION MAKING:   Critical Behavior:  Neurologic State: Alert, Eyes open spontaneously, Appropriate for age  Orientation Level: Oriented X4  Cognition: Appropriate decision making, Appropriate for age attention/concentration, Appropriate safety awareness  Safety/Judgement: Awareness of environment  Hearing: Auditory  Auditory Impairment: Hard of hearing, bilateral  Hearing Aids/Status: With patient    Range Of Motion:  AROM: Within functional limits           PROM: Within functional limits           Strength:    Strength: Within functional limits                    Tone & Sensation:   Tone: Normal              Sensation: Intact               Coordination:  Coordination: Within functional limits  Vision:   Tracking: Able to track stimulus in all quadrants w/o difficulty  Visual Fields: Difficulty detecting stimulus in right upper quadrant; Difficulty detecting stimulus in right lower quadrant; Difficulty detecting stimulus  in right lateral quadrant(minimal vision R eye x40 years)  Diplopia: No  Acuity: Able to read clock/calendar on wall without difficulty; Able to read employee name badge without difficulty(minimal vision R eye x40 years)  Functional Mobility:  Bed Mobility:  Rolling: Supervision;Assist x1;Additional time  Supine to Sit: Supervision;Assist x1;Additional time  Sit to Supine: (pt remained up at end of tx)  Scooting: Supervision; Additional time;Assist x1  Transfers:  Sit to Stand: Contact guard assistance;Assist x1  Stand to Sit: Contact guard assistance;Assist x1  Stand Pivot Transfers: Contact guard assistance     Bed to Chair: Contact guard assistance;Assist x1              Balance:   Sitting: Intact  Standing: Impaired  Standing - Static: Good  Standing - Dynamic : Fair  Ambulation/Gait Training:  Distance (ft): 200 Feet (ft)  Assistive Device: Gait belt  Ambulation - Level of Assistance: Stand-by assistance        Gait Abnormalities: Path deviations(mild)        Base of Support: Narrowed       Stairs - Level of Assistance: (NT)    Functional Measure:  Barthel Index:    Bathin  Bladder: 10  Bowels: 10  Groomin  Dressing: 10  Feeding: 10  Mobility: 10  Stairs: 0  Toilet Use: 10  Transfer (Bed to Chair and Back): 15  Total: 85/100       Percentage of impairment   0%   1-19%   20-39%   40-59%   60-79%   80-99%   100%   Barthel Score 0-100 100 99-80 79-60 59-40 20-39 1-19   0     The Barthel ADL Index: Guidelines  1. The index should be used as a record of what a patient does, not as a record of what a patient could do. 2. The main aim is to establish degree of independence from any help, physical or verbal, however minor and for whatever reason. 3. The need for supervision renders the patient not independent. 4. A patient's performance should be established using the best available evidence. Asking the patient, friends/relatives and nurses are the usual sources, but direct observation and common sense are also important. However direct testing is not needed. 5. Usually the patient's performance over the preceding 24-48 hours is important, but occasionally longer periods will be relevant. 6. Middle categories imply that the patient supplies over 50 per cent of the effort. 7. Use of aids to be independent is allowed. Debi Hills., Barthel, D.W. (0313). Functional evaluation: the Barthel Index. [address] [facility] (14)2. GREGORIO Guo, Camille Fofana., Johnson Whittington., Virginia Bates, [address] [place] [address] (). Measuring the change indisability after inpatient rehabilitation; comparison of the responsiveness of the Barthel Index and Functional [place] Measure. Journal of Neurology, Neurosurgery, and Psychiatry, 66(4), 329-910.   QUYEN Ashford, GRACE Ballard, Renzo Zhu M.A. (2004.) Assessment of post-stroke quality of life in cost-effectiveness studies: The usefulness of the Barthel Index and the EuroQoL-5D. Quality of Life Research, 15, 512-02           Physical Therapy Evaluation Charge Determination   History Examination Presentation Decision-Making   HIGH Complexity :3+ comorbidities / personal factors will impact the outcome/ POC  MEDIUM Complexity : 3 Standardized tests and measures addressing body structure, function, activity limitation and / or participation in recreation  LOW Complexity : Stable, uncomplicated  Other outcome measures Barthel Index  LOW       Based on the above components, the patient evaluation is determined to be of the following complexity level: LOW     Activity Tolerance:   Good   Please refer to the flowsheet for vital signs taken during this treatment. After treatment:   ?         Patient left in no apparent distress sitting up in chair  ? Patient left in no apparent distress in bed  ? Call bell left within reach  ? Nursing notified  ? Caregiver present  ? Bed alarm activated    COMMUNICATION/EDUCATION:   The patient?s plan of care was discussed with: Registered Nurse. ?         Fall prevention education was provided and the patient/caregiver indicated understanding. ? Patient/family have participated as able in goal setting and plan of care. ?         Patient/family agree to work toward stated goals and plan of care. ?         Patient understands intent and goals of therapy, but is neutral about his/her participation. ? Patient is unable to participate in goal setting and plan of care.     Thank you for this referral.  Viet Lorenzo, PT, DPT   Time Calculation: 33 mins

## 2019-07-09 NOTE — ED TRIAGE NOTES
Pt. David Salvage in by EMS for expressive aphasia that started around [id number]. Has subsided at this time. Pt does take an aspirin. BS 91 per EMS. Also complaining of frontal headache.

## 2019-07-09 NOTE — ED NOTES
Teleneuro on screen at this time. Pt.  was by herself all day, then went to her neighbors and noted her speech difficulty around [id number].  could not get her words out.

## 2019-07-09 NOTE — CONSULTS
[facility]: [id number] [address] Neurology  Eliz Merrill  [phone]      Name:   Nancy March,# [address] record #: [id number]  Admission Date: 7/8/2019     Who Consulted: Dr. Jayson Mcelroy    Reason for Consult:  Expressive Aphasia    HISTORY OF PRESENT ILLNESS:     This is a 80 y.o. female who is admitted for speech difficulty. Ms. Tana Humphreys presented to the ED with  chief  speech difficulty. Per EMS she had difficulty finding the right words to speak for a while. Her admission Bp was 156/61 and her exam was normal. The Neurology Service is asked to evaluate for TIA. She was seen by our service 3/18/19 - 3/21/19 after presenting to [facility] with aphasia, right sided weakness. CT head, CTA H/N - without acute findings, no LVO. S/p IV TPA, noted to have worsening aphasia, repeat head CT with SAH in right frontal and occipital lobe and left occipital IPH. She was not started on  antiplatelet drugs and anticoagulation due to increase risk of cerebral hemorrhage. She was seen in clinic on 4/22/2019 by Dr. Fazal Marie who found that she still had some mild expressive aphasia. 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 Neuro-imaging:     CT Head: No acute intracranial hemorrhage or infarct. MRI Brain:   1. No evidence of acute infarction. 2.  Chronic microvascular ischemic disease. 3.  Scattered foci of susceptibility artifact in both cerebral hemispheres in the right cerebellum due to remote hemorrhage. MRA Brain: Patent intracranial vasculature. No evidence of hemodynamically significant stenosis or large vessel occlusion. Fetal right PCA. EKG: normal sinus rhythm. Care Plan discussed with:  Patient x   Family    RN    Care Manager    Consultant/Specialist:         Thank you for allowing the Neurology Service the pleasure of participating in the care of your patient.   This patient will be discussed with my collaborating care team physician,   Rosie Garcia and he may have further recommendations regarding this patient's care      Radha HUTCHINSONJenifer Jazlyn, ACNP-BC  ====================      Attending Attestation:                            Assessment/Plan:     1. Transient Ischemic Attack, r/o Stroke:    · ASA not indicated due to risk of ICH with CAA  · Neurochecks:  Every 4 hours  · Blood Sugar Goal:  140-180  · BP Goal: Less than 160    Stroke work up  · A1C:  5.7  · LDL:  96.2  · TTE:  3/18/2019:  Estimated left ventricular ejection fraction is 56 - 60%. · MRI:  No acute stroke  · Carotid vascular imaging:  No significant stenosis    Risk factors for stroke include:     · Discussed with patient    · Discussed signs/symptoms of stroke and when to call 911    3. Mobility:   · Has not been OOB. · PT/OT to eval for rehab    4. Diet:    · Does not need SLP     5. VTE Prophylaxes:   · Per Primary team           Review of Systems: 10 point ROS was performed. Pertinent positives listed in HPI. Negative ROS is as follows. Pt denies: angina, palpitations, paresthesias, weakness, vision loss, confusion, fever, chills, diplopia, back pain, neck pain, prior episodes of vertigo, hallucinations, new medications or dosage changes. 4/22/2019 Exam by Dr. Arcelia Clay:  Alert, cooperative, no distress. Head:  Normocephalic, without obvious abnormality, atraumatic. Respiratory:  Heart:   Non labored breathing  Regular rate and rhythm, no murmurs   Neck:       Extremities: Warm, no cyanosis or edema. Pulses: 2+ radial pulses.         Neurologic:  MS: Alert and oriented x 4, speech intact. Language - word finding, able to name, repeat, and follow all commands. Attention and fund of knowledge appropriate. Recent and remote memory intact.   Cranial Nerves:  II: visual fields Full to confrontation   II: pupils Equal, round, reactive to light   II: optic disc     III,VII: ptosis none   III,IV,VI: extraocular muscles  EOMI, no nystagmus or diplopia   V: facial light touch sensation  normal   VII: facial muscle function   symmetric   VIII: hearing intact   IX: soft palate elevation  normal   XI: trapezius strength      XI: sternocleidomastoid strength     XII: tongue  Midline      Motor: normal bulk and tone, no tremor              Strength: 5/5 throughout except 5-/5 right DF, no PD  Sensory:  Coordination: FTN intact  Gait: normal gait, takes extra step to turn. Tandem not attempted. Reflexes: 2+ symmetric        Physical Exam    General:   Alert, cooperative, no acute distress. Frail. Lungs:   Clear to auscultation bilaterally. No crackles/wheezes. Heart:  Abdominal:  Normal rhythm, no carotid bruits, no peripheral edema  Soft and nondistended   NEUROLOGICAL EXAM:     Mental Status: Oriented to time, place and person. Fully attentive. No aphasia. Full fund of knowledge. Normal recent and remote memory. Cranial Nerves:   Visual Fields:  normal in all quadrants in left eye, blind in right. EOM: no nystagmus. Facial movements:  symmetric, no ptosis Facial sensation:  intact to LT on both sides. Hearing:  normal.       Language:  no dysarthria, no aphasia, normal fluency, normal repetition. Tongue: midline. Soft palate: not examined  SCMs: normal, symmetric. Reflexes:   LUExt: 2+/ 4                 RUExt: 2+/ 4  LLExt: 2+/4                  RLExt: 2+/ 4          Sensory:   LT and Temp intact in all extremities            Cerebellar:  No resting, no postural tremors, normal finger nose finger. No pronator drift                            Motor:           LUExt: 5/ 5               RUExt: 5/ 5                                              LLExt: 5/ 5                RLExt: 5/ 5        Gait:   Not tested              Allergies:   No Known Allergies    Outpatient Meds  No current facility-administered medications on file prior to encounter.       Current Outpatient Medications on File Prior to Encounter   Medication Sig Dispense Refill    tolterodine ER ([facility]) 2 mg ER capsule Take 2 mg by mouth nightly.  levothyroxine (SYNTHROID) 25 mcg tablet TAKE 1 TABLET IN THE MORNING BEFORE BREAKFAST 60 Tab 6    cycloSPORINE (RESTASIS) 0.05 % ophthalmic emulsion Administer 1 Drop to both eyes two (2) times a day.  brinzolamide (AZOPT) 1 % ophthalmic suspension Administer 1 Drop to right eye two (2) times a day.  brimonidine-timolol (COMBIGAN) 0.2-0.5 % Drop ophthalmic solution Administer 1 Drop to right eye every twelve (12) hours.          Inpatient Meds    Current Facility-Administered Medications   Medication Dose Route Frequency Provider Last Rate Last Dose    cycloSPORINE (RESTASIS) 0.05 % ophthalmic emulsion 1 Drop  1 Drop Both Eyes BID Mil Edouard MD   1 Drop at 07/09/19 1004    levothyroxine (SYNTHROID) tablet 25 mcg  25 mcg Oral Catrachito Griffin MD   25 mcg at 07/09/19 0536    brimonidine (ALPHAGAN) 0.2 % ophthalmic solution 1 Drop  1 Drop Right Eye Q12H Mil Edouard MD   1 Drop at 07/09/19 1004    And    timolol (TIMOPTIC) 0.5 % ophthalmic solution 1 Drop  1 Drop Right Eye Q12H Mil Edouard MD   1 Drop at 07/09/19 1004    dorzolamide (TRUSOPT) 2 % ophthalmic solution 1 Drop  1 Drop Right Eye BID Mil Edouard MD   1 Drop at 07/09/19 1004    sodium chloride (NS) flush 5-40 mL  5-40 mL IntraVENous Q8H Mil Edouard MD        sodium chloride (NS) flush 5-40 mL  5-40 mL IntraVENous PRN Mil Edouard MD        acetaminophen (TYLENOL) tablet 650 mg  650 mg Oral Q4H PRN Mil Edouard MD        Or   [employer] acetaminophen (TYLENOL) solution 650 mg  650 mg Per NG tube Q4H PRN Mil Edouard MD        Or   [employer] acetaminophen (TYLENOL) suppository 650 mg  650 mg Rectal Q4H PRN Mil Edouard MD        sodium chloride (NS) flush 5-40 mL  5-40 mL IntraVENous Nick Meehan MD   10 mL at 07/09/19 0536    sodium chloride (NS) flush 5-40 mL  5-40 mL IntraVENous PRN Mil Edouard MD        acetaminophen (TYLENOL) tablet 650 mg  650 mg Oral Q6H PRN Mil Edouard MD  naloxone (NARCAN) injection 0.4 mg  0.4 mg IntraVENous PRN Todd Casiano MD               Past Medical History:   Diagnosis Date    [facility]     CVA (cerebral vascular accident) ([facility] 03/2019    aphasia and right sided weakness, s/p IV TPA with subsequent SAH/IPH, found to have change c/w CAA    Hypothyroid        Past Surgical History:   Procedure Laterality Date    HX BACK SURGERY      HX CORNEAL TRANSPLANT      HX HIP REPLACEMENT      right    HX HYSTERECTOMY      HX TONSILLECTOMY         family history includes Dementia in her mother. reports that she has never smoked. She has never used smokeless tobacco. She reports that she drinks about 0.5 oz of alcohol per week. She reports that she does not use drugs. Lab Results (last 24 hrs)  Recent Results (from the past 24 hour(s))   SAMPLES BEING HELD    Collection Time: 07/08/19  9:27 PM   Result Value Ref Range    SAMPLES BEING HELD RED,GOLD,GRN     COMMENT        Add-on orders for these samples will be processed based on acceptable specimen integrity and analyte stability, which may vary by analyte. CBC WITH AUTOMATED DIFF    Collection Time: 07/08/19  9:27 PM   Result Value Ref Range    WBC 6.7 3.6 - 11.0 K/uL    RBC 4.18 3.80 - 5.20 M/uL    HGB 13.9 11.5 - 16.0 g/dL    HCT 40.5 35.0 - 47.0 %    MCV 96.9 80.0 - 99.0 FL    MCH 33.3 26.0 - 34.0 PG    MCHC 34.3 30.0 - 36.5 g/dL    RDW 11.9 11.5 - 14.5 %    PLATELET 138 164 - 334 K/uL    MPV 8.7 (L) 8.9 - 12.9 FL    NRBC 0.0 0  WBC    ABSOLUTE NRBC 0.00 0.00 - 0.01 K/uL    NEUTROPHILS 58 32 - 75 %    LYMPHOCYTES 29 12 - 49 %    MONOCYTES 9 5 - 13 %    EOSINOPHILS 3 0 - 7 %    BASOPHILS 1 0 - 1 %    IMMATURE GRANULOCYTES 0 0.0 - 0.5 %    ABS. NEUTROPHILS 3.9 1.8 - 8.0 K/UL    ABS. LYMPHOCYTES 1.9 0.8 - 3.5 K/UL    ABS. MONOCYTES 0.6 0.0 - 1.0 K/UL    ABS. EOSINOPHILS 0.2 0.0 - 0.4 K/UL    ABS. BASOPHILS 0.1 0.0 - 0.1 K/UL    ABS. IMM.  GRANS. 0.0 0.00 - 0.04 K/UL    DF AUTOMATED     METABOLIC PANEL, COMPREHENSIVE    Collection Time: 07/08/19  9:27 PM   Result Value Ref Range    Sodium 133 (L) 136 - 145 mmol/L    Potassium 3.5 3.5 - 5.1 mmol/L    Chloride 101 97 - 108 mmol/L    CO2 26 21 - 32 mmol/L    Anion gap 6 5 - 15 mmol/L    Glucose 94 65 - 100 mg/dL    BUN 15 6 - 20 MG/DL    Creatinine 0.75 0.55 - 1.02 MG/DL    BUN/Creatinine ratio 20 12 - 20      GFR est AA >60 >60 ml/min/1.73m2    GFR est non-AA >60 >60 ml/min/1.73m2    Calcium 8.4 (L) 8.5 - 10.1 MG/DL    Bilirubin, total 0.4 0.2 - 1.0 MG/DL    ALT (SGPT) 21 12 - 78 U/L    AST (SGOT) 21 15 - 37 U/L    Alk. phosphatase 76 45 - 117 U/L    Protein, total 7.4 6.4 - 8.2 g/dL    Albumin 3.6 3.5 - 5.0 g/dL    Globulin 3.8 2.0 - 4.0 g/dL    A-G Ratio 0.9 (L) 1.1 - 2.2     TROPONIN I    Collection Time: 07/08/19  9:27 PM   Result Value Ref Range    Troponin-I, Qt. <0.05 <0.05 ng/mL   PTT    Collection Time: 07/08/19  9:27 PM   Result Value Ref Range    aPTT 25.9 22.1 - 32.0 sec    aPTT, therapeutic range     58.0 - 77.0 SECS   PROTHROMBIN TIME + INR    Collection Time: 07/08/19  9:27 PM   Result Value Ref Range    INR 1.0 0.9 - 1.1      Prothrombin time 9.9 9.0 - 11.1 sec   URINALYSIS W/MICROSCOPIC    Collection Time: 07/08/19 10:25 PM   Result Value Ref Range    Color YELLOW/STRAW      Appearance CLEAR CLEAR      Specific gravity 1.005 1.003 - 1.030      pH (UA) 6.5 5.0 - 8.0      Protein NEGATIVE  NEG mg/dL    Glucose NEGATIVE  NEG mg/dL    Ketone NEGATIVE  NEG mg/dL    Bilirubin NEGATIVE  NEG      Blood NEGATIVE  NEG      Urobilinogen 0.2 0.2 - 1.0 EU/dL    Nitrites NEGATIVE  NEG      Leukocyte Esterase SMALL (A) NEG      WBC 0-4 0 - 4 /hpf    RBC 0-5 0 - 5 /hpf    Epithelial cells FEW FEW /lpf    Bacteria NEGATIVE  NEG /hpf   URINE CULTURE HOLD SAMPLE    Collection Time: 07/08/19 10:25 PM   Result Value Ref Range    Urine culture hold        URINE ON HOLD IN MICROBIOLOGY DEPT FOR 3 DAYS.  IF UNPRESERVED URINE IS SUBMITTED, IT CANNOT BE USED FOR ADDITIONAL TESTING AFTER 24 HRS, RECOLLECTION WILL BE REQUIRED. LIPID PANEL    Collection Time: 07/09/19  4:01 AM   Result Value Ref Range    LIPID PROFILE          Cholesterol, total 163 <200 MG/DL    Triglyceride 104 <150 MG/DL    HDL Cholesterol 46 MG/DL    LDL, calculated 96.2 0 - 100 MG/DL    VLDL, calculated 20.8 MG/DL    CHOL/HDL Ratio 3.5 0.0 - 5.0     HEMOGLOBIN A1C WITH EAG    Collection Time: 07/09/19  4:01 AM   Result Value Ref Range    Hemoglobin A1c 5.7 4.2 - 6.3 %    Est. average glucose 258 mg/dL   METABOLIC PANEL, COMPREHENSIVE    Collection Time: 07/09/19  4:01 AM   Result Value Ref Range    Sodium 138 136 - 145 mmol/L    Potassium 3.8 3.5 - 5.1 mmol/L    Chloride 105 97 - 108 mmol/L    CO2 27 21 - 32 mmol/L    Anion gap 6 5 - 15 mmol/L    Glucose 98 65 - 100 mg/dL    BUN 10 6 - 20 MG/DL    Creatinine 0.66 0.55 - 1.02 MG/DL    BUN/Creatinine ratio 15 12 - 20      GFR est AA >60 >60 ml/min/1.73m2    GFR est non-AA >60 >60 ml/min/1.73m2    Calcium 8.3 (L) 8.5 - 10.1 MG/DL    Bilirubin, total 0.3 0.2 - 1.0 MG/DL    ALT (SGPT) 15 12 - 78 U/L    AST (SGOT) 14 (L) 15 - 37 U/L    Alk. phosphatase 62 45 - 117 U/L    Protein, total 6.4 6.4 - 8.2 g/dL    Albumin 3.4 (L) 3.5 - 5.0 g/dL    Globulin 3.0 2.0 - 4.0 g/dL    A-G Ratio 1.1 1.1 - 2.2     CBC WITH AUTOMATED DIFF    Collection Time: 07/09/19  4:01 AM   Result Value Ref Range    WBC 5.0 3.6 - 11.0 K/uL    RBC 4.10 3.80 - 5.20 M/uL    HGB 13.0 11.5 - 16.0 g/dL    HCT 38.2 35.0 - 47.0 %    MCV 93.2 80.0 - 99.0 FL    MCH 31.7 26.0 - 34.0 PG    MCHC 34.0 30.0 - 36.5 g/dL    RDW 11.7 11.5 - 14.5 %    PLATELET 090 614 - 876 K/uL    MPV 8.4 (L) 8.9 - 12.9 FL    NRBC 0.0 0  WBC    ABSOLUTE NRBC 0.00 0.00 - 0.01 K/uL    NEUTROPHILS 54 32 - 75 %    LYMPHOCYTES 31 12 - 49 %    MONOCYTES 11 5 - 13 %    EOSINOPHILS 3 0 - 7 %    BASOPHILS 1 0 - 1 %    IMMATURE GRANULOCYTES 0 0.0 - 0.5 %    ABS.  NEUTROPHILS 2.7 1.8 - 8.0 K/UL ABS. LYMPHOCYTES 1.5 0.8 - 3.5 K/UL    ABS. MONOCYTES 0.5 0.0 - 1.0 K/UL    ABS. EOSINOPHILS 0.1 0.0 - 0.4 K/UL    ABS. BASOPHILS 0.0 0.0 - 0.1 K/UL    ABS. IMM.  GRANS. 0.0 0.00 - 0.04 K/UL    DF AUTOMATED     MAGNESIUM    Collection Time: 07/09/19  4:01 AM   Result Value Ref Range    Magnesium 2.2 1.6 - 2.4 mg/dL   PHOSPHORUS    Collection Time: 07/09/19  4:01 AM   Result Value Ref Range    Phosphorus 3.4 2.6 - 4.7 MG/DL   TSH 3RD GENERATION    Collection Time: 07/09/19  4:01 AM   Result Value Ref Range    TSH 3.32 0.36 - 3.74 uIU/mL   EKG, 12 LEAD, INITIAL    Collection Time: 07/09/19  5:35 AM   Result Value Ref Range    Ventricular Rate 68 BPM    Atrial Rate 68 BPM    P-R Interval 148 ms    QRS Duration 80 ms    Q-T Interval 424 ms    QTC Calculation (Bezet) 450 ms    Calculated P Axis 74 degrees    Calculated R Axis 39 degrees    Calculated T Axis 69 degrees    Diagnosis       Normal sinus rhythm  Septal infarct (cited on or before 17-MAR-2019)  Abnormal ECG  When compared with ECG of 17-MAR-2019 22:18,  No significant change was found  Confirmed by Taqueria HOUGH, Brenden ([id number]) on 7/9/2019 11:14:08 AM     DUPLEX CAROTID BILATERAL    Collection Time: 07/09/19  4:10 PM   Result Value Ref Range    Right subclavian sys 88.6 cm/s    RIGHT SUBCLAVIAN ARTERY D 0.00 cm/s    Right cca dist sys 69.4 cm/s    Right CCA dist higgins 6.6 cm/s    Right CCA prox sys 86.9 cm/s    Right CCA prox higgins 7.3 cm/s    Right eca sys 98.5 cm/s    RIGHT EXTERNAL CAROTID ARTERY D 0.00 cm/s    Right ICA dist sys 112.4 cm/s    Right ICA dist higgins 18.7 cm/s    Right ICA mid sys 136.7 cm/s    Right ICA mid higgins 23.6 cm/s    Right ICA prox sys 85.3 cm/s    Right ICA prox higgins 16.1 cm/s    Right vertebral sys 58.9 cm/s    RIGHT VERTEBRAL ARTERY D 7.25 cm/s    Right ICA/CCA sys 2.0     Left subclavian sys 194.2 cm/s    LEFT SUBCLAVIAN ARTERY D 0.00 cm/s    Left CCA dist sys 72.1 cm/s    Left CCA dist higgins 5.8 cm/s    Left CCA prox sys 99.1 cm/s Left CCA prox higgins 8.5 cm/s    Left ECA sys 95.3 cm/s    LEFT EXTERNAL CAROTID ARTERY D 0.00 cm/s    Left ICA dist sys 93.0 cm/s    Left ICA dist higgins 15.0 cm/s    Left ICA mid sys 98.5 cm/s    Left ICA mid higgins 19.4 cm/s    Left ICA prox sys 63.3 cm/s    Left ICA prox higgins 11.9 cm/s    Left vertebral sys 55.1 cm/s    LEFT VERTEBRAL ARTERY D 11.13 cm/s    Left ICA/CCA sys 1.37

## 2019-07-09 NOTE — ED NOTES
Pt Throughput: Charge Nurse on [facility]  made aware of patient's bed assignment, room 331. Anali Veras, RN  Emergency Dept Charge RN.

## 2019-07-10 LAB
LEFT CCA DIST DIAS: 5.8 CM/S
LEFT CCA DIST SYS: 72.1 CM/S
LEFT CCA PROX DIAS: 8.5 CM/S
LEFT CCA PROX SYS: 99.1 CM/S
LEFT ECA DIAS: 0 CM/S
LEFT ECA SYS: 95.3 CM/S
LEFT ICA DIST DIAS: 15 CM/S
LEFT ICA DIST SYS: 93 CM/S
LEFT ICA MID DIAS: 19.4 CM/S
LEFT ICA MID SYS: 98.5 CM/S
LEFT ICA PROX DIAS: 11.9 CM/S
LEFT ICA PROX SYS: 63.3 CM/S
LEFT ICA/CCA SYS: 1.37
LEFT SUBCLAVIAN DIAS: 0 CM/S
LEFT SUBCLAVIAN SYS: 194.2 CM/S
LEFT VERTEBRAL DIAS: 11.13 CM/S
LEFT VERTEBRAL SYS: 55.1 CM/S
RIGHT CCA DIST DIAS: 6.6 CM/S
RIGHT CCA DIST SYS: 69.4 CM/S
RIGHT CCA PROX DIAS: 7.3 CM/S
RIGHT CCA PROX SYS: 86.9 CM/S
RIGHT ECA DIAS: 0 CM/S
RIGHT ECA SYS: 98.5 CM/S
RIGHT ICA DIST DIAS: 18.7 CM/S
RIGHT ICA DIST SYS: 112.4 CM/S
RIGHT ICA MID DIAS: 23.6 CM/S
RIGHT ICA MID SYS: 136.7 CM/S
RIGHT ICA PROX DIAS: 16.1 CM/S
RIGHT ICA PROX SYS: 85.3 CM/S
RIGHT ICA/CCA SYS: 2
RIGHT SUBCLAVIAN DIAS: 0 CM/S
RIGHT SUBCLAVIAN SYS: 88.6 CM/S
RIGHT VERTEBRAL DIAS: 7.25 CM/S
RIGHT VERTEBRAL SYS: 58.9 CM/S

## 2019-07-10 PROCEDURE — 74011250637 HC RX REV CODE- 250/637: Performed by: INTERNAL MEDICINE

## 2019-07-10 PROCEDURE — 99218 HC RM OBSERVATION: CPT

## 2019-07-10 RX ORDER — PRAVASTATIN SODIUM 20 MG/1
20 TABLET ORAL
Qty: 30 TAB | Refills: 0 | Status: SHIPPED | OUTPATIENT
Start: 2019-07-10 | End: 2020-06-29

## 2019-07-10 RX ADMIN — DORZOLAMIDE HYDROCHLORIDE 1 DROP: 20 SOLUTION/ DROPS OPHTHALMIC at 08:45

## 2019-07-10 RX ADMIN — Medication 10 ML: at 20:10

## 2019-07-10 RX ADMIN — Medication 10 ML: at 15:28

## 2019-07-10 RX ADMIN — LEVOTHYROXINE SODIUM 25 MCG: 25 TABLET ORAL at 06:20

## 2019-07-10 RX ADMIN — CYCLOSPORINE 1 DROP: 0.5 EMULSION OPHTHALMIC at 18:02

## 2019-07-10 RX ADMIN — TIMOLOL MALEATE 1 DROP: 5 SOLUTION/ DROPS OPHTHALMIC at 08:45

## 2019-07-10 RX ADMIN — BRIMONIDINE TARTRATE 1 DROP: 2 SOLUTION OPHTHALMIC at 20:09

## 2019-07-10 RX ADMIN — DORZOLAMIDE HYDROCHLORIDE 1 DROP: 20 SOLUTION/ DROPS OPHTHALMIC at 18:01

## 2019-07-10 RX ADMIN — Medication 10 ML: at 06:20

## 2019-07-10 RX ADMIN — CYCLOSPORINE 1 DROP: 0.5 EMULSION OPHTHALMIC at 08:45

## 2019-07-10 RX ADMIN — BRIMONIDINE TARTRATE 1 DROP: 2 SOLUTION OPHTHALMIC at 08:45

## 2019-07-10 RX ADMIN — TIMOLOL MALEATE 1 DROP: 5 SOLUTION/ DROPS OPHTHALMIC at 20:09

## 2019-07-10 NOTE — PROGRESS NOTES
1900  Bedside and Verbal shift change report given to [address] [place] [address] (oncoming nurse) by Simone Quezada RN (offgoing nurse). Report included the following information SBAR, Kardex, Procedure Summary, Intake/Output, MAR, Accordion, Recent Results and Cardiac Rhythm NSR. Patient on bed with visitors on the room. No complaints of pain. Initial assessment done. Bed alarm on. Patient aware to call before getting out bed. Call bell within reach. 1915  Radha from Neuro on the floor. Neuro will comeback in the morning to reevaluate if patient is okay to be d/c.     2100   Patient refused the eye drops scheduled at 2100. She said she already took it around 7pm tonight. Visit Vitals  /72 (BP 1 Location: Left arm, BP Patient Position: At rest)   Pulse 67   Temp 97.3 °F (36.3 °C)   Resp 16   Ht 5' 1\" (1.549 m)   Wt 48.3 kg (106 lb 6.4 oz)   SpO2 95%   BMI 20.10 kg/m²     0700  Bedside and Verbal shift change report given to Simone Quezada RN (oncoming nurse) by Gail White RN (offgoing nurse). Report included the following information SBAR, Kardex, Procedure Summary, Intake/Output, MAR, Accordion and Recent Results.

## 2019-07-10 NOTE — PROGRESS NOTES
In preparation for discharge, I have completed AVS med-updates, Care Plans and Education in [facility] and have sent message to CMS to assist with scheduling of PCP appointment. Case Management is to set up home health. Will continue to follow     After talking to patient further,she stated that her family (who are out of the country) and her friend have concerns about patient returning to independent living today. Case Management talked to patient, her family and Dr. Elinor Ch and discharge will be postponed until tomorrow when grandson will be available to assist in discharge. Patient stated that she received the \"Stroke Binder\" and had read it. Annette Palmer .Stroke Education provided to patient and the following topics were discussed    1. Patients personal risk factors for stroke are hyperlipidemia and prior stroke    2. Warning signs of Stroke:        * Sudden numbness or weakness of the face, arm or leg, especially on one side of          The body            * Sudden confusion, trouble speaking or understanding        * Sudden trouble seeing in one or both eyes        * Sudden trouble walking, dizziness, loss of balance or coordination        * Sudden severe headache with no known cause      3. Importance of activation Emergency Medical Services ( 9-1-1 ) immediately if experience any warning signs of stroke. 4. Be sure and schedule a follow-up appointment with your primary care doctor or any specialists as instructed. 5. You must take medicine every day to treat your risk factors for stroke. Be sure to take your medicines exactly as your doctor tells you: no more, no less. Know what your medicines are for , what they do. Anti-thrombotics /anticoagulants can help prevent strokes. You are taking the following medicine(s)  atorvastatin     6. Smoking and second-hand smoke greatly increase your risk of stroke, cardiovascular disease and death. Smoking history never    7.  Information provided was [facility] Stroke Education Binder    8. Documentation of teaching completed in Patient Education Activity and on Care Plan with teaching response noted?   yes

## 2019-07-10 NOTE — PROGRESS NOTES
Problem: Falls - Risk of  Goal: *Absence of Falls  Description  Document Bryanna Late Fall Risk and appropriate interventions in the flowsheet.   Outcome: Progressing Towards Goal

## 2019-07-10 NOTE — DISCHARGE SUMMARY
Physician Discharge Summary     Patient ID:  Nahomy Thorpe  [id number]  89 y.o.  7/3/1928    Admit date: 7/8/2019    Discharge date: 7/11/2019    Admission Diagnoses: TIA (transient ischemic attack) [G45.9]    Discharge Diagnoses:  Principal Diagnosis Expressive aphasia                                            Principal Problem:    Expressive aphasia (7/9/2019)    Active Problems:    Acquired hypothyroidism (3/17/2019)      C[facility] ([facility] (7/8/2019)      Headache (7/8/2019)         Resolved Problems:  Problem List as of 7/11/2019 Date Reviewed: 7/9/2019          Codes Class Noted - Resolved    Cervical stenosis of spine (Chronic) ICD-10-CM: M48.02  ICD-9-CM: 723.0  7/9/2019 - Present        * (Principal) Expressive aphasia ICD-10-CM: R47.01  ICD-9-CM: 784.3  7/9/2019 - Present        Cerebral amyloid angiopathy ([facility] (Chronic) ICD-10-CM: E85.4, I68.0  ICD-9-CM: 277.39, 437.9  7/8/2019 - Present        Headache ICD-10-CM: R51  ICD-9-CM: 784.0  7/8/2019 - Present        Acquired hypothyroidism (Chronic) ICD-10-CM: E03.9  ICD-9-CM: 244.9  3/17/2019 - Present        RESOLVED: TIA (transient ischemic attack) ICD-10-CM: G45.9  ICD-9-CM: 435.9  7/8/2019 - 7/9/2019        RESOLVED: ICH (intracerebral hemorrhage) ([facility] ICD-10-CM: I61.9  ICD-9-CM: 535  3/18/2019 - 7/9/2019        RESOLVED: CVA (cerebral vascular accident) [facility]) ICD-10-CM: I63.9  ICD-9-CM: 434.91  3/17/2019 - 7/9/2019                Hospital Course:   Ms. Charito Perkins was admitted to the Hospitalist Service on the 3rd floor for treatment of TIA. She had no recurrence of her symptoms. She was evaluated by Neurology. MRI/MRA did not show any new disease but were consistent with cerebral amyloid angiopathy and so she was not started on anticoagulation or an antiplatelet. Her LDL was elevated and she will be discharged on a statin. She was discharged home on 7/11/2019 in improved condition.         PCP: Agustin Jacobson MD    Consults: Neurology    Discharge Exam:  See my Progress Note from today. Disposition: home    Patient Instructions:   Discharge Medication List as of 7/11/2019  9:24 AM      START taking these medications    Details   pravastatin (PRAVACHOL) 20 mg tablet Take 1 Tab by mouth nightly., Normal, Disp-30 Tab, R-0         CONTINUE these medications which have NOT CHANGED    Details   tolterodine ER (DETROL-LA) 2 mg ER capsule Take 2 mg by mouth nightly., Historical Med      levothyroxine (SYNTHROID) 25 mcg tablet TAKE 1 TABLET IN THE MORNING BEFORE BREAKFAST, Normal, Disp-60 Tab, R-6      cycloSPORINE (RESTASIS) 0.05 % ophthalmic emulsion Administer 1 Drop to both eyes two (2) times a day., Historical Med      brinzolamide (AZOPT) 1 % ophthalmic suspension Administer 1 Drop to right eye two (2) times a day., Historical Med      brimonidine-timolol (COMBIGAN) 0.2-0.5 % Drop ophthalmic solution Administer 1 Drop to right eye every twelve (12) hours. , Historical Med            Activity: Activity as tolerated  Diet: Cardiac Diet  Wound Care: None needed    Follow-up Information     Follow up With Specialties Details Why Katarina Mcduffie MD Internal Medicine Go on 7/24/2019  [facility]  [id number]  [phone]          25 minutes were spent on this discharge.     Signed:  Elaine Salazar MD  7/11/2019  12:54 PM

## 2019-07-10 NOTE — DISCHARGE INSTRUCTIONS
HOSPITALIST DISCHARGE INSTRUCTIONS  NAME: Isaac Hernandez   :  7/3/1928   MRN:  [id number]     Date/Time:  7/10/2019 12:54 PM    ADMIT DATE: 2019     DISCHARGE DATE: 7/10/2019     DISCHARGE DIAGNOSIS:  TIA    MEDICATIONS:  · It is important that you take the medication exactly as they are prescribed. · Keep your medication in the bottles provided by the pharmacist and keep a list of the medication names, dosages, and times to be taken in your wallet. · Do not take other medications without consulting your doctor. Pain Management: per above medications    What to do at Home    Recommended diet:  Cardiac Diet    Recommended activity: Activity as tolerated. NO DRIVING for 6 months from this admission!!    If you experience any of the following symptoms then please call your primary care physician or return to the emergency room if you cannot get hold of your doctor:  Fever, chills, nausea, vomiting, diarrhea, change in mentation, falling, bleeding, shortness of breath    Follow Up: Follow-up Information     Follow up With Specialties Details Why Contact Tiffany Rodriguez MD Internal Medicine In 2 weeks  [place] [address]  [address] [address] [place], [place] [address] [phone]              Information obtained by :  I understand that if any problems occur once I am at home I am to contact my physician. I understand and acknowledge receipt of the instructions indicated above.                                                                                                                                            Physician's or R.N.'s Signature                                                                  Date/Time                                                                                                                                              Patient or Representative Signature                                                          Date/Time

## 2019-07-10 NOTE — PHYSICIAN ADVISORY
Letter of Status Determination:  
Recommend hospitalization status upgraded from OBSERVATION  to INPATIENT  Status Pt Name:  Kathleen Reinoso MR#  
[place] # [id number] / 
[id number] Payor: Luis Correia / Plan: [address] / Product Type: Medicare /   
[place]#  [id number] [facility]  331/01  @ [address] [facility] Hospitalization date  7/8/2019  9:06 PM  
Current Attending Physician  Sage Pryor MD  
Principal diagnosis  TIA (transient ischemic attack) [G45.9] Clinicals  80 y.o. y.o  female hospitalized with above diagnosis The pt has had intermitted neurological deficits manifesting with aphasia. MRI shows amyloid angiopathy. Her risk of deterioration remain high. Due to appropriate and necessary medical care, this pt's hospitalization has now exceeded two midnights . [facility] criteria Does  NOT apply STATUS DETERMINATION  This patient is at above high risk of deterioration based on documented presenting clinical data, comorbid conditions, high risk of adverse events and current acute care course. Ms. Kathleen Reinoso now meets Inpatient Admission status criteria in accordance with CMS regulation Section 43 .3. Specifically, due to medical necessity the patient's stay now exceeds Two Midnights. It is our recommendation that this patient's hospitalization status should be upgraded from  OBSERVATION to INPATIENT status. The final decision of the patient's hospitalization status depends on the attending physician's judgment Additional comments Payor: Luis Correia / Plan: [address] / Product Type: Medicare /   
  
 
Salty Fink MD MPH FACP Cell: [phone] Physician Advisor [address]  
   
 Cell  [phone]   
 
 
[phone] [facility]

## 2019-07-10 NOTE — ROUTINE PROCESS
Attempted to schedule PCP HI apt with Dr. Mona Bey, office stated that they had no record of the patient.

## 2019-07-10 NOTE — PROGRESS NOTES
Bedside shift change report given to [facility] (oncoming nurse) by Tommy Abreu (offgoing nurse). Report included the following information SBAR, Kardex, Intake/Output, MAR, Recent Results and Cardiac Rhythm NSR.     1530 Discharge order for today was changed to 7.11.19. Bedside shift change report given to Tommy Abreu (oncoming nurse) by [facility] (offgoing nurse). Report included the following information SBAR, Kardex, ED Summary, Intake/Output, MAR, Recent Results, Med Rec Status and Cardiac Rhythm NSR.

## 2019-07-10 NOTE — PROGRESS NOTES
Hermilo Stover [address]na [place] [address]  [address] [facility], [address] [place] [address]  [phone]      Medical Progress Note      NAME: Isaac Hernandez   :  7/3/1928  MRM:  [id number]    Date/Time: 7/10/2019  8:16 AM        Subjective:     Chief Complaint:  Expressive aphasia: mild, intermittent, improved overall     ROS:  (bold if positive, if negative)                        Tolerating Diet          Objective:       Vitals:          Last 24hrs VS reviewed since prior progress note.  Most recent are:    Visit Vitals  /69 (BP 1 Location: Left arm, BP Patient Position: At rest)   Pulse 78   Temp 97.9 °F (36.6 °C)   Resp 16   Ht 5' 1\" (1.549 m)   Wt 48.3 kg (106 lb 6.4 oz)   SpO2 96%   BMI 20.10 kg/m²     SpO2 Readings from Last 6 Encounters:   07/10/19 96%   19 94%   19 98%   19 98%   19 98%   14 96%            Intake/Output Summary (Last 24 hours) at 7/10/2019 0816  Last data filed at 7/10/2019 0726  Gross per 24 hour   Intake 850 ml   Output 1400 ml   Net -550 ml          Exam:     Physical Exam:    Gen:  Well-developed, well-nourished, elderly, in no acute distress  HEENT:  Pink conjunctivae, PERRL, hearing intact to voice, moist mucous membranes  Neck:  Supple, without masses, thyroid non-tender  Resp:  No accessory muscle use, clear breath sounds without wheezes rales or rhonchi  Card:  No murmurs, normal S1, S2 without thrills, bruits or peripheral edema  Abd:  Soft, non-tender, non-distended, normoactive bowel sounds are present, no palpable organomegaly and no detectable hernias  Lymph:  No cervical or inguinal adenopathy  Musc:  No cyanosis or clubbing  Skin:  No rashes or ulcers, skin turgor is good  Neuro:  Cranial nerves are grossly intact, no focal motor weakness, follows commands appropriately  Psych:  Good insight, oriented to person, place and time, alert       Telemetry reviewed:   normal sinus rhythm    Medications Reviewed: (see below)    Lab Data Reviewed: (see below)    ______________________________________________________________________    Medications:     Current Facility-Administered Medications   Medication Dose Route Frequency    cycloSPORINE (RESTASIS) 0.05 % ophthalmic emulsion 1 Drop  1 Drop Both Eyes BID    levothyroxine (SYNTHROID) tablet 25 mcg  25 mcg Oral 6am    brimonidine (ALPHAGAN) 0.2 % ophthalmic solution 1 Drop  1 Drop Right Eye Q12H    And    timolol (TIMOPTIC) 0.5 % ophthalmic solution 1 Drop  1 Drop Right Eye Q12H    dorzolamide (TRUSOPT) 2 % ophthalmic solution 1 Drop  1 Drop Right Eye BID    sodium chloride (NS) flush 5-40 mL  5-40 mL IntraVENous Q8H    sodium chloride (NS) flush 5-40 mL  5-40 mL IntraVENous PRN    acetaminophen (TYLENOL) tablet 650 mg  650 mg Oral Q4H PRN    Or    acetaminophen (TYLENOL) solution 650 mg  650 mg Per NG tube Q4H PRN    Or    acetaminophen (TYLENOL) suppository 650 mg  650 mg Rectal Q4H PRN    sodium chloride (NS) flush 5-40 mL  5-40 mL IntraVENous Q8H    sodium chloride (NS) flush 5-40 mL  5-40 mL IntraVENous PRN    acetaminophen (TYLENOL) tablet 650 mg  650 mg Oral Q6H PRN    naloxone (NARCAN) injection 0.4 mg  0.4 mg IntraVENous PRN            Lab Review:     Recent Labs     07/09/19  0401 07/08/19 2127   WBC 5.0 6.7   HGB 13.0 13.9   HCT 38.2 40.5    230     Recent Labs     07/09/19  0401 07/08/19 2127    133*   K 3.8 3.5    101   CO2 27 26   GLU 98 94   BUN 10 15   CREA 0.66 0.75   CA 8.3* 8.4*   MG 2.2  --    PHOS 3.4  --    ALB 3.4* 3.6   TBILI 0.3 0.4   SGOT 14* 21   ALT 15 21   INR  --  1.0     Lab Results   Component Value Date/Time    Glucose (POC) 106 (H) 03/17/2019 10:01 PM     No results for input(s): PH, PCO2, PO2, HCO3, FIO2 in the last 72 hours.   Recent Labs     07/08/19 2127   INR 1.0     No results found for: SDES  No results found for: CULT         Assessment:     Principal Problem:    Expressive aphasia (7/9/2019)    Active Problems:    Acquired hypothyroidism (3/17/2019)      Cerebral amyloid angiopathy ([address] (7/8/2019)      Headache (7/8/2019)           Plan:     Principal Problem:    Expressive aphasia (7/9/2019) with Cerebral amyloid angiopathy ([address] (7/8/2019)   - high risk of ICH so anticoagulation contraindicated   - Neurology input appreciated   - MRI/MRA negative   - LDL is up some, will discharge on statin if recommended by Neurology    Active Problems:    Acquired hypothyroidism (3/17/2019)   - continue LT4      Headache (7/8/2019)   - improved      Total time spent in patient care: 25 minutes                  Care Plan discussed with: Patient, Care Manager and Nursing Staff    Discussed:  Code Status, Care Plan and D/C Planning    Prophylaxis:  SCD's    Disposition:  Home w/Family           ___________________________________________________    Attending Physician: Oliva Mills MD

## 2019-07-10 NOTE — PROGRESS NOTES
Patient is being discharged today. HH orders and AVS sent to [facility] in [employer]. Choice letter is on the chart. Patient has used that agency before. She has transportation home. Pt is ready for dc form cm standpoint.  Thanks LIBRA Manzanares

## 2019-07-11 VITALS
RESPIRATION RATE: 17 BRPM | TEMPERATURE: 97.5 F | WEIGHT: 106.3 LBS | DIASTOLIC BLOOD PRESSURE: 68 MMHG | SYSTOLIC BLOOD PRESSURE: 126 MMHG | OXYGEN SATURATION: 95 % | HEIGHT: 61 IN | BODY MASS INDEX: 20.07 KG/M2 | HEART RATE: 67 BPM

## 2019-07-11 PROCEDURE — 74011250637 HC RX REV CODE- 250/637: Performed by: INTERNAL MEDICINE

## 2019-07-11 PROCEDURE — 99218 HC RM OBSERVATION: CPT

## 2019-07-11 RX ADMIN — LEVOTHYROXINE SODIUM 25 MCG: 25 TABLET ORAL at 06:24

## 2019-07-11 RX ADMIN — CYCLOSPORINE 1 DROP: 0.5 EMULSION OPHTHALMIC at 09:12

## 2019-07-11 RX ADMIN — DORZOLAMIDE HYDROCHLORIDE 1 DROP: 20 SOLUTION/ DROPS OPHTHALMIC at 08:50

## 2019-07-11 RX ADMIN — TIMOLOL MALEATE 1 DROP: 5 SOLUTION/ DROPS OPHTHALMIC at 08:55

## 2019-07-11 RX ADMIN — Medication 10 ML: at 06:24

## 2019-07-11 RX ADMIN — BRIMONIDINE TARTRATE 1 DROP: 2 SOLUTION OPHTHALMIC at 09:00

## 2019-07-11 RX ADMIN — Medication 10 ML: at 06:25

## 2019-07-11 NOTE — PROGRESS NOTES
Discharge instructions, including information on new medication, were reviewed with patient. All questions were answered. IV and heart monitor were removed. Patient received a copy of her discharge papers and will be discharged back to [place] [address] [address] with her grandson. Case Management has set up home health. Her prescription for Pravastatin was called in to the [facility] at [address] [place] [address] at her request because she no longer used [facility] in [place]. PCP appointment with Dr. Norman Julio was cancelled at patient's request - she states she no longer sees that doctor and will call her PCP and schedule a hospital follow up appointment.

## 2019-07-11 NOTE — PROGRESS NOTES
Patient is being discharged today. Her grandson Gunnar Leblanc will be here at 11:30 to transport her home. I have notified [address] of discharge.  LIBRA Vasquez

## 2019-07-11 NOTE — PROGRESS NOTES
Hermilo Stover [address]na [place] [address]  [address]  [phone]      Medical Progress Note      NAME: Gabriel Leal   :  7/3/1928  MRM:  [id number]    Date/Time: 2019  8:40 AM        Subjective:     Chief Complaint:  Expressive aphasia: mild, intermittent, improved overall     ROS:  (bold if positive, if negative)                        Tolerating Diet          Objective:       Vitals:          Last 24hrs VS reviewed since prior progress note.  Most recent are:    Visit Vitals  /68 (BP 1 Location: Left arm, BP Patient Position: At rest)   Pulse 67   Temp 97.5 °F (36.4 °C)   Resp 17   Ht 5' 1\" (1.549 m)   Wt 48.2 kg (106 lb 4.8 oz)   SpO2 95%   BMI 20.09 kg/m²     SpO2 Readings from Last 6 Encounters:   19 95%   19 94%   19 98%   19 98%   19 98%   14 96%            Intake/Output Summary (Last 24 hours) at 2019 0839  Last data filed at 2019 0505  Gross per 24 hour   Intake 660 ml   Output 1100 ml   Net -440 ml          Exam:     Physical Exam:    Gen:  Well-developed, well-nourished, elderly, in no acute distress  HEENT:  Pink conjunctivae, PERRL, hearing intact to voice, moist mucous membranes  Neck:  Supple, without masses, thyroid non-tender  Resp:  No accessory muscle use, clear breath sounds without wheezes rales or rhonchi  Card:  No murmurs, normal S1, S2 without thrills, bruits or peripheral edema  Abd:  Soft, non-tender, non-distended, normoactive bowel sounds are present, no palpable organomegaly and no detectable hernias  Lymph:  No cervical or inguinal adenopathy  Musc:  No cyanosis or clubbing  Skin:  No rashes or ulcers, skin turgor is good  Neuro:  Cranial nerves are grossly intact, no focal motor weakness, follows commands appropriately  Psych:  Good insight, oriented to person, place and time, alert       Telemetry reviewed:   normal sinus rhythm    Medications Reviewed: (see below)    Lab Data Reviewed: (see below)    ______________________________________________________________________    Medications:     Current Facility-Administered Medications   Medication Dose Route Frequency    cycloSPORINE (RESTASIS) 0.05 % ophthalmic emulsion 1 Drop  1 Drop Both Eyes BID    levothyroxine (SYNTHROID) tablet 25 mcg  25 mcg Oral 6am    brimonidine (ALPHAGAN) 0.2 % ophthalmic solution 1 Drop  1 Drop Right Eye Q12H    And    timolol (TIMOPTIC) 0.5 % ophthalmic solution 1 Drop  1 Drop Right Eye Q12H    dorzolamide (TRUSOPT) 2 % ophthalmic solution 1 Drop  1 Drop Right Eye BID    sodium chloride (NS) flush 5-40 mL  5-40 mL IntraVENous Q8H    sodium chloride (NS) flush 5-40 mL  5-40 mL IntraVENous PRN    acetaminophen (TYLENOL) tablet 650 mg  650 mg Oral Q4H PRN    Or    acetaminophen (TYLENOL) solution 650 mg  650 mg Per NG tube Q4H PRN    Or    acetaminophen (TYLENOL) suppository 650 mg  650 mg Rectal Q4H PRN    sodium chloride (NS) flush 5-40 mL  5-40 mL IntraVENous Q8H    sodium chloride (NS) flush 5-40 mL  5-40 mL IntraVENous PRN    acetaminophen (TYLENOL) tablet 650 mg  650 mg Oral Q6H PRN    naloxone (NARCAN) injection 0.4 mg  0.4 mg IntraVENous PRN            Lab Review:     Recent Labs     07/09/19  0401 07/08/19 2127   WBC 5.0 6.7   HGB 13.0 13.9   HCT 38.2 40.5    230     Recent Labs     07/09/19  0401 07/08/19 2127    133*   K 3.8 3.5    101   CO2 27 26   GLU 98 94   BUN 10 15   CREA 0.66 0.75   CA 8.3* 8.4*   MG 2.2  --    PHOS 3.4  --    ALB 3.4* 3.6   TBILI 0.3 0.4   SGOT 14* 21   ALT 15 21   INR  --  1.0     Lab Results   Component Value Date/Time    Glucose (POC) 106 (H) 03/17/2019 10:01 PM     No results for input(s): PH, PCO2, PO2, HCO3, FIO2 in the last 72 hours.   Recent Labs     07/08/19 2127   INR 1.0     No results found for: SDES  No results found for: CULT         Assessment:     Principal Problem:    Expressive aphasia (7/9/2019)    Active Problems:    Acquired hypothyroidism (3/17/2019)      Cerebral amyloid angiopathy ([address] (7/8/2019)      Headache (7/8/2019)           Plan:     Principal Problem:    Expressive aphasia (7/9/2019) with Cerebral amyloid angiopathy ([address] (7/8/2019)   - high risk of ICH so anticoagulation contraindicated   - Neurology input appreciated   - MRI/MRA negative   - home on statin   - not discharged yesterday because family was not available, home today    Active Problems:    Acquired hypothyroidism (3/17/2019)   - continue LT4      Headache (7/8/2019)   - improved      Total time spent in patient care: 25 minutes                  Care Plan discussed with: Patient, Care Manager and Nursing Staff    Discussed:  Code Status, Care Plan and D/C Planning    Prophylaxis:  SCD's    Disposition:  Home w/Family           ___________________________________________________    Attending Physician: Alexis Escudero MD

## 2019-07-11 NOTE — PROGRESS NOTES
Shift Summary    Bedside and Verbal shift change report given to Delfino Kern RN (oncoming nurse) by Christy Guajardo RN (offgoing nurse). Report included the following information SBAR, Kardex, MAR, Recent Results and Cardiac Rhythm  . Spoke with patient regarding discharge. Plan to call grandson who will be picking her up. The rest of her family will be returning from [facility] tomorrow    Discharge nurse in to review instructions. Patient had no questions or concerns following.

## 2019-07-11 NOTE — PROGRESS NOTES
1900   Bedside and Verbal shift change report given to [address] [place] [address] Street (oncoming nurse) by Marina Castro RN (offgoing nurse). Report included the following information SBAR, Kardex, Procedure Summary, Intake/Output, MAR, Accordion and Recent Results. Patient on bed. Initial assessment done. No complaints of pain. Scheduled meds given. Will continue to monitor. Bed alarm on. Aware to call before getting out of bed. Visit Vitals  BP 95/59 (BP 1 Location: Left arm, BP Patient Position: At rest)   Pulse 89   Temp 97.6 °F (36.4 °C)   Resp 17   Ht 5' 1\" (1.549 m)   Wt 48.2 kg (106 lb 4.8 oz)   SpO2 97%   BMI 20.09 kg/m²     0700  Bedside and Verbal shift change report given to Esteban MARTINEZ (oncoming nurse) by Yoel Kiran RN (offgoing nurse). Report included the following information SBAR, Kardex, Procedure Summary, Intake/Output, MAR, Accordion and Recent Results.

## 2020-06-29 ENCOUNTER — OFFICE VISIT (OUTPATIENT)
Dept: NEUROLOGY | Age: 85
End: 2020-06-29

## 2020-06-29 VITALS
BODY MASS INDEX: 19.6 KG/M2 | RESPIRATION RATE: 18 BRPM | SYSTOLIC BLOOD PRESSURE: 104 MMHG | WEIGHT: 103.8 LBS | DIASTOLIC BLOOD PRESSURE: 52 MMHG | OXYGEN SATURATION: 97 % | TEMPERATURE: 98.5 F | HEART RATE: 87 BPM | HEIGHT: 61 IN

## 2020-06-29 DIAGNOSIS — R41.3 MEMORY LOSS: ICD-10-CM

## 2020-06-29 DIAGNOSIS — R26.89 IMBALANCE: ICD-10-CM

## 2020-06-29 DIAGNOSIS — I68.0 CEREBRAL AMYLOID ANGIOPATHY (CODE): Primary | ICD-10-CM

## 2020-06-29 RX ORDER — DONEPEZIL HYDROCHLORIDE 10 MG/1
TABLET, FILM COATED ORAL
Qty: 90 TAB | Refills: 3 | Status: ON HOLD | OUTPATIENT
Start: 2020-06-29 | End: 2021-06-02

## 2020-06-29 NOTE — PROGRESS NOTES
Pt here to follow up on her stroke symptoms. She is having more memory problems and her balance is worse.

## 2020-06-29 NOTE — PROGRESS NOTES
Neurology Consult Note      HISTORY PROVIDED BY: patient and friend    Chief Complaint:   Chief Complaint   Patient presents with    Stroke      Subjective:   Pt is a 80 y.o. right handed female last seen in clinic on 4/22/19 after hospitalized 3/18/19 - 3/21/19, presenting to [facility] with aphasia, right sided weakness. CT head, CTA H/N - without acute findings, no LVO. S/p IV TPA, noted to have worsening aphasia, repeat head CT with SAH in right frontal and occipital lobe and left occipital IPH. 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 MRI brain wo contrast 3/18/19 with stable hemorrhages, multiple foci of low signal on GRE c/w cerebral amyloid angiopathy, no DWI + lesions, mild CIWM disease. Exam was significantly improved, mild word finding that could be c/w age, but pt noting that it was new since her stroke. Reviewed MRI findings in PACS with pt. Discussed diagnosis of CAA. Discussed avoiding antiplatelet drugs and anticoagulation due to increase risk of cerebral hemorrhage. Discussed association of dementia with CAA. She returns for f/u. She believes she has had a decline in memory. She gives example of making arrangements to attend an exercise class, started talking to her daughter on Facetime and forgot the appt. Not paying her own bills, her DIL who is an  has been taking care of this. She takes care of her ADLs. Notes that food is of low importance, eats to stay alive. Her  friend, Marge Olivo, believes that the quarantine been tough on her. She is concerned she may have had another stroke. Her balance has worsened. Using a cane, had one fall, was in [place] with daughter in February, turned quickly around furniture and fell. Her friend reminds her that she fell in her kitchen. She was seen at [facility] and had a gait assessment, then had PT with [facility] for 4-6 weeks.   He talks to pt daily and they have lunch on Mondays x years.  Getting in and out of the car is becoming an issue. He ask how I feel about her traveling alone to [place] to see her daughter, no definite travel plans yet. Previous testing:  CT head 3/18/19 , CTA H/N - without acute findings, no LVO. S/p IV TPA, noted to have worsening aphasia, repeat head CT with SAH in right frontal and occipital lobe and left occipital IPH. MRI brain wo contrast 3/18/19 with stable hemorrhages, multiple foci of low signal on GRE c/w cerebral amyloid angiopathy, no DWI + lesions, mild CIWM disease. Past Medical History:   Diagnosis Date    Cerebral amyloid angiopathy (HCC)     CVA (cerebral vascular accident) ([facility] [address] 03/2019    aphasia and right sided weakness, s/p IV TPA with subsequent SAH/IPH, found to have change c/w CAA    Hypothyroid       Past Surgical History:   Procedure Laterality Date    HX BACK SURGERY      HX HIP REPLACEMENT      right    HX HYSTERECTOMY      HX TONSILLECTOMY        Social History     Socioeconomic History    Marital status:      Spouse name: Not on file    Number of children: Not on file    Years of education: Not on file    Highest education level: Not on file   Occupational History    Not on file   Social Needs    Financial resource strain: Not on file    Food insecurity     Worry: Not on file     Inability: Not on file    Transportation needs     Medical: Not on file     Non-medical: Not on file   Tobacco Use    Smoking status: Never Smoker    Smokeless tobacco: Never Used   Substance and Sexual Activity    Alcohol use:  Yes     Alcohol/week: 0.8 standard drinks     Types: 1 Glasses of wine per week     Comment: rarely    Drug use: No    Sexual activity: Not Currently   Lifestyle    Physical activity     Days per week: Not on file     Minutes per session: Not on file    Stress: Not on file   Relationships    Social connections     Talks on phone: Not on file     Gets together: Not on file     Attends Alevism service: Not on file     Active member of club or organization: Not on file     Attends meetings of clubs or organizations: Not on file     Relationship status: Not on file    Intimate partner violence     Fear of current or ex partner: Not on file     Emotionally abused: Not on file     Physically abused: Not on file     Forced sexual activity: Not on file   Other Topics Concern    Not on file   Social History Narrative    Not on file     Family History   Problem Relation Age of Onset    Dementia Mother         Dec 89yo, had memory loss for the last few years. Objective:   Review of Systems   Constitutional: Negative. HENT: Positive for hearing loss. Eyes: Negative. Respiratory: Negative. Cardiovascular: Positive for leg swelling. Gastrointestinal: Negative. Genitourinary: Negative. Musculoskeletal: Negative. Skin: Negative. Neurological: Positive for dizziness. Endo/Heme/Allergies: Negative. Psychiatric/Behavioral: Positive for memory loss. No Known Allergies     Meds:  Outpatient Medications Prior to Visit   Medication Sig Dispense Refill    levothyroxine (SYNTHROID) 25 mcg tablet TAKE 1 TABLET IN THE MORNING BEFORE BREAKFAST 60 Tab 6    cycloSPORINE (RESTASIS) 0.05 % ophthalmic emulsion Administer 1 Drop to both eyes two (2) times a day.  brinzolamide (AZOPT) 1 % ophthalmic suspension Administer 1 Drop to right eye two (2) times a day.  brimonidine-timolol (COMBIGAN) 0.2-0.5 % Drop ophthalmic solution Administer 1 Drop to right eye every twelve (12) hours.  pravastatin (PRAVACHOL) 20 mg tablet Take 1 Tab by mouth nightly. 30 Tab 0    tolterodine ER (DETROL-LA) 2 mg ER capsule Take 2 mg by mouth nightly. No facility-administered medications prior to visit.         Imaging:  MRI Results (most recent):  Results from Hospital Encounter encounter on 07/08/19   MRA BRAIN WO CONT    Narrative EXAM:  MRA BRAIN WO CONT    INDICATION:  Transient ischemic attack    COMPARISON: CT head July 8, 2019. CTA head and neck March 17, 2019. TECHNIQUE: 3-D time-of-flight noncontrast MRA of the brain was obtained with 3-D  maximum intensity projections and surface shaded rendered reformations. Contrast: None    FINDINGS:   The distal cervical internal carotid arteries are patent and normal in caliber. The cavernous carotid arteries are patent. The anterior cerebral and anterior  communicating arteries are patent. The middle cerebral arteries and distal  branches are patent. The distal vertebral arteries are patent. The basilar  artery, superior cerebellar, and posterior cerebral arteries are patent. The  right P1 segment is hypoplastic. The right posterior cerebral artery is fed by  the anterior circulation, fetal configuration. The right posterior communicating  artery is hypertrophied. The left posterior communicating artery is diminutive. Impression IMPRESSION:  Patent intracranial vasculature. No evidence of hemodynamically significant  stenosis or large vessel occlusion. Fetal right PCA. CT Results (most recent):  Results from Hospital Encounter encounter on 07/08/19   CT CODE NEURO HEAD WO CONTRAST    Narrative INDICATION: Aphasia, started at 5:45pm.     Exam: Noncontrast CT of the brain is performed with 5 mm collimation. CT dose reduction was achieved to the use of a standardized protocol tailored  for this examination and automatic exposure control for dose modulation. FINDINGS: There is no acute intracranial hemorrhage, mass, mass effect or  herniation. There is age-appropriate diffuse cortical atrophy with ex vacuo  dilatation of the ventricular system. There are stable periventricular hypodense  attenuation consistent with chronic microvascular ischemic changes. There is no  evidence of acute territorial infarct. The mastoid air cells are well  pneumatized.  The visualized paranasal sinuses are normal.      Impression IMPRESSION: No acute intracranial hemorrhage or infarct. Reviewed records in [facility] tab today    Lab Review   Results for orders placed or performed during the hospital encounter of 07/08/19   URINE CULTURE HOLD SAMPLE   Result Value Ref Range    Urine culture hold        URINE ON HOLD IN MICROBIOLOGY DEPT FOR 3 DAYS. IF UNPRESERVED URINE IS SUBMITTED, IT CANNOT BE USED FOR ADDITIONAL TESTING AFTER 24 HRS, RECOLLECTION WILL BE REQUIRED. SAMPLES BEING HELD   Result Value Ref Range    SAMPLES BEING HELD RED,GOLD,GRN     COMMENT        Add-on orders for these samples will be processed based on acceptable specimen integrity and analyte stability, which may vary by analyte. CBC WITH AUTOMATED DIFF   Result Value Ref Range    WBC 6.7 3.6 - 11.0 K/uL    RBC 4.18 3.80 - 5.20 M/uL    HGB 13.9 11.5 - 16.0 g/dL    HCT 40.5 35.0 - 47.0 %    MCV 96.9 80.0 - 99.0 FL    MCH 33.3 26.0 - 34.0 PG    MCHC 34.3 30.0 - 36.5 g/dL    RDW 11.9 11.5 - 14.5 %    PLATELET 829 874 - 203 K/uL    MPV 8.7 (L) 8.9 - 12.9 FL    NRBC 0.0 0  WBC    ABSOLUTE NRBC 0.00 0.00 - 0.01 K/uL    NEUTROPHILS 58 32 - 75 %    LYMPHOCYTES 29 12 - 49 %    MONOCYTES 9 5 - 13 %    EOSINOPHILS 3 0 - 7 %    BASOPHILS 1 0 - 1 %    IMMATURE GRANULOCYTES 0 0.0 - 0.5 %    ABS. NEUTROPHILS 3.9 1.8 - 8.0 K/UL    ABS. LYMPHOCYTES 1.9 0.8 - 3.5 K/UL    ABS. MONOCYTES 0.6 0.0 - 1.0 K/UL    ABS. EOSINOPHILS 0.2 0.0 - 0.4 K/UL    ABS. BASOPHILS 0.1 0.0 - 0.1 K/UL    ABS. IMM.  GRANS. 0.0 0.00 - 0.04 K/UL    DF AUTOMATED     METABOLIC PANEL, COMPREHENSIVE   Result Value Ref Range    Sodium 133 (L) 136 - 145 mmol/L    Potassium 3.5 3.5 - 5.1 mmol/L    Chloride 101 97 - 108 mmol/L    CO2 26 21 - 32 mmol/L    Anion gap 6 5 - 15 mmol/L    Glucose 94 65 - 100 mg/dL    BUN 15 6 - 20 MG/DL    Creatinine 0.75 0.55 - 1.02 MG/DL    BUN/Creatinine ratio 20 12 - 20      GFR est AA >60 >60 ml/min/1.73m2    GFR est non-AA >60 >60 ml/min/1.73m2    Calcium 8.4 (L) 8.5 - 10.1 MG/DL Bilirubin, total 0.4 0.2 - 1.0 MG/DL    ALT (SGPT) 21 12 - 78 U/L    AST (SGOT) 21 15 - 37 U/L    Alk.  phosphatase 76 45 - 117 U/L    Protein, total 7.4 6.4 - 8.2 g/dL    Albumin 3.6 3.5 - 5.0 g/dL    Globulin 3.8 2.0 - 4.0 g/dL    A-G Ratio 0.9 (L) 1.1 - 2.2     TROPONIN I   Result Value Ref Range    Troponin-I, Qt. <0.05 <0.05 ng/mL   PTT   Result Value Ref Range    aPTT 25.9 22.1 - 32.0 sec    aPTT, therapeutic range     58.0 - 77.0 SECS   PROTHROMBIN TIME + INR   Result Value Ref Range    INR 1.0 0.9 - 1.1      Prothrombin time 9.9 9.0 - 11.1 sec   URINALYSIS W/MICROSCOPIC   Result Value Ref Range    Color YELLOW/STRAW      Appearance CLEAR CLEAR      Specific gravity 1.005 1.003 - 1.030      pH (UA) 6.5 5.0 - 8.0      Protein NEGATIVE  NEG mg/dL    Glucose NEGATIVE  NEG mg/dL    Ketone NEGATIVE  NEG mg/dL    Bilirubin NEGATIVE  NEG      Blood NEGATIVE  NEG      Urobilinogen 0.2 0.2 - 1.0 EU/dL    Nitrites NEGATIVE  NEG      Leukocyte Esterase SMALL (A) NEG      WBC 0-4 0 - 4 /hpf    RBC 0-5 0 - 5 /hpf    Epithelial cells FEW FEW /lpf    Bacteria NEGATIVE  NEG /hpf   LIPID PANEL   Result Value Ref Range    LIPID PROFILE          Cholesterol, total 163 <200 MG/DL    Triglyceride 104 <150 MG/DL    HDL Cholesterol 46 MG/DL    LDL, calculated 96.2 0 - 100 MG/DL    VLDL, calculated 20.8 MG/DL    CHOL/HDL Ratio 3.5 0.0 - 5.0     HEMOGLOBIN A1C WITH EAG   Result Value Ref Range    Hemoglobin A1c 5.7 4.2 - 6.3 %    Est. average glucose 784 mg/dL   METABOLIC PANEL, COMPREHENSIVE   Result Value Ref Range    Sodium 138 136 - 145 mmol/L    Potassium 3.8 3.5 - 5.1 mmol/L    Chloride 105 97 - 108 mmol/L    CO2 27 21 - 32 mmol/L    Anion gap 6 5 - 15 mmol/L    Glucose 98 65 - 100 mg/dL    BUN 10 6 - 20 MG/DL    Creatinine 0.66 0.55 - 1.02 MG/DL    BUN/Creatinine ratio 15 12 - 20      GFR est AA >60 >60 ml/min/1.73m2    GFR est non-AA >60 >60 ml/min/1.73m2    Calcium 8.3 (L) 8.5 - 10.1 MG/DL    Bilirubin, total 0.3 0.2 - 1.0 MG/DL    ALT (SGPT) 15 12 - 78 U/L    AST (SGOT) 14 (L) 15 - 37 U/L    Alk. phosphatase 62 45 - 117 U/L    Protein, total 6.4 6.4 - 8.2 g/dL    Albumin 3.4 (L) 3.5 - 5.0 g/dL    Globulin 3.0 2.0 - 4.0 g/dL    A-G Ratio 1.1 1.1 - 2.2     CBC WITH AUTOMATED DIFF   Result Value Ref Range    WBC 5.0 3.6 - 11.0 K/uL    RBC 4.10 3.80 - 5.20 M/uL    HGB 13.0 11.5 - 16.0 g/dL    HCT 38.2 35.0 - 47.0 %    MCV 93.2 80.0 - 99.0 FL    MCH 31.7 26.0 - 34.0 PG    MCHC 34.0 30.0 - 36.5 g/dL    RDW 11.7 11.5 - 14.5 %    PLATELET 652 469 - 036 K/uL    MPV 8.4 (L) 8.9 - 12.9 FL    NRBC 0.0 0  WBC    ABSOLUTE NRBC 0.00 0.00 - 0.01 K/uL    NEUTROPHILS 54 32 - 75 %    LYMPHOCYTES 31 12 - 49 %    MONOCYTES 11 5 - 13 %    EOSINOPHILS 3 0 - 7 %    BASOPHILS 1 0 - 1 %    IMMATURE GRANULOCYTES 0 0.0 - 0.5 %    ABS. NEUTROPHILS 2.7 1.8 - 8.0 K/UL    ABS. LYMPHOCYTES 1.5 0.8 - 3.5 K/UL    ABS. MONOCYTES 0.5 0.0 - 1.0 K/UL    ABS. EOSINOPHILS 0.1 0.0 - 0.4 K/UL    ABS. BASOPHILS 0.0 0.0 - 0.1 K/UL    ABS. IMM.  GRANS. 0.0 0.00 - 0.04 K/UL    DF AUTOMATED     MAGNESIUM   Result Value Ref Range    Magnesium 2.2 1.6 - 2.4 mg/dL   PHOSPHORUS   Result Value Ref Range    Phosphorus 3.4 2.6 - 4.7 MG/DL   TSH 3RD GENERATION   Result Value Ref Range    TSH 3.32 0.36 - 3.74 uIU/mL   EKG, 12 LEAD, INITIAL   Result Value Ref Range    Ventricular Rate 68 BPM    Atrial Rate 68 BPM    P-R Interval 148 ms    QRS Duration 80 ms    Q-T Interval 424 ms    QTC Calculation (Bezet) 450 ms    Calculated P Axis 74 degrees    Calculated R Axis 39 degrees    Calculated T Axis 69 degrees    Diagnosis       Normal sinus rhythm  Septal infarct (cited on or before 17-MAR-2019)  Abnormal ECG  When compared with ECG of 17-MAR-2019 22:18,  No significant change was found  Confirmed by Taqueria HOUGH, Brenden ([id number]) on 7/9/2019 11:14:08 AM     DUPLEX CAROTID BILATERAL   Result Value Ref Range    Right subclavian sys 88.6 cm/s    RIGHT SUBCLAVIAN ARTERY D 0.00 cm/s    Right cca dist sys 69.4 cm/s    Right CCA dist higgins 6.6 cm/s    Right CCA prox sys 86.9 cm/s    Right CCA prox higgins 7.3 cm/s    Right eca sys 98.5 cm/s    RIGHT EXTERNAL CAROTID ARTERY D 0.00 cm/s    Right ICA dist sys 112.4 cm/s    Right ICA dist higgins 18.7 cm/s    Right ICA mid sys 136.7 cm/s    Right ICA mid higgins 23.6 cm/s    Right ICA prox sys 85.3 cm/s    Right ICA prox higgins 16.1 cm/s    Right vertebral sys 58.9 cm/s    RIGHT VERTEBRAL ARTERY D 7.25 cm/s    Right ICA/CCA sys 2.0     Left subclavian sys 194.2 cm/s    LEFT SUBCLAVIAN ARTERY D 0.00 cm/s    Left CCA dist sys 72.1 cm/s    Left CCA dist higgins 5.8 cm/s    Left CCA prox sys 99.1 cm/s    Left CCA prox higgins 8.5 cm/s    Left ECA sys 95.3 cm/s    LEFT EXTERNAL CAROTID ARTERY D 0.00 cm/s    Left ICA dist sys 93.0 cm/s    Left ICA dist higgins 15.0 cm/s    Left ICA mid sys 98.5 cm/s    Left ICA mid higgins 19.4 cm/s    Left ICA prox sys 63.3 cm/s    Left ICA prox higgins 11.9 cm/s    Left vertebral sys 55.1 cm/s    LEFT VERTEBRAL ARTERY D 11.13 cm/s    Left ICA/CCA sys 1.37         Exam:  Visit Vitals  /52   Pulse 87   Temp 98.5 °F (36.9 °C)   Resp 18   Ht 5' 1\" (1.549 m)   Wt 47.1 kg (103 lb 12.8 oz)   SpO2 97%   BMI 19.61 kg/m²     General:  Alert, cooperative, no distress. Head:  Normocephalic, without obvious abnormality, atraumatic. Respiratory:  Heart:   Non labored breathing  Regular rate and rhythm, no murmurs   Neck:      Extremities: Warm, no cyanosis or edema. Pulses: 2+ radial pulses. Neurologic:  MS: Alert and oriented x 4, speech intact. Language- see MMSE. Corie Graven Attention and fund of knowledge appropriate. Recent and remote memory impaired to portions of history, has good memory of certain details.    Cranial Nerves:  II: visual fields    II: pupils    II: optic disc    III,VII: ptosis none   III,IV,VI: extraocular muscles  EOMI, no nystagmus or diplopia   V: facial light touch sensation     VII: facial muscle function   symmetric   VIII: hearing intact   IX: soft palate elevation     XI: trapezius strength     XI: sternocleidomastoid strength    XII: tongue       Motor: normal bulk and tone, no tremor              Strength: 5/5 throughout except 5-/5 right DF, no PD  Sensory:   Coordination: FTN intact  Gait: Unsteady gait, pushes off to stand, takes extra steps to turn. Tandem not attempted. Reflexes:     Mini Mental State Exam 2020   What is the Year 1   What is the Season 1   What is the Date 1   What is the Day 1   What is the Month 1   Where are we State 1   Where are we Country 1   Where are we [address] [place] [address] or [place] 1   Where are we Floor 1   Name three objects, then ask the patient to say them 3   Serial sevens Subtract 7 from 100 in increments 5   Ask for the three objects repeated above 3   Name a pencil 1   Name a watch 1   Have the patient repeat this phrase \"No ifs, ands, or buts\" 1   Three stage command: Take the paper in your right hand 1   Fold the paper in half 1   Put the paper on the floor 1   Read and obey the followin [employer] 1   Have the patient write a sentence 1   Have the patient copy a figure 0   Mini Mental Score 29          Assessment/Plan   Pt is a 80 y.o. right handed female with CAA diagnosed after hospitalized 3/18/19 - 3/21/19, presenting to [facility] with aphasia, right sided weakness, s/p IV TPA with subsequent SAH and IPH. 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 Today MMSE score 29/30 unable to copy, slightly unsteady gait. Discussed diagnosis of CAA and need to avoid antiplatelet drugs and anticoagulation due to increase risk of cerebral hemorrhage which puts her at risk for ischemic stroke. Repeat MRI brain wo contrast ordered. Discussed association of dementia with CAA. Start Aricept 5mg daily x 1 month, then 10mg daily, side effects reviewed. Recommend she return to [address] [place] [address] for PT with Michaela Tracey for gait and fall prevention.   Discussed my concerns regarding her traveling alone to [place] due to increased risk of falling and confusion related to fatigue and being out of her normal surroundings, and particularly at the moment, due to risk of COVID-19 infection. F/u in clinic in 6 months, instructed to call in the interim if needed. ICD-10-CM ICD-9-CM    1. Cerebral amyloid angiopathy (CODE) I68.0 277.39 MRI BRAIN WO CONT     437.9    2. Imbalance R26.89 781.2 REFERRAL TO PHYSICAL THERAPY      MRI BRAIN WO CONT   3. Memory loss R41.3 780.93 MRI BRAIN WO CONT       Signed:   Shilpi Lyon MD  6/29/2020

## 2020-06-29 NOTE — LETTER
6/29/20 Patient: Sushil Iniguez YOB: 1928 Date of Visit: 6/29/2020 Barbara Stern MD 
[facility] [address] [place] [address] 99 [id number] VIA Facsimile: [phone] Dear Barbara Stern MD, Thank you for referring Ms. Kyara Martins to [facility] for evaluation. My notes for this consultation are attached. If you have questions, please do not hesitate to call me. I look forward to following your patient along with you. Sincerely, Weston Marc MD

## 2020-07-16 ENCOUNTER — HOSPITAL ENCOUNTER (OUTPATIENT)
Dept: MRI IMAGING | Age: 85
Discharge: HOME | End: 2020-07-16
Attending: PSYCHIATRY & NEUROLOGY
Payer: MEDICARE

## 2020-07-16 DIAGNOSIS — R26.89 IMBALANCE: ICD-10-CM

## 2020-07-16 DIAGNOSIS — R41.3 MEMORY LOSS: ICD-10-CM

## 2020-07-16 DIAGNOSIS — I68.0 CEREBRAL AMYLOID ANGIOPATHY (CODE): ICD-10-CM

## 2020-07-16 PROCEDURE — 70551 MRI BRAIN STEM W/O DYE: CPT

## 2021-02-04 ENCOUNTER — APPOINTMENT (OUTPATIENT)
Dept: CT IMAGING | Age: 86
End: 2021-02-04
Attending: STUDENT IN AN ORGANIZED HEALTH CARE EDUCATION/TRAINING PROGRAM
Payer: MEDICARE

## 2021-02-04 ENCOUNTER — HOSPITAL ENCOUNTER (OUTPATIENT)
Age: 86
Setting detail: OBSERVATION
Discharge: HOME HEALTH | End: 2021-02-05
Attending: STUDENT IN AN ORGANIZED HEALTH CARE EDUCATION/TRAINING PROGRAM | Admitting: HOSPITALIST
Payer: MEDICARE

## 2021-02-04 DIAGNOSIS — I63.9 ACUTE ISCHEMIC STROKE (HCC): Primary | ICD-10-CM

## 2021-02-04 DIAGNOSIS — G45.9 TIA (TRANSIENT ISCHEMIC ATTACK): ICD-10-CM

## 2021-02-04 LAB
ALBUMIN SERPL-MCNC: 3.5 G/DL (ref 3.5–5)
ALBUMIN/GLOB SERPL: 1 {RATIO} (ref 1.1–2.2)
ALP SERPL-CCNC: 73 U/L (ref 45–117)
ALT SERPL-CCNC: 20 U/L (ref 12–78)
ANION GAP SERPL CALC-SCNC: 7 MMOL/L (ref 5–15)
AST SERPL-CCNC: 18 U/L (ref 15–37)
BASOPHILS # BLD: 0 K/UL (ref 0–0.1)
BASOPHILS NFR BLD: 1 % (ref 0–1)
BILIRUB SERPL-MCNC: 0.3 MG/DL (ref 0.2–1)
BUN SERPL-MCNC: 28 MG/DL (ref 6–20)
BUN/CREAT SERPL: 28 (ref 12–20)
CALCIUM SERPL-MCNC: 8.7 MG/DL (ref 8.5–10.1)
CHLORIDE SERPL-SCNC: 105 MMOL/L (ref 97–108)
CO2 SERPL-SCNC: 27 MMOL/L (ref 21–32)
COMMENT, HOLDF: NORMAL
CREAT SERPL-MCNC: 0.99 MG/DL (ref 0.55–1.02)
DIFFERENTIAL METHOD BLD: ABNORMAL
EOSINOPHIL # BLD: 0.1 K/UL (ref 0–0.4)
EOSINOPHIL NFR BLD: 2 % (ref 0–7)
ERYTHROCYTE [DISTWIDTH] IN BLOOD BY AUTOMATED COUNT: 12.1 % (ref 11.5–14.5)
GLOBULIN SER CALC-MCNC: 3.5 G/DL (ref 2–4)
GLUCOSE BLD STRIP.AUTO-MCNC: 95 MG/DL (ref 65–100)
GLUCOSE SERPL-MCNC: 98 MG/DL (ref 65–100)
HCT VFR BLD AUTO: 36.3 % (ref 35–47)
HGB BLD-MCNC: 12.2 G/DL (ref 11.5–16)
IMM GRANULOCYTES # BLD AUTO: 0 K/UL (ref 0–0.04)
IMM GRANULOCYTES NFR BLD AUTO: 0 % (ref 0–0.5)
INR PPP: 1 (ref 0.9–1.1)
LYMPHOCYTES # BLD: 1.7 K/UL (ref 0.8–3.5)
LYMPHOCYTES NFR BLD: 29 % (ref 12–49)
MCH RBC QN AUTO: 32.3 PG (ref 26–34)
MCHC RBC AUTO-ENTMCNC: 33.6 G/DL (ref 30–36.5)
MCV RBC AUTO: 96 FL (ref 80–99)
MONOCYTES # BLD: 0.5 K/UL (ref 0–1)
MONOCYTES NFR BLD: 8 % (ref 5–13)
NEUTS SEG # BLD: 3.4 K/UL (ref 1.8–8)
NEUTS SEG NFR BLD: 60 % (ref 32–75)
NRBC # BLD: 0 K/UL (ref 0–0.01)
NRBC BLD-RTO: 0 PER 100 WBC
PLATELET # BLD AUTO: 211 K/UL (ref 150–400)
PMV BLD AUTO: 9 FL (ref 8.9–12.9)
POTASSIUM SERPL-SCNC: 4 MMOL/L (ref 3.5–5.1)
PROT SERPL-MCNC: 7 G/DL (ref 6.4–8.2)
PROTHROMBIN TIME: 10.4 SEC (ref 9–11.1)
RBC # BLD AUTO: 3.78 M/UL (ref 3.8–5.2)
SAMPLES BEING HELD,HOLD: NORMAL
SERVICE CMNT-IMP: NORMAL
SODIUM SERPL-SCNC: 139 MMOL/L (ref 136–145)
TROPONIN I SERPL-MCNC: <0.05 NG/ML
WBC # BLD AUTO: 5.7 K/UL (ref 3.6–11)

## 2021-02-04 PROCEDURE — 96366 THER/PROPH/DIAG IV INF ADDON: CPT

## 2021-02-04 PROCEDURE — 99218 HC RM OBSERVATION: CPT

## 2021-02-04 PROCEDURE — 96365 THER/PROPH/DIAG IV INF INIT: CPT

## 2021-02-04 PROCEDURE — 70498 CT ANGIOGRAPHY NECK: CPT

## 2021-02-04 PROCEDURE — 36415 COLL VENOUS BLD VENIPUNCTURE: CPT

## 2021-02-04 PROCEDURE — 85610 PROTHROMBIN TIME: CPT

## 2021-02-04 PROCEDURE — 74011250636 HC RX REV CODE- 250/636: Performed by: HOSPITALIST

## 2021-02-04 PROCEDURE — 80053 COMPREHEN METABOLIC PANEL: CPT

## 2021-02-04 PROCEDURE — 0042T CT CODE NEURO PERF W CBF: CPT

## 2021-02-04 PROCEDURE — 93005 ELECTROCARDIOGRAM TRACING: CPT

## 2021-02-04 PROCEDURE — 85025 COMPLETE CBC W/AUTO DIFF WBC: CPT

## 2021-02-04 PROCEDURE — 74011250637 HC RX REV CODE- 250/637: Performed by: HOSPITALIST

## 2021-02-04 PROCEDURE — 82962 GLUCOSE BLOOD TEST: CPT

## 2021-02-04 PROCEDURE — 74011250637 HC RX REV CODE- 250/637: Performed by: STUDENT IN AN ORGANIZED HEALTH CARE EDUCATION/TRAINING PROGRAM

## 2021-02-04 PROCEDURE — 84484 ASSAY OF TROPONIN QUANT: CPT

## 2021-02-04 PROCEDURE — 70450 CT HEAD/BRAIN W/O DYE: CPT

## 2021-02-04 PROCEDURE — 99285 EMERGENCY DEPT VISIT HI MDM: CPT

## 2021-02-04 PROCEDURE — 74011000636 HC RX REV CODE- 636: Performed by: RADIOLOGY

## 2021-02-04 RX ORDER — GUAIFENESIN 100 MG/5ML
81 LIQUID (ML) ORAL DAILY
Status: DISCONTINUED | OUTPATIENT
Start: 2021-02-05 | End: 2021-02-05 | Stop reason: HOSPADM

## 2021-02-04 RX ORDER — ASPIRIN 325 MG
325 TABLET ORAL
Status: COMPLETED | OUTPATIENT
Start: 2021-02-04 | End: 2021-02-04

## 2021-02-04 RX ORDER — ACETAMINOPHEN 325 MG/1
650 TABLET ORAL
Status: DISCONTINUED | OUTPATIENT
Start: 2021-02-04 | End: 2021-02-05 | Stop reason: HOSPADM

## 2021-02-04 RX ORDER — LABETALOL HCL 20 MG/4 ML
5 SYRINGE (ML) INTRAVENOUS
Status: DISCONTINUED | OUTPATIENT
Start: 2021-02-04 | End: 2021-02-05 | Stop reason: HOSPADM

## 2021-02-04 RX ORDER — ONDANSETRON 2 MG/ML
4 INJECTION INTRAMUSCULAR; INTRAVENOUS
Status: DISCONTINUED | OUTPATIENT
Start: 2021-02-04 | End: 2021-02-05 | Stop reason: HOSPADM

## 2021-02-04 RX ORDER — ACETAMINOPHEN 650 MG/1
650 SUPPOSITORY RECTAL
Status: DISCONTINUED | OUTPATIENT
Start: 2021-02-04 | End: 2021-02-05 | Stop reason: HOSPADM

## 2021-02-04 RX ORDER — SODIUM CHLORIDE 9 MG/ML
75 INJECTION, SOLUTION INTRAVENOUS CONTINUOUS
Status: DISCONTINUED | OUTPATIENT
Start: 2021-02-04 | End: 2021-02-05

## 2021-02-04 RX ORDER — FACIAL-BODY WIPES
10 EACH TOPICAL DAILY PRN
Status: DISCONTINUED | OUTPATIENT
Start: 2021-02-04 | End: 2021-02-05 | Stop reason: HOSPADM

## 2021-02-04 RX ORDER — ATORVASTATIN CALCIUM 20 MG/1
80 TABLET, FILM COATED ORAL
Status: DISCONTINUED | OUTPATIENT
Start: 2021-02-04 | End: 2021-02-05

## 2021-02-04 RX ADMIN — ASPIRIN 325 MG: 325 TABLET ORAL at 23:38

## 2021-02-04 RX ADMIN — IOPAMIDOL 100 ML: 755 INJECTION, SOLUTION INTRAVENOUS at 19:37

## 2021-02-04 RX ADMIN — SODIUM CHLORIDE 75 ML/HR: 9 INJECTION, SOLUTION INTRAVENOUS at 23:31

## 2021-02-04 RX ADMIN — ATORVASTATIN CALCIUM 80 MG: 20 TABLET, FILM COATED ORAL at 23:38

## 2021-02-04 RX ADMIN — IOPAMIDOL 40 ML: 755 INJECTION, SOLUTION INTRAVENOUS at 19:38

## 2021-02-04 NOTE — ED TRIAGE NOTES
Patient presents to ED for c/o difficulty finding words. Denies headache, neck pain, vision changes. Slight droop noted     Does report previous hx of stroke. Signs and symptoms: expressive aphasia  VAN score: Positive  Last Known Well: 3211  Blood Glucose (EMS acceptable): 95   Blood Pressure (EMS acceptable): 133/83   Anticoagulants: none     Code Stroke Level 1 initiated at this time.

## 2021-02-04 NOTE — ED PROVIDER NOTES
Chief Complaint   Patient presents with   Vandana Cruz     This is a 26-year-old female with a history of previous stroke in 2019, cerebral amyloid angiopathy, brought in by her son for word finding difficulty that her son first noticed at 36 this evening when he came to check on her, she herself is unclear as to the exact time of onset. She says that she first noticed it when walking down the steps, however she was outside when her son came to visit. She also has a right-sided facial droop that her son describes as new today, she denies any headache, neck pain, vision loss, dizziness, lateralizing weakness or sensory loss, or any other systemic complaints. She does not take any anticoagulants. No chest pain or shortness of breath. No other systemic complaints. Symptoms are moderate in nature without any alleviating or exacerbating factors. Past Medical History:   Diagnosis Date    Cerebral amyloid angiopathy (HCC)     CVA (cerebral vascular accident) ([facility] [address] 03/2019    aphasia and right sided weakness, s/p IV TPA with subsequent SAH/IPH, found to have change c/w CAA    Hypothyroid        Past Surgical History:   Procedure Laterality Date    HX BACK SURGERY      HX HIP REPLACEMENT      right    HX HYSTERECTOMY      HX TONSILLECTOMY           Family History:   Problem Relation Age of Onset    Dementia Mother         Dec 94yo, had memory loss for the last few years.        Social History     Socioeconomic History    Marital status:      Spouse name: Not on file    Number of children: Not on file    Years of education: Not on file    Highest education level: Not on file   Occupational History    Not on file   Social Needs    Financial resource strain: Not on file    Food insecurity     Worry: Not on file     Inability: Not on file    Transportation needs     Medical: Not on file     Non-medical: Not on file   Tobacco Use    Smoking status: Never Smoker    Smokeless tobacco: Never Used   Substance and Sexual Activity    Alcohol use: Yes     Alcohol/week: 0.8 standard drinks     Types: 1 Glasses of wine per week     Comment: rarely    Drug use: No    Sexual activity: Not Currently   Lifestyle    Physical activity     Days per week: Not on file     Minutes per session: Not on file    Stress: Not on file   Relationships    Social connections     Talks on phone: Not on file     Gets together: Not on file     Attends L[employer]-day service: Not on file     Active member of club or organization: Not on file     Attends meetings of clubs or organizations: Not on file     Relationship status: Not on file    Intimate partner violence     Fear of current or ex partner: Not on file     Emotionally abused: Not on file     Physically abused: Not on file     Forced sexual activity: Not on file   Other Topics Concern    Not on file   Social History Narrative    Not on file         ALLERGIES: Patient has no known allergies. Review of Systems   Constitutional: Negative for fever. HENT: Negative for facial swelling. Eyes: Negative for visual disturbance. Respiratory: Negative for shortness of breath. Cardiovascular: Negative for chest pain. Gastrointestinal: Negative for abdominal pain and vomiting. Genitourinary: Negative for difficulty urinating. Musculoskeletal: Negative for neck pain. Skin: Negative for rash. Neurological: Positive for facial asymmetry. Negative for headaches. Psychiatric/Behavioral: Negative for confusion.        Vitals:    02/04/21 1850   BP: (!) 132/48   Pulse: 73   Resp: 15   Temp: 98.2 °F (36.8 °C)   SpO2: 95%   Weight: 43.5 kg (95 lb 14.4 oz)   Height: 5' (1.524 m)            Physical Exam  General:  Awake and alert, NAD  HEENT:  NC/AT, equal pupils, moist mucous membranes  Neck:   Normal inspection, full range of motion  Cardiac:  RRR, no murmurs  Respiratory:  Clear bilaterally, no wheezes, rales, rhonchi  Abdomen:  Soft and nontender, nondistended  Extremities: Warm and well perfused, no peripheral edema  Neuro:  +Slight right-sided facial droop, mild expressive aphasia. Moving all extremities symmetrically without gross motor deficit. Skin:   No rashes or pallor    RESULTS  Recent Results (from the past 12 hour(s))   GLUCOSE, POC    Collection Time: 02/04/21  6:16 PM   Result Value Ref Range    Glucose (POC) 95 65 - 100 mg/dL    Performed by Collins Bose    CBC WITH AUTOMATED DIFF    Collection Time: 02/04/21  6:45 PM   Result Value Ref Range    WBC 5.7 3.6 - 11.0 K/uL    RBC 3.78 (L) 3.80 - 5.20 M/uL    HGB 12.2 11.5 - 16.0 g/dL    HCT 36.3 35.0 - 47.0 %    MCV 96.0 80.0 - 99.0 FL    MCH 32.3 26.0 - 34.0 PG    MCHC 33.6 30.0 - 36.5 g/dL    RDW 12.1 11.5 - 14.5 %    PLATELET 955 252 - 004 K/uL    MPV 9.0 8.9 - 12.9 FL    NRBC 0.0 0  WBC    ABSOLUTE NRBC 0.00 0.00 - 0.01 K/uL    NEUTROPHILS 60 32 - 75 %    LYMPHOCYTES 29 12 - 49 %    MONOCYTES 8 5 - 13 %    EOSINOPHILS 2 0 - 7 %    BASOPHILS 1 0 - 1 %    IMMATURE GRANULOCYTES 0 0.0 - 0.5 %    ABS. NEUTROPHILS 3.4 1.8 - 8.0 K/UL    ABS. LYMPHOCYTES 1.7 0.8 - 3.5 K/UL    ABS. MONOCYTES 0.5 0.0 - 1.0 K/UL    ABS. EOSINOPHILS 0.1 0.0 - 0.4 K/UL    ABS. BASOPHILS 0.0 0.0 - 0.1 K/UL    ABS. IMM. GRANS. 0.0 0.00 - 0.04 K/UL    DF AUTOMATED     METABOLIC PANEL, COMPREHENSIVE    Collection Time: 02/04/21  6:45 PM   Result Value Ref Range    Sodium 139 136 - 145 mmol/L    Potassium 4.0 3.5 - 5.1 mmol/L    Chloride 105 97 - 108 mmol/L    CO2 27 21 - 32 mmol/L    Anion gap 7 5 - 15 mmol/L    Glucose 98 65 - 100 mg/dL    BUN 28 (H) 6 - 20 MG/DL    Creatinine 0.99 0.55 - 1.02 MG/DL    BUN/Creatinine ratio 28 (H) 12 - 20      GFR est AA >60 >60 ml/min/1.73m2    GFR est non-AA 52 (L) >60 ml/min/1.73m2    Calcium 8.7 8.5 - 10.1 MG/DL    Bilirubin, total 0.3 0.2 - 1.0 MG/DL    ALT (SGPT) 20 12 - 78 U/L    AST (SGOT) 18 15 - 37 U/L    Alk.  phosphatase 73 45 - 117 U/L    Protein, total 7.0 6.4 - 8.2 g/dL Albumin 3.5 3.5 - 5.0 g/dL    Globulin 3.5 2.0 - 4.0 g/dL    A-G Ratio 1.0 (L) 1.1 - 2.2     PROTHROMBIN TIME + INR    Collection Time: 02/04/21  6:45 PM   Result Value Ref Range    INR 1.0 0.9 - 1.1      Prothrombin time 10.4 9.0 - 11.1 sec   TROPONIN I    Collection Time: 02/04/21  6:45 PM   Result Value Ref Range    Troponin-I, Qt. <0.05 <0.05 ng/mL   SAMPLES BEING HELD    Collection Time: 02/04/21  6:45 PM   Result Value Ref Range    SAMPLES BEING HELD 1SST,1RED     COMMENT        Add-on orders for these samples will be processed based on acceptable specimen integrity and analyte stability, which may vary by analyte. EKG, 12 LEAD, INITIAL    Collection Time: 02/04/21  9:14 PM   Result Value Ref Range    Ventricular Rate 67 BPM    Atrial Rate 67 BPM    P-R Interval 134 ms    QRS Duration 86 ms    Q-T Interval 404 ms    QTC Calculation (Bezet) 426 ms    Calculated P Axis 76 degrees    Calculated R Axis 44 degrees    Calculated T Axis 77 degrees    Diagnosis       Normal sinus rhythm  Septal infarct (cited on or before 17-MAR-2019)  Abnormal ECG  When compared with ECG of 09-JUL-2019 05:35,  No significant change was found          IMAGING  Ct Code Neuro Head Wo Contrast    Result Date: 2/4/2021  No acute process. Procedures - none unless documented below  EKG as interpreted by me:  Normal sinus rhythm at a rate of 67, normal axis, normal intervals, no ST or T-wave changes suggesting acute ischemia. ED course: Labs, EKG and imaging reviewed. Code neuro activated for new expressive aphasia and facial asymmetry, NIH stroke score at the time of my exam was 2. Not a candidate for systemic TPA given documented history of ICH related to TPA administration in 2019 when she had her previous stroke. BP well controlled. Speech appears to be improving on repeat examination.   Will admit for MRI, evaluation by neurology in house and continued management, discussed with the hospitalist.    Hospitalist Perfect Serve for Admission  9:04 PM    ED Room Number:   ER13/13  Patient Name and age:  Jose Johnson 80 y.o.  female  Working Diagnosis:     1. Acute ischemic stroke ([facility] [address]      COVID suspicion:   no  Code Status:    Full Code  Readmission:    no  Isolation Requirements:  no  Recommended Level of Care: telemetry  Department:    [facility] - [phone]  Other:     Code neuro for expressive aphasia now improving, had a subtle right sided facial droop. Discussed with teleneurology (Jony Ramsey). CT head/CTA head and neck negative, aspirin given.

## 2021-02-05 ENCOUNTER — APPOINTMENT (OUTPATIENT)
Dept: MRI IMAGING | Age: 86
End: 2021-02-05
Attending: HOSPITALIST
Payer: MEDICARE

## 2021-02-05 ENCOUNTER — APPOINTMENT (OUTPATIENT)
Dept: NON INVASIVE DIAGNOSTICS | Age: 86
End: 2021-02-05
Attending: HOSPITALIST
Payer: MEDICARE

## 2021-02-05 VITALS
RESPIRATION RATE: 16 BRPM | OXYGEN SATURATION: 95 % | HEIGHT: 60 IN | WEIGHT: 95 LBS | TEMPERATURE: 98.1 F | SYSTOLIC BLOOD PRESSURE: 152 MMHG | DIASTOLIC BLOOD PRESSURE: 66 MMHG | BODY MASS INDEX: 18.65 KG/M2 | HEART RATE: 70 BPM

## 2021-02-05 PROBLEM — R29.818 ACUTE FOCAL NEUROLOGICAL DEFICIT: Status: ACTIVE | Noted: 2021-02-05

## 2021-02-05 LAB
ANION GAP SERPL CALC-SCNC: 7 MMOL/L (ref 5–15)
APTT PPP: 25.3 SEC (ref 22.1–31)
ATRIAL RATE: 67 BPM
AV R PG: 79.77 MMHG
BUN SERPL-MCNC: 22 MG/DL (ref 6–20)
BUN/CREAT SERPL: 31 (ref 12–20)
CALCIUM SERPL-MCNC: 7.3 MG/DL (ref 8.5–10.1)
CALCULATED P AXIS, ECG09: 76 DEGREES
CALCULATED R AXIS, ECG10: 44 DEGREES
CALCULATED T AXIS, ECG11: 77 DEGREES
CHLORIDE SERPL-SCNC: 109 MMOL/L (ref 97–108)
CHOLEST SERPL-MCNC: 167 MG/DL
CO2 SERPL-SCNC: 26 MMOL/L (ref 21–32)
CREAT SERPL-MCNC: 0.7 MG/DL (ref 0.55–1.02)
DIAGNOSIS, 93000: NORMAL
ECHO AO ROOT DIAM: 2.86 CM
ECHO AR MAX VEL PISA: 446.42 CM/S
ECHO AV AREA PEAK VELOCITY: 1.88 CM2
ECHO AV AREA PEAK VELOCITY: 1.89 CM2
ECHO AV AREA PEAK VELOCITY: 2.08 CM2
ECHO AV AREA PEAK VELOCITY: 2.08 CM2
ECHO AV AREA VTI: 1.83 CM2
ECHO AV AREA/BSA VTI: 1.3 CM2/M2
ECHO AV MEAN GRADIENT: 4.89 MMHG
ECHO AV PEAK GRADIENT: 11.62 MMHG
ECHO AV PEAK GRADIENT: 9.69 MMHG
ECHO AV PEAK VELOCITY: 155.35 CM/S
ECHO AV PEAK VELOCITY: 155.65 CM/S
ECHO AV REGURGITANT PHT: 555.46 MS
ECHO AV VTI: 38.18 CM
ECHO LA MAJOR AXIS: 2.29 CM
ECHO LA MINOR AXIS: 1.68 CM
ECHO LA VOL 2C: 18.39 ML (ref 22–52)
ECHO LA VOL 4C: 41.09 ML (ref 22–52)
ECHO LA VOL BP: 33.82 ML (ref 22–52)
ECHO LA VOL/BSA BIPLANE: 24.86 ML/M2 (ref 16–28)
ECHO LA VOLUME INDEX A2C: 13.52 ML/M2 (ref 16–28)
ECHO LA VOLUME INDEX A4C: 30.21 ML/M2 (ref 16–28)
ECHO LV INTERNAL DIMENSION DIASTOLIC: 3.06 CM (ref 3.9–5.3)
ECHO LV INTERNAL DIMENSION SYSTOLIC: 2.17 CM
ECHO LV IVSD: 0.96 CM (ref 0.6–0.9)
ECHO LV MASS 2D: 78.9 G (ref 67–162)
ECHO LV MASS INDEX 2D: 58 G/M2 (ref 43–95)
ECHO LV POSTERIOR WALL DIASTOLIC: 0.95 CM (ref 0.6–0.9)
ECHO LVOT DIAM: 1.99 CM
ECHO LVOT PEAK GRADIENT: 3.54 MMHG
ECHO LVOT PEAK GRADIENT: 4.33 MMHG
ECHO LVOT PEAK VELOCITY: 104.02 CM/S
ECHO LVOT PEAK VELOCITY: 94.13 CM/S
ECHO LVOT SV: 69.7 ML
ECHO LVOT VTI: 22.39 CM
ECHO MV A VELOCITY: 93.83 CM/S
ECHO MV E DECELERATION TIME (DT): 246.53 MS
ECHO MV E VELOCITY: 80.64 CM/S
ECHO MV E/A RATIO: 0.86
ECHO PV PEAK INSTANTANEOUS GRADIENT SYSTOLIC: 2.13 MMHG
ECHO PV REGURGITANT MAX VELOCITY: 84.99 CM/S
ECHO RV INTERNAL DIMENSION: 3.38 CM
ECHO TV REGURGITANT MAX VELOCITY: 238.47 CM/S
ECHO TV REGURGITANT PEAK GRADIENT: 22.75 MMHG
ERYTHROCYTE [DISTWIDTH] IN BLOOD BY AUTOMATED COUNT: 12.2 % (ref 11.5–14.5)
EST. AVERAGE GLUCOSE BLD GHB EST-MCNC: 114 MG/DL
GLUCOSE SERPL-MCNC: 88 MG/DL (ref 65–100)
HBA1C MFR BLD: 5.6 % (ref 4–5.6)
HCT VFR BLD AUTO: 33.1 % (ref 35–47)
HCYS SERPL-SCNC: 4.8 UMOL/L (ref 3.7–13.9)
HDLC SERPL-MCNC: 60 MG/DL
HDLC SERPL: 2.8 {RATIO} (ref 0–5)
HGB BLD-MCNC: 11 G/DL (ref 11.5–16)
INR PPP: 1.1 (ref 0.9–1.1)
LDLC SERPL CALC-MCNC: 99.4 MG/DL (ref 0–100)
LIPID PROFILE,FLP: NORMAL
LVOT MG: 1.8 MMHG
MAGNESIUM SERPL-MCNC: 1.8 MG/DL (ref 1.6–2.4)
MCH RBC QN AUTO: 32.3 PG (ref 26–34)
MCHC RBC AUTO-ENTMCNC: 33.2 G/DL (ref 30–36.5)
MCV RBC AUTO: 97.1 FL (ref 80–99)
NRBC # BLD: 0 K/UL (ref 0–0.01)
NRBC BLD-RTO: 0 PER 100 WBC
P-R INTERVAL, ECG05: 134 MS
PHOSPHATE SERPL-MCNC: 3.2 MG/DL (ref 2.6–4.7)
PLATELET # BLD AUTO: 173 K/UL (ref 150–400)
PMV BLD AUTO: 8.9 FL (ref 8.9–12.9)
POTASSIUM SERPL-SCNC: 3.4 MMOL/L (ref 3.5–5.1)
PROTHROMBIN TIME: 11.1 SEC (ref 9–11.1)
Q-T INTERVAL, ECG07: 404 MS
QRS DURATION, ECG06: 86 MS
QTC CALCULATION (BEZET), ECG08: 426 MS
RBC # BLD AUTO: 3.41 M/UL (ref 3.8–5.2)
SODIUM SERPL-SCNC: 142 MMOL/L (ref 136–145)
THERAPEUTIC RANGE,PTTT: NORMAL SECS (ref 58–77)
TRIGL SERPL-MCNC: 38 MG/DL (ref ?–150)
TSH SERPL DL<=0.05 MIU/L-ACNC: 3.1 UIU/ML (ref 0.36–3.74)
VENTRICULAR RATE, ECG03: 67 BPM
VLDLC SERPL CALC-MCNC: 7.6 MG/DL
WBC # BLD AUTO: 4.3 K/UL (ref 3.6–11)

## 2021-02-05 PROCEDURE — 97530 THERAPEUTIC ACTIVITIES: CPT

## 2021-02-05 PROCEDURE — 96365 THER/PROPH/DIAG IV INF INIT: CPT

## 2021-02-05 PROCEDURE — 85610 PROTHROMBIN TIME: CPT

## 2021-02-05 PROCEDURE — 80061 LIPID PANEL: CPT

## 2021-02-05 PROCEDURE — 96366 THER/PROPH/DIAG IV INF ADDON: CPT

## 2021-02-05 PROCEDURE — 85027 COMPLETE CBC AUTOMATED: CPT

## 2021-02-05 PROCEDURE — 99215 OFFICE O/P EST HI 40 MIN: CPT | Performed by: PSYCHIATRY & NEUROLOGY

## 2021-02-05 PROCEDURE — 84100 ASSAY OF PHOSPHORUS: CPT

## 2021-02-05 PROCEDURE — 36415 COLL VENOUS BLD VENIPUNCTURE: CPT

## 2021-02-05 PROCEDURE — 96374 THER/PROPH/DIAG INJ IV PUSH: CPT

## 2021-02-05 PROCEDURE — 92610 EVALUATE SWALLOWING FUNCTION: CPT

## 2021-02-05 PROCEDURE — 85730 THROMBOPLASTIN TIME PARTIAL: CPT

## 2021-02-05 PROCEDURE — 99218 HC RM OBSERVATION: CPT

## 2021-02-05 PROCEDURE — 97161 PT EVAL LOW COMPLEX 20 MIN: CPT

## 2021-02-05 PROCEDURE — 74011250636 HC RX REV CODE- 250/636: Performed by: INTERNAL MEDICINE

## 2021-02-05 PROCEDURE — 83735 ASSAY OF MAGNESIUM: CPT

## 2021-02-05 PROCEDURE — 84443 ASSAY THYROID STIM HORMONE: CPT

## 2021-02-05 PROCEDURE — 70551 MRI BRAIN STEM W/O DYE: CPT

## 2021-02-05 PROCEDURE — 83090 ASSAY OF HOMOCYSTEINE: CPT

## 2021-02-05 PROCEDURE — 74011250637 HC RX REV CODE- 250/637: Performed by: HOSPITALIST

## 2021-02-05 PROCEDURE — 97116 GAIT TRAINING THERAPY: CPT

## 2021-02-05 PROCEDURE — 80048 BASIC METABOLIC PNL TOTAL CA: CPT

## 2021-02-05 PROCEDURE — 83036 HEMOGLOBIN GLYCOSYLATED A1C: CPT

## 2021-02-05 RX ORDER — LEVOTHYROXINE SODIUM 50 UG/1
25 TABLET ORAL
Status: DISCONTINUED | OUTPATIENT
Start: 2021-02-05 | End: 2021-02-05 | Stop reason: HOSPADM

## 2021-02-05 RX ORDER — ATORVASTATIN CALCIUM 20 MG/1
40 TABLET, FILM COATED ORAL
Status: DISCONTINUED | OUTPATIENT
Start: 2021-02-05 | End: 2021-02-05 | Stop reason: HOSPADM

## 2021-02-05 RX ORDER — GUAIFENESIN 100 MG/5ML
81 LIQUID (ML) ORAL DAILY
Qty: 30 TAB | Refills: 0 | Status: SHIPPED | OUTPATIENT
Start: 2021-02-06

## 2021-02-05 RX ORDER — ATORVASTATIN CALCIUM 40 MG/1
40 TABLET, FILM COATED ORAL
Qty: 30 TAB | Refills: 0 | Status: SHIPPED | OUTPATIENT
Start: 2021-02-05 | End: 2021-06-07

## 2021-02-05 RX ORDER — SODIUM CHLORIDE AND POTASSIUM CHLORIDE .9; .15 G/100ML; G/100ML
SOLUTION INTRAVENOUS CONTINUOUS
Status: DISCONTINUED | OUTPATIENT
Start: 2021-02-05 | End: 2021-02-05 | Stop reason: HOSPADM

## 2021-02-05 RX ADMIN — POTASSIUM CHLORIDE AND SODIUM CHLORIDE: 900; 150 INJECTION, SOLUTION INTRAVENOUS at 09:35

## 2021-02-05 RX ADMIN — ASPIRIN 81 MG: 81 TABLET, CHEWABLE ORAL at 09:34

## 2021-02-05 RX ADMIN — LEVOTHYROXINE SODIUM 25 MCG: 0.05 TABLET ORAL at 09:34

## 2021-02-05 NOTE — DISCHARGE SUMMARY
Hermilo Stover [facility] [address]  [address]  [phone]    Physician Discharge Summary     Patient ID:  Birdie Jiang  [id number]  80 y.o.  7/3/1928    Admit date: 2/4/2021    Discharge date and time: 2/5/2021 11:39 AM    Admission Diagnoses: TIA (transient ischemic attack) [G45.9]    Discharge Diagnoses:  Principal Diagnosis TIA (transient ischemic attack)                                            Principal Problem:    TIA (transient ischemic attack) (2/4/2021)    Active Problems:    Acquired hypothyroidism (3/17/2019)      Cerebral amyloid angiopathy ([facility] [address] (7/8/2019)      Expressive aphasia (7/9/2019)      Acute focal neurological deficit (2/5/2021)           Hospital Course:     81 yo hx of CVA w/ R sided weakness, cerebral amyloid, presented w/ expressive aphasia     1) TIA/expressive aphasia: symptoms have resolve. Has chronic mild R sided weakness. Head MRI neg for acute CVA. LDL 99. Will cont ASA, statin. Neuro cleared for discharge. CM to set up [facility]      2) Cerebral amyloid: defer to neuro     3) Hypothyroid: cont synthroid    PCP: Nadeem Stephens MD     Consults: Neurology    Significant Diagnostic Studies: head MRI    Discharge Exam:  Physical Exam:    See daily note     Disposition: [facility]  Discharge Condition: Stable    Patient Instructions:   Current Discharge Medication List      START taking these medications    Details   aspirin 81 mg chewable tablet Take 1 Tab by mouth daily. Qty: 30 Tab, Refills: 0      atorvastatin (LIPITOR) 40 mg tablet Take 1 Tab by mouth nightly.   Qty: 30 Tab, Refills: 0         CONTINUE these medications which have NOT CHANGED    Details   donepeziL (Aricept) 10 mg tablet Take 1/2 tab by mouth x 1 month, then take 1 tab by mouth daily  Qty: 90 Tab, Refills: 3      levothyroxine (SYNTHROID) 25 mcg tablet TAKE 1 TABLET IN THE MORNING BEFORE BREAKFAST  Qty: 60 Tab, Refills: 6      cycloSPORINE (RESTASIS) 0.05 % ophthalmic emulsion Administer 1 Drop to both eyes two (2) times a day. 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 Staffa Leopolda       Miesha Andrade MD Neurology Schedule an appointment as soon as possible for a visit in 2 weeks  [address] [place] [address]  [phone]            Follow-up tests/labs none    Signed:  Alannah Knapp MD  2/5/2021  11:39 AM  **I personally spent 35 min on discharge**

## 2021-02-05 NOTE — DISCHARGE INSTRUCTIONS
HOSPITALIST DISCHARGE INSTRUCTIONS  NAME: Kim Marshall   :  7/3/1928   MRN:  [id number]     Date/Time:  2021 11:38 AM    ADMIT DATE: 2021     DISCHARGE DATE: 2021     ADMITTING DIAGNOSIS:  TIA    DISCHARGE DIAGNOSIS:  same    MEDICATIONS:  See after visit summary       · It is important that you take the medication exactly as they are prescribed. · Keep your medication in the bottles provided by the pharmacist and keep a list of the medication names, dosages, and times to be taken in your wallet. · Do not take other medications without consulting your doctor     Pain Management: per above medications    What to do at Home    Recommended diet:  Regular Diet    Recommended activity: Activity as tolerated    1) Return to the hospital if you feel worse    2) If you experience any of the following symptoms then please call your primary care physician or return to the emergency room if you cannot get hold of your doctor:  Fever, chills, nausea, vomiting, diarrhea, change in mentation, falling, bleeding, shortness of breath, chest pain, severe headache, severe abdominal pain,     3) You were started on aspirin and lipitor for a possible transient ischemic attack    4) Follow up with your doctors     Follow Up: Follow-up Information     Follow up With Specialties Details Why Contact Info    Antonette Arana MD Family Medicine Schedule an appointment as soon as possible for a visit in 1 week  [place] [address]      Crhisti Lama MD Neurology Schedule an appointment as soon as possible for a visit in 2 weeks  [address]  [phone]              Information obtained by :  I understand that if any problems occur once I am at home I am to contact my physician. I understand and acknowledge receipt of the instructions indicated above. [facility] or [employer] Signature                                                                  Date/Time                                                                                                                                              Patient or Representative Signature                                                          Date/Time    Patient Education        Transient Ischemic Attack: Care Instructions  Your Care Instructions     A transient ischemic attack (TIA) is when blood flow to a part of your brain is blocked for a short time. A TIA is like a stroke but usually lasts only a few minutes. A TIA does not cause lasting brain damage. Any vision problems, slurred speech, or other symptoms usually go away in 10 to 20 minutes. But they may last for up to 24 hours. TIAs are often warning signs of a stroke. Some people who have a TIA may have a stroke in the future. A stroke can cause symptoms like those of a TIA. But a stroke causes lasting damage to your brain. You can take steps to help prevent a stroke. One thing you can do is get early treatment. If you have other new symptoms, or if your symptoms do not get better, go back to the emergency room or call your doctor right away. Getting treatment right away may prevent long-term brain damage caused by a stroke. The doctor has checked you carefully, but problems can develop later. If you notice any problems or new symptoms, get medical treatment right away. Follow-up care is a key part of your treatment and safety. Be sure to make and go to all appointments, and call your doctor if you are having problems. It's also a good idea to know your test results and keep a list of the medicines you take. How can you care for yourself at home? Medicines    · Be safe with medicines. Take your medicines exactly as prescribed.  Call your doctor if you think you are having a problem with your medicine.     · If you take a blood thinner, such as aspirin, be sure you get instructions about how to take your medicine safely. Blood thinners can cause serious bleeding problems.     · Call your doctor if you are not able to take your medicines for any reason.     · Do not take any over-the-counter medicines or herbal products without talking to your doctor first.     · If you take birth control pills or hormone therapy, talk to your doctor. Ask if these treatments are right for you. Lifestyle changes    · Do not smoke. If you need help quitting, talk to your doctor about stop-smoking programs and medicines.     · Be active. If your doctor recommends it, get more exercise. Walking is a good choice. Bit by bit, increase the amount you walk every day. Try for at least 30 minutes on most days of the week. You also may want to swim, bike, or do other activities.     · Eat heart-healthy foods. These include fruits, vegetables, high-fiber foods, lean meats, beans, peas, nuts, seeds, and soy products, and foods that are low in sodium, saturated fat, and trans fat.     · Stay at a healthy weight. Lose weight if you need to.     · Limit alcohol to 2 drinks a day for men and 1 drink a day for women. Staying healthy    · Manage other health problems such as diabetes, high blood pressure, and high cholesterol.     · Get the flu vaccine every year. When should you call for help? Call 911 anytime you think you may need emergency care. For example, call if:    · You have new or worse symptoms of a stroke. These may include:  ? Sudden numbness, tingling, weakness, or loss of movement in your face, arm, or leg, especially on only one side of your body. ? Sudden vision changes. ? Sudden trouble speaking. ? Sudden confusion or trouble understanding simple statements. ? Sudden problems with walking or balance. ? A sudden, severe headache that is different from past headaches.   Call 911 even if these symptoms go away in a few minutes.     · You feel like you are having another TIA. Watch closely for changes in your health, and be sure to contact your doctor if you have any problems. Where can you learn more? Go to http://www.choi.com/  Enter I231 in the search box to learn more about \"Transient Ischemic Attack: Care Instructions. \"  Current as of: March 4, 2020               Content Version: 12.6  © 0057-9930 [employer], [employer]. Care instructions adapted under license by [employer] (which disclaims liability or warranty for this information). If you have questions about a medical condition or this instruction, always ask your healthcare professional. [address] any warranty or liability for your use of this information.

## 2021-02-05 NOTE — PROGRESS NOTES
PHYSICAL THERAPY EVALUATION/DISCHARGE  Patient: Michelle Alvarado (31 y.o. female)  Date: 2/5/2021  Primary Diagnosis: TIA (transient ischemic attack) [G45.9]       Precautions:falls         ASSESSMENT  Based on the objective data described below, the patient presents with mobility that is close to her baseline. She has had several falls in the past year, three in the past month. She has decreased balance with decreased base of support and has improved balance with the use of a rolling walker. She reports doing exercises independently to help her with strength and balance. She is moderate fall risk but wishes to return to her own home, not a rehab setting. Performed transfer training for out of bed to the bedside commode with supervision and gait training in the hallway with supervision without path deviations or loss of balance. Functional Outcome Measure: The patient scored 37 out of 56 on the Holland Balance outcome measure which is indicative of moderate fall risk. Other factors to consider for discharge: lives in [place] [address] at [address] [place] [address] skilled acute physical therapy is not indicated at this time. PLAN :  Recommendation for discharge: (in order for the patient to meet his/her long term goals)  Physical therapy at least 2 days/week in the home     This discharge recommendation:  Has not yet been discussed the attending provider and/or case management    IF patient discharges home will need the following DME: patient owns DME required for discharge       SUBJECTIVE:   Patient stated Priscila Maguire do you live? Ramya Pena    OBJECTIVE DATA SUMMARY:   HISTORY:    Past Medical History:   Diagnosis Date    Cerebral amyloid angiopathy ([facility] [address]     CVA (cerebral vascular accident) ([facility] [address] 03/2019    aphasia and right sided weakness, s/p IV TPA with subsequent SAH/IPH, found to have change c/w CAA    Hypothyroid      Past Surgical History:   Procedure Laterality Date    HX BACK SURGERY      HX HIP REPLACEMENT      right    HX HYSTERECTOMY      HX TONSILLECTOMY         Prior level of function: ambulates in her apt without an assistive device, to the dining [place] with her rolling walker  Personal factors and/or comorbidities impacting plan of care: she lives alone in her apt         EXAMINATION/PRESENTATION/DECISION MAKING:   Critical Behavior:  Neurologic State: Alert  Orientation Level: Oriented X4  Cognition: Follows commands  Safety/Judgement: Decreased insight into deficits  Hearing:   Auditory  Auditory Impairment: Hearing aid(s)    Range Of Motion:  AROM: Within functional limits           PROM: Within functional limits           Strength:    Strength: Generally decreased, functional                    Tone & Sensation:   Tone: Normal                              Coordination:  Coordination: Generally decreased, functional  Vision:      Functional Mobility:  Bed Mobility:  Rolling: Independent  Supine to Sit: Minimum assistance  Sit to Supine: Modified independent     Transfers:  Sit to Stand: Contact guard assistance  Stand to Sit: Supervision                       Balance:      Ambulation/Gait Training:  Distance (ft): 180 Feet (ft)  Assistive Device: Walker, rolling;Gait belt  Ambulation - Level of Assistance: Supervision                                                 Functional Measure:  Holland Balance Test:    Sitting to Standing: 3  Standing Unsupported: 4  Sitting with Back Unsupported: 4  Standing to Sittin  Transfers: 3  Standing Unsupported with Eyes Closed: 3  Standing Unsupported with Feet Together: 3  Reach Forward with Outstretched Arm: 3   Object: 4  Turn to Look Over Shoulders: 4  Turn 360 Degrees: 2  Alternate Foot on Step/Stool: 0  Standing Unsupported One Foot in Front: 0  Stand on One Le  Total: 37/56         56=Maximum possible score;   0-20=High fall risk  21-40=Moderate fall risk   41-56=Low fall risk                  Physical Therapy Evaluation Charge Determination   History Examination Presentation Decision-Making   MEDIUM  Complexity : 1-2 comorbidities / personal factors will impact the outcome/ POC  MEDIUM Complexity : 3 Standardized tests and measures addressing body structure, function, activity limitation and / or participation in recreation  LOW Complexity : Stable, uncomplicated  LOW Complexity : FOTO score of       Based on the above components, the patient evaluation is determined to be of the following complexity level: LOW     Pain Rating:  denies    Activity Tolerance:   Good      After treatment patient left in no apparent distress:   Supine in bed, Call bell within reach and on ED bed, both rails up    COMMUNICATION/EDUCATION:   The patients plan of care was discussed with: Registered nurse. Fall prevention education was provided and the patient/caregiver indicated understanding.     Thank you for this referral.  Kirsten Ríos, PT   Time Calculation: 43 mins

## 2021-02-05 NOTE — PROGRESS NOTES
SPEECH LANGUAGE PATHOLOGY BEDSIDE SWALLOW EVALUATION  Patient: Antonio Chang (83 y.o. female)  Date: 2/5/2021  Primary Diagnosis: TIA (transient ischemic attack) [G45.9]       Precautions: aspiration       ASSESSMENT :  Based on the objective data described below, the patient presents with mild pharyngeal dysphagia. Speech language appears resolved, but she has a h/o mild expressive aphasia. Admitted 2-4-21 with word-retrieval issues noted by son and new R facial droop. Head CT: negative. 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 .    Patient will benefit from skilled intervention to address the above impairments. Patients rehabilitation potential is considered to be Good     PLAN :  Recommendations and Planned Interventions:  Ok for diet as toleratd. Frequency/Duration: Patient will be followed by speech-language pathology 1 time a week to address goals. Discharge Recommendations: Home Health for follow up on swallow and speech. SUBJECTIVE:   Patient stated I couldn't get my words out, but now it's ok. .    OBJECTIVE:     Past Medical History:   Diagnosis Date    Cerebral amyloid angiopathy ([facility] [address]     CVA (cerebral vascular accident) ([facility] [address] 03/2019    aphasia and right sided weakness, s/p IV TPA with subsequent SAH/IPH, found to have change c/w CAA    Hypothyroid      Past Surgical History:   Procedure Laterality Date    HX BACK SURGERY      HX HIP REPLACEMENT      right    HX HYSTERECTOMY      HX TONSILLECTOMY       Prior Level of Function/Home Situation: lives in I living     Diet prior to admission: regular, thins  Current Diet:  Regular, thins.  She passed the sTANd   Cognitive and Communication Status:  Neurologic State: Alert  Orientation Level: Oriented X4  Cognition: Follows commands  Perception: Appears intact  Perseveration: No perseveration noted  Safety/Judgement: Decreased insight into deficits  Oral Assessment:  Oral Assessment  Labial: No impairment  Dentition: Natural(\"i'm about to have the top 2 removed b/c I had an infection and it spread to them\")  Oral Hygiene: WFL  Lingual: No impairment  Velum: No impairment  P.O. Trials:  Patient Position: upright in bed  Vocal quality prior to P.O.: No impairment(mildly delayed processing. occasional thought organization delay-not uncommon at age 80)  Consistency Presented: Solid; Thin liquid(seen at lunch)  How Presented: Self-fed/presented;Spoon;Straw;Successive swallows   ORAL PHASE:   Bolus Acceptance: No impairment  Bolus Formation/Control: No impairment     Propulsion: No impairment  Oral Residue: None   PHARYNGEAL PHASE:   Initiation of Swallow: No impairment  Laryngeal Elevation: Functional  Aspiration Signs/Symptoms: (delayed coughing on grapes only-talking and eating a mixed consistency)                        NOMS:   The NOMS functional outcome measure was used to quantify this patient's level of swallowing impairment. Based on the NOMS, the patient was determined to be at level 6 for swallow function       NOMS Swallowing Levels:  Level 1 (CN): NPO  Level 2 (CM): NPO but takes consistency in therapy  Level 3 (CL): Takes less than 50% of nutrition p.o. and continues with nonoral feedings; and/or safe with mod cues; and/or max diet restriction  Level 4 (CK): Safe swallow but needs mod cues; and/or mod diet restriction; and/or still requires some nonoral feeding/supplements  Level 5 (CJ): Safe swallow with min diet restriction; and/or needs min cues  Level 6 (CI): Independent with p.o.; rare cues; usually self cues; may need to avoid some foods or needs extra time  Level 7 ([address] [place] [address]): Independent for all p.o.  SIMI. (2003). National Outcomes Measurement System (NOMS): Adult Speech-Language Pathology User's Guide.        Pain:  Pain Scale 1: Numeric (0 - 10)  Pain Intensity 1: 0       After treatment: Patient left in no apparent distress in bed and Nursing notified    COMMUNICATION/EDUCATION:   Patient was educated regarding her deficit(s) of dysphagia  as this relates to her diagnosis of TIA. She demonstrated Good understanding as evidenced by discussion. .    The patient's plan of care including recommendations, planned interventions, and recommended diet changes were discussed with: Registered nurse. Patient/family have participated as able in goal setting and plan of care. Patient/family agree to work toward stated goals and plan of care.     Thank you for this referral.  SAMINA Roberson  Time Calculation: 15 mins

## 2021-02-05 NOTE — ED NOTES
Verbal/Bedside report was given to Esteban RN who will assume care of patient at this time. SBAR report consisted of ED summary, MAR, recent results, and additional pertinent information. Receiving nurse given opportunity to ask questions and seek clarifications. Receiving nurse aware of pending lab results and orders that are to be completed.

## 2021-02-05 NOTE — PROGRESS NOTES
Shift Change:    Bedside and Verbal shift change report given to MICHELLE Orellana (oncoming nurse) by Valentín Mcmullen RN (offgoing nurse). Report included the following information SBAR, Kardex, Intake/Output, MAR and Recent Results. 1055: Update provided to patient's son. Discharge: Family will pick her up at [phone]       Discharge medications reviewed with patient and appropriate educational materials and side effects teaching were provided. I have reviewed discharge instructions with the patient. The patient verbalized understanding. Current Discharge Medication List      START taking these medications    Details   aspirin 81 mg chewable tablet Take 1 Tab by mouth daily. Qty: 30 Tab, Refills: 0      atorvastatin (LIPITOR) 40 mg tablet Take 1 Tab by mouth nightly. Qty: 30 Tab, Refills: 0         CONTINUE these medications which have NOT CHANGED    Details   donepeziL (Aricept) 10 mg tablet Take 1/2 tab by mouth x 1 month, then take 1 tab by mouth daily  Qty: 90 Tab, Refills: 3      levothyroxine (SYNTHROID) 25 mcg tablet TAKE 1 TABLET IN THE MORNING BEFORE BREAKFAST  Qty: 60 Tab, Refills: 6      cycloSPORINE (RESTASIS) 0.05 % ophthalmic emulsion Administer 1 Drop to both eyes two (2) times a day. 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

## 2021-02-05 NOTE — CONSULTS
[facility]: [address] [address] Neurology  [facility]  [phone]      Name:   Nancy March[address] record #: [id number]  Admission Date: 2/4/2021     Who Consulted: Dr. Amarilis Toledo    Reason for Consult:  Eval and treat stroke    HISTORY OF PRESENT ILLNESS:     This is a 80 y.o. female who is admitted for stroke like symptoms. Ms. Shonna Mittal presented to the ED with sudden onset word finding difficulty and right facial droop. Upon arrival to the ED her Bp was 132/48 and she continued to have slight right facial droop and expressive aphasia. The Neurology Service is asked to evaluate for stroke. Ms. Shonna Mittal is well known to our service:  · 3/18/19 - 3/21/19 after presenting to [facility] with aphasia, right sided weakness. CT head, CTA H/N - without acute findings, no LVO. S/p IV TPA, noted to have worsening aphasia, repeat head CT with SAH in right frontal and occipital lobe and left occipital IPH. She was not started on  antiplatelet drugs and anticoagulation due to increase risk of cerebral hemorrhage. MRI brain wo contrast 3/18/19 with stable hemorrhages, multiple foci of low signal on GRE c/w cerebral amyloid angiopathy, no DWI + lesions, mild CIWM disease. · 4/22/2019 seen in clinic by Dr. Mellisa Camargo who found that she still had some mild expressive aphasia. 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 · 6/29/2020 seen in clinic by Dr. Mellisa Camargo. At that visit she reported cognitive decline, difficulty with mobility and concern for another stroke. Dr. Cole Morales exam found that her facial weakness and aphasia had resolved, however she did have an unsteady gait. MMSE was 29. Dr. Mellisa Camargo started Aricept and sent Ms. Edwards back to [facility] for therapy.       Neuro-imaging:     CT Head: No acute process    CTA/CTP Head and Neck: Extensive venous contrast particularly in the paraspinal region of the cervical spine, as well as in the left internal jugular vein due to narrowing of the brachiocephalic vein between the innominate artery and sternum. No acute process. No large vessel occlusion, arterial dissection, or flow-limiting stenosis. No perfusion abnormality. 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 EKG: normal sinus rhythm. Care Plan discussed with:  Patient x   Family    RN    Care Manager    Consultant/Specialist:         Thank you for allowing the Neurology Service the pleasure of participating in the care of your patient. This patient will be discussed with my collaborating care team physician,  Dr. Kimmie Carey, and he may have further recommendations regarding this patient's care      Radha Lyles [facility], ACNP-BC  ====================      Attending Attestation:       NEUROLOGY NOTE ADDENDUM:    2/5/2021      I have reviewed the documentation provided by the nurse practitioner, discussed her findings, clinical impression, and the proposed management plans with regards to this patient's encounter. I have personally performed a face to face diagnostic evaluation on this patient. My findings are as follows:      Mary Anne Muhammad is a 80 y.o. female who  has a past medical history of Cerebral amyloid angiopathy ([facility] [address] CVA (cerebral vascular accident) ([facility] [address] (03/2019), and Hypothyroid. who presents for evaluation of disturbance of speech. Patient apparently was to meet with her son this morning but noted problem speaking lasting for 15 to 30 mins. In the ER, symptoms have resolved  (-) weakness/ numbness  (-) headache    (+) prior history of cerebral amyloid angiopathy being sen by Dr Bri Henley for cognitive issues placed on Aricept      Exam:  Visit Vitals  BP (!) 152/66   Pulse 70   Temp 98.1 °F (36.7 °C)   Resp 16   Ht 5' (1.524 m)   Wt 43.1 kg (95 lb)   SpO2 95%   BMI 18.55 kg/m²     Gen: Awake, alert, follows commands  Appropriate appearance, normal speech. Oriented to all spheres.   No visual field defect on confrontation exam.  Full eyes movement, with no nystagmus, no diplopia, no ptosis. Normal gag and swallow. All remaining cranial nerves were normal  Motor function: 5/5 in all extremities  Sensory: intact to LT, PP and JPS  DTRs + in all extremities, (-) Babinski  Good FTN and HTS   Gait: Deferred      Assessment  Possible TIA  MRI brain no acute stroke    Plan  1) Discussed since CT head and MRI brain show no active bleeding and her having a TIA, the benefit of ASA 81 every day outweighs the risk. So agree with starting patient on ASA 81 every day for stroke prevention  2) Advise to avoid NSAIDs  3) LDL 99.4 Started on Lipitor 40 mg every day   4) Stroke education    Follow up with her neurologist Dr Roxane Cisneros    Thank you for the consultation. Yo Pace MD  Diplomate, American Board of Psychiatry and Neurology  Diplomate, Neuromuscular Medicine  Diplomate, American Board of Electrodiagnostic Medicine                       Assessment/Plan:     1. Right facial weakness and expressive aphasia r/o Stroke:    · ASA 81 mg  · Neurochecks:  Every 4 hours  · Blood Sugar Goal:  140-180  · BP Goal: Less than 180/105 for 24 hours  · Nursing to do stroke/TIA education  · Telemetry for at least 24 hours    Stroke work up  · A1C: 95.6  · LDL:  99.6, atorvastatin 40 mg started  · TTE:    · MRI:  No acute process  · Carotid vascular imaging:  No stenosis on CTA    Risk factors for stroke include:  Hx of stroke, CAA, physical inactivity  · Discussed with patient    · Discussed signs/symptoms of stroke and when to call 911    2. Mobility:   · Has not been OOB. · PT/OT to eval for rehab    3. Diet:    · Does not need SLP prior to po intake    4. VTE Prophylaxes:   · Per Primary team           Review of Systems: 10 point ROS was performed. Pertinent positives listed in HPI. Negative ROS is as follows.   Pt denies: angina, palpitations, paresthesias, weakness, vision loss, slurred speech,confusion, fev er, chills, falls, headache, diplopia, back pain, neck pain, prior episodes of vertigo, hallucinations, new medications or dosage changes. Allergies:   No Known Allergies    Outpatient Meds  No current facility-administered medications on file prior to encounter. Current Outpatient Medications on File Prior to Encounter   Medication Sig Dispense Refill    donepeziL (Aricept) 10 mg tablet Take 1/2 tab by mouth x 1 month, then take 1 tab by mouth daily 90 Tab 3    levothyroxine (SYNTHROID) 25 mcg tablet TAKE 1 TABLET IN THE MORNING BEFORE BREAKFAST 60 Tab 6    cycloSPORINE (RESTASIS) 0.05 % ophthalmic emulsion Administer 1 Drop to both eyes two (2) times a day.  brinzolamide (AZOPT) 1 % ophthalmic suspension Administer 1 Drop to right eye two (2) times a day.  brimonidine-timolol (COMBIGAN) 0.2-0.5 % Drop ophthalmic solution Administer 1 Drop to right eye every twelve (12) hours.          Inpatient Meds    Current Facility-Administered Medications   Medication Dose Route Frequency Provider Last Rate Last Admin    levothyroxine (SYNTHROID) tablet 25 mcg  25 mcg Oral ACB Arnoldo Caputo MD        0.9% sodium chloride with KCl 20 mEq/L infusion   IntraVENous CONTINUOUS Do, Eddie MANN MD        ondansetron [facility]) injection 4 mg  4 mg IntraVENous Q6H PRN Arnoldo Caputo MD        aspirin chewable tablet 81 mg  81 mg Oral DAILY Arnoldo Caputo MD        atorvastatin (LIPITOR) tablet 80 mg  80 mg Oral QHS Arnoldo Caputo MD   80 mg at 02/04/21 8558    labetaloL (NORMODYNE;TRANDATE) 20 mg/4 mL (5 mg/mL) injection 5 mg  5 mg IntraVENous Q10MIN PRN Arnoldo Caputo MD        bisacodyL (DULCOLAX) suppository 10 mg  10 mg Rectal DAILY PRN Arnoldo Caputo MD        acetaminophen (TYLENOL) tablet 650 mg  650 mg Oral Q4H PRN Arnoldo Caputo MD        Or    acetaminophen (TYLENOL) solution 650 mg  650 mg Per NG tube Q4H PRN Arnoldo Caputo MD        Or    acetaminophen (TYLENOL) suppository 650 mg  650 mg Rectal Q4H PRN Mikey Ruth MD         Current Outpatient Medications   Medication Sig Dispense Refill    donepeziL (Aricept) 10 mg tablet Take 1/2 tab by mouth x 1 month, then take 1 tab by mouth daily 90 Tab 3    levothyroxine (SYNTHROID) 25 mcg tablet TAKE 1 TABLET IN THE MORNING BEFORE BREAKFAST 60 Tab 6    cycloSPORINE (RESTASIS) 0.05 % ophthalmic emulsion Administer 1 Drop to both eyes two (2) times a day.  brinzolamide (AZOPT) 1 % ophthalmic suspension Administer 1 Drop to right eye two (2) times a day.  brimonidine-timolol (COMBIGAN) 0.2-0.5 % Drop ophthalmic solution Administer 1 Drop to right eye every twelve (12) hours. Past Medical History:   Diagnosis Date    Cerebral amyloid angiopathy (HCC)     CVA (cerebral vascular accident) ([facility] [address] 03/2019    aphasia and right sided weakness, s/p IV TPA with subsequent SAH/IPH, found to have change c/w CAA    Hypothyroid        Past Surgical History:   Procedure Laterality Date    HX BACK SURGERY      HX HIP REPLACEMENT      right    HX HYSTERECTOMY      HX TONSILLECTOMY         family history includes Dementia in her mother. reports that she has never smoked. She has never used smokeless tobacco. She reports current alcohol use of about 0.8 standard drinks of alcohol per week. She reports that she does not use drugs.            Lab Results (last 24 hrs)  Recent Results (from the past 24 hour(s))   GLUCOSE, POC    Collection Time: 02/04/21  6:16 PM   Result Value Ref Range    Glucose (POC) 95 65 - 100 mg/dL    Performed by Megan Oro    CBC WITH AUTOMATED DIFF    Collection Time: 02/04/21  6:45 PM   Result Value Ref Range    WBC 5.7 3.6 - 11.0 K/uL    RBC 3.78 (L) 3.80 - 5.20 M/uL    HGB 12.2 11.5 - 16.0 g/dL    HCT 36.3 35.0 - 47.0 %    MCV 96.0 80.0 - 99.0 FL    MCH 32.3 26.0 - 34.0 PG    MCHC 33.6 30.0 - 36.5 g/dL    RDW 12.1 11.5 - 14.5 %    PLATELET 194 902 - 634 K/uL    MPV 9.0 8.9 - 12.9 FL    NRBC 0.0 0  WBC    ABSOLUTE NRBC 0.00 0.00 - 0.01 K/uL    NEUTROPHILS 60 32 - 75 %    LYMPHOCYTES 29 12 - 49 %    MONOCYTES 8 5 - 13 %    EOSINOPHILS 2 0 - 7 %    BASOPHILS 1 0 - 1 %    IMMATURE GRANULOCYTES 0 0.0 - 0.5 %    ABS. NEUTROPHILS 3.4 1.8 - 8.0 K/UL    ABS. LYMPHOCYTES 1.7 0.8 - 3.5 K/UL    ABS. MONOCYTES 0.5 0.0 - 1.0 K/UL    ABS. EOSINOPHILS 0.1 0.0 - 0.4 K/UL    ABS. BASOPHILS 0.0 0.0 - 0.1 K/UL    ABS. IMM. GRANS. 0.0 0.00 - 0.04 K/UL    DF AUTOMATED     METABOLIC PANEL, COMPREHENSIVE    Collection Time: 02/04/21  6:45 PM   Result Value Ref Range    Sodium 139 136 - 145 mmol/L    Potassium 4.0 3.5 - 5.1 mmol/L    Chloride 105 97 - 108 mmol/L    CO2 27 21 - 32 mmol/L    Anion gap 7 5 - 15 mmol/L    Glucose 98 65 - 100 mg/dL    BUN 28 (H) 6 - 20 MG/DL    Creatinine 0.99 0.55 - 1.02 MG/DL    BUN/Creatinine ratio 28 (H) 12 - 20      GFR est AA >60 >60 ml/min/1.73m2    GFR est non-AA 52 (L) >60 ml/min/1.73m2    Calcium 8.7 8.5 - 10.1 MG/DL    Bilirubin, total 0.3 0.2 - 1.0 MG/DL    ALT (SGPT) 20 12 - 78 U/L    AST (SGOT) 18 15 - 37 U/L    Alk. phosphatase 73 45 - 117 U/L    Protein, total 7.0 6.4 - 8.2 g/dL    Albumin 3.5 3.5 - 5.0 g/dL    Globulin 3.5 2.0 - 4.0 g/dL    A-G Ratio 1.0 (L) 1.1 - 2.2     PROTHROMBIN TIME + INR    Collection Time: 02/04/21  6:45 PM   Result Value Ref Range    INR 1.0 0.9 - 1.1      Prothrombin time 10.4 9.0 - 11.1 sec   TROPONIN I    Collection Time: 02/04/21  6:45 PM   Result Value Ref Range    Troponin-I, Qt. <0.05 <0.05 ng/mL   SAMPLES BEING HELD    Collection Time: 02/04/21  6:45 PM   Result Value Ref Range    SAMPLES BEING HELD 1SST,1RED     COMMENT        Add-on orders for these samples will be processed based on acceptable specimen integrity and analyte stability, which may vary by analyte.    EKG, 12 LEAD, INITIAL    Collection Time: 02/04/21  9:14 PM   Result Value Ref Range    Ventricular Rate 67 BPM    Atrial Rate 67 BPM    P-R Interval 134 ms    QRS Duration 86 ms    Q-T Interval 404 ms    QTC Calculation (Bezet) 426 ms    Calculated P Axis 76 degrees    Calculated R Axis 44 degrees    Calculated T Axis 77 degrees    Diagnosis       Normal sinus rhythm  Septal infarct (cited on or before 17-MAR-2019)  Abnormal ECG  When compared with ECG of 09-JUL-2019 05:35,  No significant change was found     CBC W/O DIFF    Collection Time: 02/05/21  2:55 AM   Result Value Ref Range    WBC 4.3 3.6 - 11.0 K/uL    RBC 3.41 (L) 3.80 - 5.20 M/uL    HGB 11.0 (L) 11.5 - 16.0 g/dL    HCT 33.1 (L) 35.0 - 47.0 %    MCV 97.1 80.0 - 99.0 FL    MCH 32.3 26.0 - 34.0 PG    MCHC 33.2 30.0 - 36.5 g/dL    RDW 12.2 11.5 - 14.5 %    PLATELET 925 454 - 357 K/uL    MPV 8.9 8.9 - 12.9 FL    NRBC 0.0 0  WBC    ABSOLUTE NRBC 0.00 0.00 - 0.01 K/uL   PROTHROMBIN TIME + INR    Collection Time: 02/05/21  2:55 AM   Result Value Ref Range    INR 1.1 0.9 - 1.1      Prothrombin time 11.1 9.0 - 11.1 sec   PTT    Collection Time: 02/05/21  2:55 AM   Result Value Ref Range    aPTT 25.3 22.1 - 31.0 sec    aPTT, therapeutic range     58.0 - 77.0 SECS   TSH 3RD GENERATION    Collection Time: 02/05/21  2:55 AM   Result Value Ref Range    TSH 3.10 0.36 - 3.76 uIU/mL   METABOLIC PANEL, BASIC    Collection Time: 02/05/21  2:55 AM   Result Value Ref Range    Sodium 142 136 - 145 mmol/L    Potassium 3.4 (L) 3.5 - 5.1 mmol/L    Chloride 109 (H) 97 - 108 mmol/L    CO2 26 21 - 32 mmol/L    Anion gap 7 5 - 15 mmol/L    Glucose 88 65 - 100 mg/dL    BUN 22 (H) 6 - 20 MG/DL    Creatinine 0.70 0.55 - 1.02 MG/DL    BUN/Creatinine ratio 31 (H) 12 - 20      GFR est AA >60 >60 ml/min/1.73m2    GFR est non-AA >60 >60 ml/min/1.73m2    Calcium 7.3 (L) 8.5 - 10.1 MG/DL   MAGNESIUM    Collection Time: 02/05/21  2:55 AM   Result Value Ref Range    Magnesium 1.8 1.6 - 2.4 mg/dL   PHOSPHORUS    Collection Time: 02/05/21  2:55 AM   Result Value Ref Range    Phosphorus 3.2 2.6 - 4.7 MG/DL

## 2021-02-05 NOTE — H&P
[facility] [place][note 2[place]  [address] [place], [place]  [address] [phone]     Hospitalist Admission Note      NAME: Melina Flowers   :  7/3/1928   MRN:  [id number]     Date/Time:  2021 10:21 PM    Patient PCP: Zuly Ford MD    Emergency Contact:    Extended Emergency Contact Information  Primary Emergency Contact: RADHA FLOWERS  Mobile Phone: [phone]  Relation: Son  Secondary Emergency Contact: Paz Flowers  Home Phone: [phone]  Mobile Phone: [phone]  Relation: Child      Code: DNR    Isolation Precautions: There are currently no Active Isolations        Subjectiv     CHIEF COMPLAINT: Stroke like symptoms     HISTORY OF PRESENT ILLNESS:     Ms. Flowers is a 89 y.o. female with history that includes mild early dementia, hypothyroidism, and CVA 2-3 years ago presents Sudden onset of  acute neurological changes consisting of difficulty finding words that started around 5:30 pm.  Neurological deficits have improving. No other symptoms associated with this presentation.  Patient is right-handed.  By the time the patient admitted to the ER symptoms seem to have resolved.  ED workup unremarkable CT scan and was VAN negative.  Teleneuro did not recommend TPA only admission for further work-up.    No Known Allergies    Prior to Admission medications    Medication Sig Start Date End Date Taking? Authorizing Provider   donepeziL (Aricept) 10 mg tablet Take 1/2 tab by mouth x 1 month, then take 1 tab by mouth daily 20   Maria Teresa Levy MD   levothyroxine (SYNTHROID) 25 mcg tablet TAKE 1 TABLET IN THE MORNING BEFORE BREAKFAST 14   Uyen Cortez MD   cycloSPORINE (RESTASIS) 0.05 % ophthalmic emulsion Administer 1 Drop to both eyes two (2) times a day.    Provider, Historical   brinzolamide (AZOPT) 1 % ophthalmic suspension Administer 1 Drop to right eye two (2) times a day.    Provider, Historical   brimonidine-timolol (COMBIGAN) 0.2-0.5 % Drop ophthalmic  solution Administer 1 Drop to right eye every twelve (12) hours. Provider, Historical       Past Medical History:   Diagnosis Date    Cerebral amyloid angiopathy ([facility] [address] [facility]     CVA (cerebral vascular accident) ([facility] [address] 03/2019    aphasia and right sided weakness, s/p IV TPA with subsequent SAH/IPH, found to have change c/w CAA    Hypothyroid         Past Surgical History:   Procedure Laterality Date    HX BACK SURGERY      HX HIP REPLACEMENT      right    HX HYSTERECTOMY      HX TONSILLECTOMY         Social History     Tobacco Use    Smoking status: Never Smoker    Smokeless tobacco: Never Used   Substance Use Topics    Alcohol use: Yes     Alcohol/week: 0.8 standard drinks     Types: 1 Glasses of wine per week     Comment: rarely        Family History   Problem Relation Age of Onset    Dementia Mother         Dec 89yo, had memory loss for the last few years. [address] and medications were reviewed. Review of Systems (14 point ROS):  (bold if positive, if negative)    Gen:   , , , , , Eyes:  , , ENT:   , , decreased hearing, , CVS:   , , , , , , , Pulm: , , , , GI:       , , , , , , , :     , , , , MS:     , , , Skin:   , , , , Psy:    , , , , , , Endo: , , , Hem:  , , , Torey:   , , , , Tyrel:   , aphasia,, , , , , , , ,      Objective:      Visit Vitals  BP (!) 132/48 (BP 1 Location: Right upper arm, BP Patient Position: At rest)   Pulse 73   Temp 98.2 °F (36.8 °C)   Resp 15   Ht 5' (1.524 m)   Wt 43.5 kg (95 lb 14.4 oz)   SpO2 95%   BMI 18.73 kg/m²       Exam:     Physical Exam:    General:  Alert, cooperative, NAD  Head: Normocephalic, atraumatic  Eyes:  PERRL and EOMI sclera clear   ENT: Lips, mucosa, and tongue normal and midline.    Neck: supple, no tenderness  Lungs: CTA with good BS and normal effort   Heart: S1-S2, RRR    Abd: SNTBS(+)  Ext: no cyanosis, no edema    Pulses: 2+ and symmetric  Skin: Skin color, texture, turgor normal. No rashes or lesions  Neuro: Alert and oriented x 4 only missing the day of the month. Did seem to still have some difficulty with word finding but she said that this is due to being hesitant after her first stroke. Strength 3 out of 3 in upper and lower extremities bilaterally sensation intact. Cranial nerves II through XII grossly intact except for bilateral hearing loss for which she uses hearing aids. Psych: Not anxious, cooperative, normal affect    Labs:    Recent Labs     02/04/21  1845   WBC 5.7   HGB 12.2   HCT 36.3        Recent Labs     02/04/21  1845      K 4.0      CO2 27   GLU 98   BUN 28*   CREA 0.99   CA 8.7   ALB 3.5   TBILI 0.3   ALT 20     Lab Results   Component Value Date/Time    Glucose (POC) 95 02/04/2021 06:16 PM    Glucose (POC) 106 (H) 03/17/2019 10:01 PM     No results for input(s): PH, PCO2, PO2, HCO3, FIO2 in the last 72 hours. Recent Labs     02/04/21 1845   INR 1.0       Radiology and EKG reviewed:     Ct Code Neuro Head Wo Contrast    Result Date: 2/4/2021  No acute process. I personally reviewed and interpreted the imaging studies and agree with official reading. **Chart reviewed in [facility]**       Assessment/Plan:      Acute focal neurological deficit likely TIA (transient ischemic attack) (2/4/2021) causing aphasia, now resolved: Admit for further work-up and treatment. Stroke protocol orders in place. Permissive hypertension for the first 24 hours. NPO until bedside evaluation is performed. Oxygen per protocol with pulse oximetry. Aspirin, statin, as needed antiemetics, and supportive care. Labs:  CMP, CBC, A1C, Lipid, TSH, consider homocysteine and/or RPR  Imaging studies: MRI of brain and echocardiogram to rule out possible cardioembolic cause. Consults: Neurology, speech eval, PT OT. Acquired hypothyroidism (3/17/2019): TSH is being checked with the stroke work-up. Resume home levothyroxine dose.        Body mass index is 18.73 kg/m².: 18.5 - 24.9 Normal weight        Risk of deterioration: high      Total time spent with patient: 79 Minutes **I personally saw and examined the patient during this time period**                 Care Plan discussed with:  [x]Patient  []Family  []Care Giver  [x]ED Doc  [x]RN  []Specialist  []Care Manager     Discussed:  Code Status and Care Plan    Prophylaxis:  SCD's    Probable Disposition:  Home w/Family    Patient was seen:  Before midnight 2/4/2021                ___________________________________________________    Admitting Physician: Katherine Damon MD

## 2021-02-05 NOTE — PROGRESS NOTES
[facility]  [address] [place] [address] [phone]      Medical Progress Note      NAME: Melina Edwards   :  7/3/1928  MRM:  [id number]    Date/Time of service: 2021  9:22 AM       Subjective:     Chief Complaint:  Patient was personally seen and examined by me during this time period.  Chart reviewed.  Still with weakness, but expressive aphasia has improved        Objective:       Vitals:       Last 24hrs VS reviewed since prior progress note. Most recent are:    Visit Vitals  /60   Pulse 62   Temp 98.2 °F (36.8 °C)   Resp 14   Ht 5' (1.524 m)   Wt 43.5 kg (95 lb 14.4 oz)   SpO2 94%   BMI 18.73 kg/m²     SpO2 Readings from Last 6 Encounters:   21 94%   20 97%   19 95%   19 94%   19 98%   19 98%        No intake or output data in the 24 hours ending 21 0922     Exam:     Physical Exam:    Gen:  Elderly, thin, frail, NAD  HEENT:  Pink conjunctivae, PERRL, hearing intact to voice, moist mucous membranes  Neck:  Supple, without masses, thyroid non-tender  Resp:  No accessory muscle use, clear breath sounds without wheezes rales or rhonchi  Card:  No murmurs, normal S1, S2 without thrills, bruits or peripheral edema  Abd:  Soft, non-tender, non-distended, normoactive bowel sounds are present  Musc:  No cyanosis or clubbing  Skin:  No rashes   Neuro:  Cranial nerves 3-12 are grossly intact, chronic R sided weakness, follows commands appropriately  Psych:  fair insight, oriented to person, place and time, alert    Medications Reviewed: (see below)    Lab Data Reviewed: (see below)    ______________________________________________________________________    Medications:     Current Facility-Administered Medications   Medication Dose Route Frequency   • levothyroxine (SYNTHROID) tablet 25 mcg  25 mcg Oral ACB   • 0.9% sodium chloride with KCl 20 mEq/L infusion   IntraVENous CONTINUOUS   • ondansetron (ZOFRAN) injection 4  mg  4 mg IntraVENous Q6H PRN    aspirin chewable tablet 81 mg  81 mg Oral DAILY    atorvastatin (LIPITOR) tablet 80 mg  80 mg Oral QHS    labetaloL (NORMODYNE;TRANDATE) 20 mg/4 mL (5 mg/mL) injection 5 mg  5 mg IntraVENous Q10MIN PRN    bisacodyL (DULCOLAX) suppository 10 mg  10 mg Rectal DAILY PRN    acetaminophen (TYLENOL) tablet 650 mg  650 mg Oral Q4H PRN    Or    acetaminophen (TYLENOL) solution 650 mg  650 mg Per NG tube Q4H PRN    Or    acetaminophen (TYLENOL) suppository 650 mg  650 mg Rectal Q4H PRN     Current Outpatient Medications   Medication Sig    donepeziL (Aricept) 10 mg tablet Take 1/2 tab by mouth x 1 month, then take 1 tab by mouth daily    levothyroxine (SYNTHROID) 25 mcg tablet TAKE 1 TABLET IN THE MORNING BEFORE BREAKFAST    cycloSPORINE (RESTASIS) 0.05 % ophthalmic emulsion Administer 1 Drop to both eyes two (2) times a day.  brinzolamide (AZOPT) 1 % ophthalmic suspension Administer 1 Drop to right eye two (2) times a day.  brimonidine-timolol (COMBIGAN) 0.2-0.5 % Drop ophthalmic solution Administer 1 Drop to right eye every twelve (12) hours. Lab Review:     Recent Labs     02/05/21  0255 02/04/21  1845   WBC 4.3 5.7   HGB 11.0* 12.2   HCT 33.1* 36.3    211     Recent Labs     02/05/21  0255 02/04/21  1845    139   K 3.4* 4.0   * 105   CO2 26 27   GLU 88 98   BUN 22* 28*   CREA 0.70 0.99   CA 7.3* 8.7   MG 1.8  --    PHOS 3.2  --    ALB  --  3.5   TBILI  --  0.3   ALT  --  20   INR 1.1 1.0     Lab Results   Component Value Date/Time    Glucose (POC) 95 02/04/2021 06:16 PM    Glucose (POC) 106 (H) 03/17/2019 10:01 PM          Assessment / Plan: Active Problems:    81 yo hx of CVA w/ R sided weakness, cerebral amyloid, presented w/ expressive aphasia    1) TIA/expressive aphasia: symptoms have resolve. Has chronic mild R sided weakness. Head MRI neg for acute CVA. LDL 99.  Echo pending. Will cont ASA, statin.   Awaiting Neuro, PT/OT    2) Cerebral amyloid: defer to neuro    3) Hypothyroid: cont synthroid    Total time spent with patient: 35 min **I personally saw and examined the patient during this time period**                 Care Plan discussed with: Patient, nursing    Discussed:  Care Plan    Prophylaxis:  SCD's    Disposition:  Assisted living           ___________________________________________________    Attending Physician: Tuyet Rothman MD

## 2021-02-05 NOTE — PROGRESS NOTES
2:30 PM  Cm received notification from [facility], they will see pt on Sunday and reach out to her. Called and let pt's son know of plan. No further dc needs. Ap [place]    12:39 PM  CM noted CM consult for home health- pt agreeable to referral instead of outpatient PT at this time Pt would like referral sent to R . Will send referral and follow up. Ap [place]    10:27 AM  CM reviewed EMR and met with pt in room with masks on to complete the initial assessment. Confirmed charted demographics. Reason for Admission:   TIA                   RUR Score:      N/A -- observation status               Plan for utilizing home health:   Used in the past but would prefer outpatient PT if possible    PCP: First and Last name:  Deng Ramirez   Name of Practice:    Are you a current patient: Yes/No: Yes   Approximate date of last visit: \"Some months ago\"   Can you participate in a virtual visit with your PCP: No                    Current Advanced Directive/Advance Care Plan: Has ACP documents. Pt discussed with MD she would like to be DNR- confirmed with pt                         Transition of Care Plan:                    Pt resides at [address] She uses a RW to assist with ambulation and is independent with ADL's and iADL's but was starting weak and made an appointment with her PCP to discuss outpatient PT for next week. 1). Pt admitted for medical management  2). PT/OT/SPT consulted-- waiting for recommendations  3). Pt would prefer outpatient PT if home health recommended  4). Family will transport at dc  5). CM will follow for dc needs. Care Management Interventions  PCP Verified by CM: Yes(been a couple of months, has an appointment scheduled for next week)  Mode of Transport at Discharge:  Other (see comment)  Transition of Care Consult (CM Consult): Discharge Planning  Discharge Durable Medical Equipment: No  Physical Therapy Consult: Yes  Occupational Therapy Consult: Yes  Speech Therapy Consult: Yes  Current Support Network:  Other([facility] )  Confirm Follow Up Transport: Other (see comment)([facility] has trasportation)  Discharge Location  Discharge Placement: Unable to determine at this time

## 2021-02-22 ENCOUNTER — OFFICE VISIT (OUTPATIENT)
Dept: NEUROLOGY | Age: 86
End: 2021-02-22
Payer: MEDICARE

## 2021-02-22 VITALS
SYSTOLIC BLOOD PRESSURE: 120 MMHG | OXYGEN SATURATION: 98 % | HEART RATE: 75 BPM | WEIGHT: 98.2 LBS | DIASTOLIC BLOOD PRESSURE: 60 MMHG | BODY MASS INDEX: 19.18 KG/M2

## 2021-02-22 DIAGNOSIS — R41.3 MEMORY LOSS: ICD-10-CM

## 2021-02-22 DIAGNOSIS — R26.89 IMBALANCE: ICD-10-CM

## 2021-02-22 DIAGNOSIS — I68.0 CEREBRAL AMYLOID ANGIOPATHY (CODE): ICD-10-CM

## 2021-02-22 DIAGNOSIS — G45.9 TIA (TRANSIENT ISCHEMIC ATTACK): Primary | ICD-10-CM

## 2021-02-22 PROCEDURE — 1090F PRES/ABSN URINE INCON ASSESS: CPT | Performed by: PSYCHIATRY & NEUROLOGY

## 2021-02-22 PROCEDURE — G8427 DOCREV CUR MEDS BY ELIG CLIN: HCPCS | Performed by: PSYCHIATRY & NEUROLOGY

## 2021-02-22 PROCEDURE — G8536 NO DOC ELDER MAL SCRN: HCPCS | Performed by: PSYCHIATRY & NEUROLOGY

## 2021-02-22 PROCEDURE — G8432 DEP SCR NOT DOC, RNG: HCPCS | Performed by: PSYCHIATRY & NEUROLOGY

## 2021-02-22 PROCEDURE — 99214 OFFICE O/P EST MOD 30 MIN: CPT | Performed by: PSYCHIATRY & NEUROLOGY

## 2021-02-22 PROCEDURE — 3288F FALL RISK ASSESSMENT DOCD: CPT | Performed by: PSYCHIATRY & NEUROLOGY

## 2021-02-22 PROCEDURE — G8420 CALC BMI NORM PARAMETERS: HCPCS | Performed by: PSYCHIATRY & NEUROLOGY

## 2021-02-22 PROCEDURE — 1100F PTFALLS ASSESS-DOCD GE2>/YR: CPT | Performed by: PSYCHIATRY & NEUROLOGY

## 2021-02-22 NOTE — PROGRESS NOTES
Neurology Consult Note      HISTORY PROVIDED BY: patient and DIL    Chief Complaint:   Chief Complaint   Patient presents with    Hearing Loss    TIA     feb 1, doing better since then      Subjective:   Pt is a 80 y.o. right handed female last seen in clinic on 6/29/20 in f/u for CAA diagnosed after hospitalized 3/18/19 - 3/21/19, presenting to [facility] with aphasia, right sided weakness, s/p IV TPA with subsequent SAH and IPH. 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  6/29/20 MMSE score 29/30 unable to copy, slightly unsteady gait. Discussed diagnosis of CAA and need to avoid antiplatelet drugs and anticoagulation due to increase risk of cerebral hemorrhage which puts her at risk for ischemic stroke. Repeat MRI brain wo contrast ordered. Discussed association of dementia with CAA. Started Aricept 5mg daily x 1 month, then 10mg daily. Recommended she return to [address] [place] [address] for PT with Kaley for gait and fall prevention. Discussed my concerns regarding her traveling alone to [facility] due to increased risk of falling and confusion related to fatigue and being out of her normal surroundings, and particularly at the moment, due to risk of COVID-19 infection. She returns for f/u. MRI brain wo contrast 7/16/20 was stable. She was recently admitted to [facility] 2/4/21 - 2/5/21 for word finding and right facial droop. MRI brain 2/5/21 was neg for acute stroke and pt was started on ASA 81mg daily. LDL was 99, started on Lipitor 40mg daily. MRI reviewed with pt and her DIL in PACS. Pt biggest concern is her gait and balance, an ongoing issue. Previous testing:  CT head 3/18/19 , CTA H/N - without acute findings, no LVO. S/p IV TPA, noted to have worsening aphasia, repeat head CT with SAH in right frontal and occipital lobe and left occipital IPH.   MRI brain wo contrast 3/18/19 with stable hemorrhages, multiple foci of low signal on GRE c/w cerebral amyloid angiopathy, no DWI + lesions, mild CIWM disease. Past Medical History:   Diagnosis Date    Cerebral amyloid angiopathy (HCC)     CVA (cerebral vascular accident) ([facility] [address] 03/2019    aphasia and right sided weakness, s/p IV TPA with subsequent SAH/IPH, found to have change c/w CAA    Hypothyroid       Past Surgical History:   Procedure Laterality Date    HX BACK SURGERY      HX HIP REPLACEMENT      right    HX HYSTERECTOMY      HX TONSILLECTOMY        Social History     Socioeconomic History    Marital status:      Spouse name: Not on file    Number of children: Not on file    Years of education: Not on file    Highest education level: Not on file   Occupational History    Not on file   Social Needs    Financial resource strain: Not on file    Food insecurity     Worry: Not on file     Inability: Not on file    Transportation needs     Medical: Not on file     Non-medical: Not on file   Tobacco Use    Smoking status: Never Smoker    Smokeless tobacco: Never Used   Substance and Sexual Activity    Alcohol use:  Yes     Alcohol/week: 0.8 standard drinks     Types: 1 Glasses of wine per week     Comment: rarely    Drug use: No    Sexual activity: Not Currently   Lifestyle    Physical activity     Days per week: Not on file     Minutes per session: Not on file    Stress: Not on file   Relationships    Social connections     Talks on phone: Not on file     Gets together: Not on file     Attends Zoroastrianism service: Not on file     Active member of club or organization: Not on file     Attends meetings of clubs or organizations: Not on file     Relationship status: Not on file    Intimate partner violence     Fear of current or ex partner: Not on file     Emotionally abused: Not on file     Physically abused: Not on file     Forced sexual activity: Not on file   Other Topics Concern    Not on file   Social History Narrative    Not on file     Family History   Problem Relation Age of Onset    Dementia Mother         Dec 89yo, had memory loss for the last few years. Objective:   Review of Systems   Constitutional: Negative. HENT: Positive for hearing loss. Eyes: Negative. Respiratory: Negative. Cardiovascular: Positive for leg swelling. Gastrointestinal: Negative. Genitourinary: Negative. Musculoskeletal: Negative. Skin: Negative. Neurological: Positive for dizziness. Endo/Heme/Allergies: Negative. Psychiatric/Behavioral: Positive for memory loss. No Known Allergies     Meds:  Outpatient Medications Prior to Visit   Medication Sig Dispense Refill    aspirin 81 mg chewable tablet Take 1 Tab by mouth daily. 30 Tab 0    atorvastatin (LIPITOR) 40 mg tablet Take 1 Tab by mouth nightly. 30 Tab 0    donepeziL (Aricept) 10 mg tablet Take 1/2 tab by mouth x 1 month, then take 1 tab by mouth daily 90 Tab 3    levothyroxine (SYNTHROID) 25 mcg tablet TAKE 1 TABLET IN THE MORNING BEFORE BREAKFAST 60 Tab 6    cycloSPORINE (RESTASIS) 0.05 % ophthalmic emulsion Administer 1 Drop to both eyes two (2) times a day.  brinzolamide (AZOPT) 1 % ophthalmic suspension Administer 1 Drop to right eye two (2) times a day.  brimonidine-timolol (COMBIGAN) 0.2-0.5 % Drop ophthalmic solution Administer 1 Drop to right eye every twelve (12) hours. No facility-administered medications prior to visit. Imaging:  MRI Results (most recent):  Results from Hospital Encounter encounter on 02/04/21   MRI BRAIN WO CONT    Narrative EXAM:  MRI BRAIN WO CONT  INDICATION:  Rule out CVA  TECHNIQUE:  Sagittal T1, axial FLAIR, T2,T1 and gradient echo images as well as coronal T2  weighted images and axial diffusion weighted images of the head were obtained. COMPARISON:  CT, CTA 2/4/21  FINDINGS:  Generalized prominence ventricles and sulci consistent with volume loss most  prominent centrally and unchanged from prior exams.   Multifocal and some confluent T2 hyperintensity in the cerebral white matter. Nonspecific pattern suggesting chronic small vessel ischemic change. Additionally, as producing noted there are numerous foci of punctate hemosiderin  deposition in the supratentorial area primarily peripheral in the cerebral  hemispheres with minimal involvement basal ganglia, however there is prominent  involvement of the right cerebellum. This is a nonspecific pattern more  suggestive of amyloid angiopathy than chronic small vessel ischemic change,  however may represent a mixed pattern. There is no definitive abnormal restricted diffusion or other findings of acute  or subacute brain infarction. No evidence of acute intracranial hemorrhage, mass or abnormal extra-axial fluid  collections. Flow voids are present in vertebral basilar and carotid artery systems. Structures skull base including paranasal sinuses are unremarkable. Craniocervical junction demonstrates spondylosis, chronic degenerative changes  around the odontoid with mild stenosis and prior cervical fusion. Impression 1. Chronic age-related volume loss and white matter disease somewhat greater  than expected and similar to prior exams. 2. Again demonstrated are punctate foci hemosiderin suggesting possibility of a  component of amyloid angiopathy superimposed on other chronic small vessel  ischemic change. 3. No acute intracranial abnormality demonstrated. CT Results (most recent):     Results from Hospital Encounter encounter on 02/04/21   CT CODE NEURO PERF W CBF    Narrative *PRELIMINARY REPORT*    No acute large vessel occlusion. No perfusion abnormality. Preliminary report was provided by Dr. Noy Rodriguez, the on-call radiologist, at 2000  during downtime    Final report to follow.     *END PRELIMINARY REPORT*    FINAL REPORT:    INDICATION: Code Stroke    EXAMINATION:  CT ANGIOGRAPHY HEAD AND NECK    COMPARISON: CT head earlier the same day, prior MRI/MRA July 2019    TECHNIQUE:  Following the uneventful administration of  intravenous contrast,  axial CT angiography of the head and neck was performed. 3D image  postprocessing was performed. CT dose reduction was achieved through use of a  standardized protocol tailored for this examination and automatic exposure  control for dose modulation. Cerebral perfusion analysis using computed tomography with contrast  administration, including post processing of parametric maps with the  termination of cerebral blood flow, cerebral blood volume, and mean transit  time. FINDINGS:    CTA NECK    Aortic Arch: Patent. Right Common Carotid Artery: Patent. Right Internal Carotid Artery: Moderate calcific plaque, with mild stenosis at  the origin and proximal internal carotid artery. NASCET Right: Less than 25%  Left Common Carotid Artery: Mild calcific atherosclerosis at the origin without  stenosis. Left Internal Carotid Artery: Moderate calcific plaque along the undersurface of  the origin without significant stenosis. NASCET Left: 0-25%. Carotid stenosis determined using NASCET criteria. Right Vertebral Artery: Tortuous, patent, with severe stenosis at the origin. Left Vertebral Artery: Tortuous, patent. Cervical Soft Tissues: No significant abnormality. Lung Apices: Biapical pleural and parenchymal scarring. Bones: Degenerative changes throughout the cervical spine. Moderate narrowing at  the foramen magnum by degenerative soft tissue around the C1-2 junction. No  destructive bone lesion. Additional Comments: Extensive venous contrast particularly in the paraspinal  region of the cervical spine, as well as in the left internal jugular vein due  to narrowing of the brachiocephalic vein between the innominate artery and  sternum. No venous thrombosis. CTA HEAD    Posterior Circulation: Mild calcification right V4 vertebral artery segment, and  moderate calcification proximal V4 left vertebral artery segment. No stenosis. Basilar artery is patent.  Right posterior cerebral artery supplied by posterior  communicating artery. Left posterior cerebral artery is patent. Anterior Circulation: Mild calcific atherosclerosis bilateral cavernous and  supraclinoid segments. Anterior and middle cerebral arteries are patent. Additional Comments: No evidence of aneurysm or vascular malformation. CT PERFUSION:  There are no regional areas of elevated Tmax, decreased cerebral blood flow or  blood volume. RCBF < 30% = 0 cc. Tmax > 6 seconds = 0 cc. Mismatch volume = 0 cc. Impression No acute process. No large vessel occlusion, arterial dissection, or  flow-limiting stenosis. No perfusion abnormality. Atherosclerotic vascular  disease and left brachiocephalic vein narrowing as discussed in full detail  above. Reviewed records in [employer] and [employer] tab today    Lab Review   Results for orders placed or performed during the hospital encounter of 02/04/21   CBC WITH AUTOMATED DIFF   Result Value Ref Range    WBC 5.7 3.6 - 11.0 K/uL    RBC 3.78 (L) 3.80 - 5.20 M/uL    HGB 12.2 11.5 - 16.0 g/dL    HCT 36.3 35.0 - 47.0 %    MCV 96.0 80.0 - 99.0 FL    MCH 32.3 26.0 - 34.0 PG    MCHC 33.6 30.0 - 36.5 g/dL    RDW 12.1 11.5 - 14.5 %    PLATELET 105 646 - 096 K/uL    MPV 9.0 8.9 - 12.9 FL    NRBC 0.0 0  WBC    ABSOLUTE NRBC 0.00 0.00 - 0.01 K/uL    NEUTROPHILS 60 32 - 75 %    LYMPHOCYTES 29 12 - 49 %    MONOCYTES 8 5 - 13 %    EOSINOPHILS 2 0 - 7 %    BASOPHILS 1 0 - 1 %    IMMATURE GRANULOCYTES 0 0.0 - 0.5 %    ABS. NEUTROPHILS 3.4 1.8 - 8.0 K/UL    ABS. LYMPHOCYTES 1.7 0.8 - 3.5 K/UL    ABS. MONOCYTES 0.5 0.0 - 1.0 K/UL    ABS. EOSINOPHILS 0.1 0.0 - 0.4 K/UL    ABS. BASOPHILS 0.0 0.0 - 0.1 K/UL    ABS. IMM.  GRANS. 0.0 0.00 - 0.04 K/UL    DF AUTOMATED     METABOLIC PANEL, COMPREHENSIVE   Result Value Ref Range    Sodium 139 136 - 145 mmol/L    Potassium 4.0 3.5 - 5.1 mmol/L    Chloride 105 97 - 108 mmol/L    CO2 27 21 - 32 mmol/L    Anion gap 7 5 - 15 mmol/L    Glucose 98 65 - 100 mg/dL    BUN 28 (H) 6 - 20 MG/DL    Creatinine 0.99 0.55 - 1.02 MG/DL    BUN/Creatinine ratio 28 (H) 12 - 20      GFR est AA >60 >60 ml/min/1.73m2    GFR est non-AA 52 (L) >60 ml/min/1.73m2    Calcium 8.7 8.5 - 10.1 MG/DL    Bilirubin, total 0.3 0.2 - 1.0 MG/DL    ALT (SGPT) 20 12 - 78 U/L    AST (SGOT) 18 15 - 37 U/L    Alk. phosphatase 73 45 - 117 U/L    Protein, total 7.0 6.4 - 8.2 g/dL    Albumin 3.5 3.5 - 5.0 g/dL    Globulin 3.5 2.0 - 4.0 g/dL    A-G Ratio 1.0 (L) 1.1 - 2.2     PROTHROMBIN TIME + INR   Result Value Ref Range    INR 1.0 0.9 - 1.1      Prothrombin time 10.4 9.0 - 11.1 sec   TROPONIN I   Result Value Ref Range    Troponin-I, Qt. <0.05 <0.05 ng/mL   SAMPLES BEING HELD   Result Value Ref Range    SAMPLES BEING HELD 1SST,1RED     COMMENT        Add-on orders for these samples will be processed based on acceptable specimen integrity and analyte stability, which may vary by analyte.    CBC W/O DIFF   Result Value Ref Range    WBC 4.3 3.6 - 11.0 K/uL    RBC 3.41 (L) 3.80 - 5.20 M/uL    HGB 11.0 (L) 11.5 - 16.0 g/dL    HCT 33.1 (L) 35.0 - 47.0 %    MCV 97.1 80.0 - 99.0 FL    MCH 32.3 26.0 - 34.0 PG    MCHC 33.2 30.0 - 36.5 g/dL    RDW 12.2 11.5 - 14.5 %    PLATELET 274 964 - 053 K/uL    MPV 8.9 8.9 - 12.9 FL    NRBC 0.0 0  WBC    ABSOLUTE NRBC 0.00 0.00 - 0.01 K/uL   PROTHROMBIN TIME + INR   Result Value Ref Range    INR 1.1 0.9 - 1.1      Prothrombin time 11.1 9.0 - 11.1 sec   PTT   Result Value Ref Range    aPTT 25.3 22.1 - 31.0 sec    aPTT, therapeutic range     58.0 - 77.0 SECS   LIPID PANEL   Result Value Ref Range    LIPID PROFILE          Cholesterol, total 167 <200 MG/DL    Triglyceride 38 <150 MG/DL    HDL Cholesterol 60 MG/DL    LDL, calculated 99.4 0 - 100 MG/DL    VLDL, calculated 7.6 MG/DL    CHOL/HDL Ratio 2.8 0.0 - 5.0     HEMOGLOBIN A1C WITH EAG   Result Value Ref Range    Hemoglobin A1c 5.6 4.0 - 5.6 %    Est. average glucose 114 mg/dL   TSH 3RD GENERATION   Result Value Ref Range    TSH 3.10 0.36 - 7.58 uIU/mL   METABOLIC PANEL, BASIC   Result Value Ref Range    Sodium 142 136 - 145 mmol/L    Potassium 3.4 (L) 3.5 - 5.1 mmol/L    Chloride 109 (H) 97 - 108 mmol/L    CO2 26 21 - 32 mmol/L    Anion gap 7 5 - 15 mmol/L    Glucose 88 65 - 100 mg/dL    BUN 22 (H) 6 - 20 MG/DL    Creatinine 0.70 0.55 - 1.02 MG/DL    BUN/Creatinine ratio 31 (H) 12 - 20      GFR est AA >60 >60 ml/min/1.73m2    GFR est non-AA >60 >60 ml/min/1.73m2    Calcium 7.3 (L) 8.5 - 10.1 MG/DL   MAGNESIUM   Result Value Ref Range    Magnesium 1.8 1.6 - 2.4 mg/dL   PHOSPHORUS   Result Value Ref Range    Phosphorus 3.2 2.6 - 4.7 MG/DL   HOMOCYSTEINE, PLASMA   Result Value Ref Range    Homocysteine, plasma 4.8 3.7 - 13.9 umol/L   GLUCOSE, POC   Result Value Ref Range    Glucose (POC) 95 65 - 100 mg/dL    Performed by RACHAEL MAHER    EKG, 12 LEAD, INITIAL   Result Value Ref Range    Ventricular Rate 67 BPM    Atrial Rate 67 BPM    P-R Interval 134 ms    QRS Duration 86 ms    Q-T Interval 404 ms    QTC Calculation (Bezet) 426 ms    Calculated P Axis 76 degrees    Calculated R Axis 44 degrees    Calculated T Axis 77 degrees    Diagnosis       Normal sinus rhythm  Septal infarct (cited on or before 17-MAR-2019)  Abnormal ECG  When compared with ECG of 09-JUL-2019 05:35,  No significant change was found  Confirmed by Vaibhav Mccollum M.D., Flavio Contreras ([id number]) on 2/5/2021 11:01:33 AM     ECHO ADULT COMPLETE   Result Value Ref Range    IVSd 0.96 (A) 0.6 - 0.9 cm    LVIDd 3.06 (A) 3.9 - 5.3 cm    LVIDs 2.17 cm    LVOT d 1.99 cm    LVPWd 0.95 (A) 0.6 - 0.9 cm    LVOT Peak Gradient 3.54 mmHg    LVOT Peak Gradient 4.33 mmHg    Left Ventricular Outflow Tract Mean Gradient 1.80 mmHg    LVOT SV 69.7 mL    LVOT Peak Velocity 94.13 cm/s    LVOT Peak Velocity 104.02 cm/s    LVOT VTI 22.39 cm    RVIDd 3.38 cm    Left Atrium Major Axis 2.29 cm    LA Volume 33.82 22 - 52 mL    LA Vol 2C 18.39 (A) 22 - 52 mL    LA Vol 4C 41.09 22 - 52 mL    Aortic Valve Area by Continuity of Peak Velocity 1.89 cm2    Aortic Valve Area by Continuity of Peak Velocity 2.08 cm2    Aortic Valve Area by Continuity of Peak Velocity 2.08 cm2    Aortic Valve Area by Continuity of Peak Velocity 1.88 cm2    Aortic Valve Area by Continuity of VTI 1.83 cm2    AV R PG 79.77 mmHg    Aortic Regurgitant Pressure Half-time 555.46 ms    AR Max Joce 446.42 cm/s    AoV PG 11.62 mmHg    AoV PG 9.69 mmHg    Aortic Valve Systolic Mean Gradient 8.78 mmHg    Aortic Valve Systolic Peak Velocity 424.17 cm/s    Aortic Valve Systolic Peak Velocity 780.57 cm/s    AoV VTI 38.18 cm    MV A Joce 93.83 cm/s    Mitral Valve E Wave Deceleration Time 246.53 ms    MV E Joce 80.64 cm/s    Pulmonic Regurgitant End Max Velocity 84.99 cm/s    Pulmonic Valve Systolic Peak Instantaneous Gradient 2.13 mmHg    Triscuspid Valve Regurgitation Peak Gradient 22.75 mmHg    TR Max Velocity 238.47 cm/s    Ao Root D 2.86 cm    MV E/A 0.86     LV Mass AL 78.9 67 - 162 g    LV Mass AL Index 58.0 43 - 95 g/m2    Left Atrium Minor Axis 1.68 cm    LA Vol Index 24.86 16 - 28 ml/m2    LA Vol Index 13.52 16 - 28 ml/m2    LA Vol Index 30.21 16 - 28 ml/m2    MANOJ/BSA VTI 1.3 cm2/m2        Exam:  Visit Vitals  /60   Pulse 75   Wt 98 lb 3.2 oz (44.5 kg)   SpO2 98%   BMI 19.18 kg/m²     General:  Alert, cooperative, no distress. Head:  Normocephalic, without obvious abnormality, atraumatic. Respiratory:  Heart:   Non labored breathing  Regular rate and rhythm, no murmurs   Neck:      Extremities: Warm, no cyanosis or edema. Pulses: 2+ radial pulses. Neurologic:  MS: Alert and oriented, speech intact. Language- no word finding, able to follow all commands. .Attention and fund of knowledge appropriate. Recent and remote memory impaired to portions of history, has good memory of certain details.    Cranial Nerves:  II: visual fields    II: pupils    II: optic disc    III,VII: ptosis none   III,IV,VI: extraocular muscles  EOMI, no nystagmus or diplopia   V: facial light touch sensation     VII: facial muscle function   symmetric   VIII: hearing intact   IX: soft palate elevation     XI: trapezius strength     XI: sternocleidomastoid strength    XII: tongue       Motor: normal bulk and tone, no tremor, Strength: 5/5 , bicep, HF, no PD  Sensory:   Coordination: Intact FTN, HTS  Gait: Steady with rolling walker, takes extra steps to turn. Reflexes:     Mini Mental State Exam 2020   What is the Year 1   What is the Season 1   What is the Date 1   What is the Day 1   What is the Month 1   Where are we State 1   Where are we Country 1   Where are we [place] or [place] 1   Where are we Floor 1   Name three objects, then ask the patient to say them 3   Serial sevens Subtract 7 from 100 in increments 5   Ask for the three objects repeated above 3   Name a pencil 1   Name a watch 1   Have the patient repeat this phrase \"No ifs, ands, or buts\" 1   Three stage command: Take the paper in your right hand 1   Fold the paper in half 1   Put the paper on the floor 1   Read and obey the followin [employer] 1   Have the patient write a sentence 1   Have the patient copy a figure 0   Mini Mental Score 29          Assessment/Plan   Pt is a 80 y.o. right handed female with CAA diagnosed after hospitalized 3/18/19 - 3/21/19, presenting to [facility] with aphasia, right sided weakness, s/p IV TPA with subsequent SAH and IPH. 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 20 MMSE score 29/30 unable to copy, slightly unsteady gait. Admitted to [facility] 21 - 21 again for word finding and right facial droop. MRI brain 21 was neg for acute stroke and pt was started on ASA 81mg daily. LDL was 99, started on Lipitor 40mg daily. Discussed diagnosis of CAA and risk of cerebral hemorrhage.  However, given two TIAs with aphasia and right side weakness, it seems benefit of ischemic stroke prevention with ASA 81mg daily outweighs the risk of hemorrhage. Discussed stroke risk factors. Discussed association of dementia with CAA. Continue Aricept 10mg daily. Discussed PT, she has someone who is coming to her home, but has had trouble reconnecting for additional visits to work on gait. Hopefully her family can assist with arranging this. Pointed out to pt that she has a very steady gait with use of her walker, but she points out that she doesn't want to use this in her home. F/u in clinic in 3 months, instructed to call in the interim if needed. ICD-10-CM ICD-9-CM    1. TIA (transient ischemic attack)  G45.9 435.9    2. Cerebral amyloid angiopathy (CODE)  I68.0 277.39      437.9    3. Imbalance  R26.89 781.2    4. Memory loss  R41.3 780.93        Signed:   Ada Waldrop MD  2/22/2021

## 2021-05-25 ENCOUNTER — OFFICE VISIT (OUTPATIENT)
Dept: NEUROLOGY | Age: 86
End: 2021-05-25
Payer: MEDICARE

## 2021-05-25 VITALS
WEIGHT: 97.2 LBS | HEART RATE: 82 BPM | BODY MASS INDEX: 17.89 KG/M2 | OXYGEN SATURATION: 97 % | HEIGHT: 62 IN | DIASTOLIC BLOOD PRESSURE: 70 MMHG | SYSTOLIC BLOOD PRESSURE: 130 MMHG

## 2021-05-25 DIAGNOSIS — I68.0 CEREBRAL AMYLOID ANGIOPATHY (CODE): Primary | ICD-10-CM

## 2021-05-25 DIAGNOSIS — G45.9 TIA (TRANSIENT ISCHEMIC ATTACK): ICD-10-CM

## 2021-05-25 PROCEDURE — 1090F PRES/ABSN URINE INCON ASSESS: CPT | Performed by: PSYCHIATRY & NEUROLOGY

## 2021-05-25 PROCEDURE — G8419 CALC BMI OUT NRM PARAM NOF/U: HCPCS | Performed by: PSYCHIATRY & NEUROLOGY

## 2021-05-25 PROCEDURE — G8427 DOCREV CUR MEDS BY ELIG CLIN: HCPCS | Performed by: PSYCHIATRY & NEUROLOGY

## 2021-05-25 PROCEDURE — 99214 OFFICE O/P EST MOD 30 MIN: CPT | Performed by: PSYCHIATRY & NEUROLOGY

## 2021-05-25 PROCEDURE — G8432 DEP SCR NOT DOC, RNG: HCPCS | Performed by: PSYCHIATRY & NEUROLOGY

## 2021-05-25 PROCEDURE — 1100F PTFALLS ASSESS-DOCD GE2>/YR: CPT | Performed by: PSYCHIATRY & NEUROLOGY

## 2021-05-25 PROCEDURE — G8536 NO DOC ELDER MAL SCRN: HCPCS | Performed by: PSYCHIATRY & NEUROLOGY

## 2021-05-25 PROCEDURE — 3288F FALL RISK ASSESSMENT DOCD: CPT | Performed by: PSYCHIATRY & NEUROLOGY

## 2021-05-25 RX ORDER — FLUOXETINE 10 MG/1
TABLET ORAL
COMMUNITY
Start: 2021-04-29 | End: 2021-07-19

## 2021-05-25 NOTE — PATIENT INSTRUCTIONS
-Please take aspirin 81mg daily 
-Please restart Lipitor, may only need 20mg daily 
-Try to stay socially active 
-I would prefer you used your walker at all times. 
-Consider increasing fluoxetine to 10mg daily

## 2021-05-25 NOTE — PROGRESS NOTES
Neurology Consult Note      HISTORY PROVIDED BY: patient and daughter who lives in [place]    Chief Complaint:   Chief Complaint   Patient presents with    Follow-up    TIA      Subjective:   Pt is a 80 y.o. right handed female last seen in clinic on 2/22/21 in f/u for CAA, diagnosed after hospitalized 3/18/19 - 3/21/19, presenting to [facility] with aphasia, right sided weakness, s/p IV TPA with subsequent SAH and IPH. 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 6/29/20 MMSE score 29/30 unable to copy, slightly unsteady gait. Admitted to [facility] 2/4/21 - 2/5/21 again for word finding and right facial droop. MRI brain 2/5/21 was neg for acute stroke and pt was started on ASA 81mg daily. LDL was 99, started on Lipitor 40mg daily. Discussed diagnosis of CAA and risk of cerebral hemorrhage. However, given two TIAs with aphasia and right side weakness, it seems benefit of ischemic stroke prevention with ASA 81mg daily outweighs the risk of hemorrhage. Discussed stroke risk factors. Discussed association of dementia with CAA. Continued Aricept 10mg daily. Discussed PT, she has someone who is coming to her home, but has had trouble reconnecting for additional visits to work on gait. Hopefully her family can assist with arranging this. Pointed out to pt that she has a very steady gait with use of her walker, but she points out that she doesn't want to use this in her home. She returns for f/u. She is not taking ASA as directed. She does not want to take Lipitor and had recent blood work at [facility] office and told her she could stop the Lipitor. She is walking daily. Her daughter notes she naps throughout the day. The pt attributes this to being depressed. She is living at [facility], activities there are slowly being restarted. Visitors are still not allowed to come to the dinning room. She is involved in an exercise class at [facility].  She refuses to use an assistive device in the house and her daughter notes that she is very unsteady. Previous testing:  CT head 3/18/19 , CTA H/N - without acute findings, no LVO. S/p IV TPA, noted to have worsening aphasia, repeat head CT with SAH in right frontal and occipital lobe and left occipital IPH. MRI brain wo contrast 3/18/19 with stable hemorrhages, multiple foci of low signal on GRE c/w cerebral amyloid angiopathy, no DWI + lesions, mild CIWM disease. MRI brain wo contrast 7/16/20 was stable  MRI brain 2/5/21 was neg for acute stroke    Past Medical History:   Diagnosis Date    Cerebral amyloid angiopathy (HCC)     CVA (cerebral vascular accident) ([facility] [address] 03/2019    aphasia and right sided weakness, s/p IV TPA with subsequent SAH/IPH, found to have change c/w CAA    Hypothyroid       Past Surgical History:   Procedure Laterality Date    HX BACK SURGERY      HX HIP REPLACEMENT      right    HX HYSTERECTOMY      HX TONSILLECTOMY        Social History     Socioeconomic History    Marital status:      Spouse name: Not on file    Number of children: Not on file    Years of education: Not on file    Highest education level: Not on file   Occupational History    Not on file   Tobacco Use    Smoking status: Never Smoker    Smokeless tobacco: Never Used   Substance and Sexual Activity    Alcohol use: Yes     Alcohol/week: 0.8 standard drinks     Types: 1 Glasses of wine per week     Comment: rarely    Drug use: No    Sexual activity: Not Currently   Other Topics Concern    Not on file   Social History Narrative    Not on file     Social Determinants of Health     Financial Resource Strain:     Difficulty of Paying Living Expenses:    Food Insecurity:     Worried About Running Out of Food in the Last Year:     [address] in the Last Year:    Transportation Needs:     Lack of Transportation (Medical):      Lack of Transportation (Non-Medical):    Physical Activity:     Days of Exercise per Week:     Minutes of Exercise per Session:    Stress:     Feeling of Stress :    Social Connections:     Frequency of Communication with Friends and Family:     Frequency of Social Gatherings with Friends and Family:     Attends Jew Services:     Active Member of Clubs or Organizations:     Attends Club or Organization Meetings:     Marital Status:    Intimate Partner Violence:     Fear of Current or Ex-Partner:     Emotionally Abused:     Physically Abused:     Sexually Abused:      Family History   Problem Relation Age of Onset    Dementia Mother         Dec 89yo, had memory loss for the last few years. Objective:   Review of Systems   Constitutional: Negative. HENT: Positive for hearing loss. Eyes: Negative. Respiratory: Negative. Cardiovascular: Positive for leg swelling. Gastrointestinal: Negative. Genitourinary: Negative. Musculoskeletal: Negative. Skin: Negative. Neurological: Positive for dizziness. Endo/Heme/Allergies: Negative. Psychiatric/Behavioral: Positive for memory loss. No Known Allergies     Meds:  Outpatient Medications Prior to Visit   Medication Sig Dispense Refill    donepeziL (Aricept) 10 mg tablet Take 1/2 tab by mouth x 1 month, then take 1 tab by mouth daily 90 Tab 3    cycloSPORINE (RESTASIS) 0.05 % ophthalmic emulsion Administer 1 Drop to both eyes two (2) times a day.  brinzolamide (AZOPT) 1 % ophthalmic suspension Administer 1 Drop to right eye two (2) times a day.  brimonidine-timolol (COMBIGAN) 0.2-0.5 % Drop ophthalmic solution Administer 1 Drop to right eye every twelve (12) hours.  FLUoxetine (PROzac) 10 mg tablet TAKE 1/2 TABLET BY MOUTH EVERY MORNING WITH BREAKFAST      aspirin 81 mg chewable tablet Take 1 Tab by mouth daily. (Patient not taking: Reported on 5/25/2021) 30 Tab 0    atorvastatin (LIPITOR) 40 mg tablet Take 1 Tab by mouth nightly.  (Patient not taking: Reported on 5/25/2021) 30 Tab 0    levothyroxine (SYNTHROID) 25 mcg tablet TAKE 1 TABLET IN THE MORNING BEFORE BREAKFAST (Patient not taking: Reported on 5/25/2021) 60 Tab 6     No facility-administered medications prior to visit. Imaging:  MRI Results (most recent):  Results from Hospital Encounter encounter on 02/04/21    MRI BRAIN WO CONT    Narrative  EXAM:  MRI BRAIN WO CONT  INDICATION:  Rule out CVA  TECHNIQUE:  Sagittal T1, axial FLAIR, T2,T1 and gradient echo images as well as coronal T2  weighted images and axial diffusion weighted images of the head were obtained. COMPARISON:  CT, CTA 2/4/21  FINDINGS:  Generalized prominence ventricles and sulci consistent with volume loss most  prominent centrally and unchanged from prior exams. Multifocal and some confluent T2 hyperintensity in the cerebral white matter. Nonspecific pattern suggesting chronic small vessel ischemic change. Additionally, as producing noted there are numerous foci of punctate hemosiderin  deposition in the supratentorial area primarily peripheral in the cerebral  hemispheres with minimal involvement basal ganglia, however there is prominent  involvement of the right cerebellum. This is a nonspecific pattern more  suggestive of amyloid angiopathy than chronic small vessel ischemic change,  however may represent a mixed pattern. There is no definitive abnormal restricted diffusion or other findings of acute  or subacute brain infarction. No evidence of acute intracranial hemorrhage, mass or abnormal extra-axial fluid  collections. Flow voids are present in vertebral basilar and carotid artery systems. Structures skull base including paranasal sinuses are unremarkable. Craniocervical junction demonstrates spondylosis, chronic degenerative changes  around the odontoid with mild stenosis and prior cervical fusion. Impression  1. Chronic age-related volume loss and white matter disease somewhat greater  than expected and similar to prior exams.   2. Again demonstrated are punctate foci hemosiderin suggesting possibility of a  component of amyloid angiopathy superimposed on other chronic small vessel  ischemic change. 3. No acute intracranial abnormality demonstrated. CT Results (most recent):     Results from Hospital Encounter encounter on 02/04/21   CT CODE NEURO PERF W CBF    Narrative *PRELIMINARY REPORT*    No acute large vessel occlusion. No perfusion abnormality. Preliminary report was provided by Dr. Kinza Cheung, the on-call radiologist, at 2000  during downtime    Final report to follow. *END PRELIMINARY REPORT*    FINAL REPORT:    INDICATION: Code Stroke    EXAMINATION:  CT ANGIOGRAPHY HEAD AND NECK    COMPARISON: CT head earlier the same day, prior MRI/MRA July 2019    TECHNIQUE:  Following the uneventful administration of  intravenous contrast,  axial CT angiography of the head and neck was performed. 3D image  postprocessing was performed. CT dose reduction was achieved through use of a  standardized protocol tailored for this examination and automatic exposure  control for dose modulation. Cerebral perfusion analysis using computed tomography with contrast  administration, including post processing of parametric maps with the  termination of cerebral blood flow, cerebral blood volume, and mean transit  time. FINDINGS:    CTA NECK    Aortic Arch: Patent. Right Common Carotid Artery: Patent. Right Internal Carotid Artery: Moderate calcific plaque, with mild stenosis at  the origin and proximal internal carotid artery. NASCET Right: Less than 25%  Left Common Carotid Artery: Mild calcific atherosclerosis at the origin without  stenosis. Left Internal Carotid Artery: Moderate calcific plaque along the undersurface of  the origin without significant stenosis. NASCET Left: 0-25%. Carotid stenosis determined using NASCET criteria. Right Vertebral Artery: Tortuous, patent, with severe stenosis at the origin. Left Vertebral Artery:  Tortuous, patent. Cervical Soft Tissues: No significant abnormality. Lung Apices: Biapical pleural and parenchymal scarring. Bones: Degenerative changes throughout the cervical spine. Moderate narrowing at  the foramen magnum by degenerative soft tissue around the C1-2 junction. No  destructive bone lesion. Additional Comments: Extensive venous contrast particularly in the paraspinal  region of the cervical spine, as well as in the left internal jugular vein due  to narrowing of the brachiocephalic vein between the innominate artery and  sternum. No venous thrombosis. CTA HEAD    Posterior Circulation: Mild calcification right V4 vertebral artery segment, and  moderate calcification proximal V4 left vertebral artery segment. No stenosis. Basilar artery is patent. Right posterior cerebral artery supplied by posterior  communicating artery. Left posterior cerebral artery is patent. Anterior Circulation: Mild calcific atherosclerosis bilateral cavernous and  supraclinoid segments. Anterior and middle cerebral arteries are patent. Additional Comments: No evidence of aneurysm or vascular malformation. CT PERFUSION:  There are no regional areas of elevated Tmax, decreased cerebral blood flow or  blood volume. RCBF < 30% = 0 cc. Tmax > 6 seconds = 0 cc. Mismatch volume = 0 cc. Impression No acute process. No large vessel occlusion, arterial dissection, or  flow-limiting stenosis. No perfusion abnormality. Atherosclerotic vascular  disease and left brachiocephalic vein narrowing as discussed in full detail  above.                Reviewed records in [facility] and [facility] tab today    Lab Review   Results for orders placed or performed during the hospital encounter of 02/04/21   CBC WITH AUTOMATED DIFF   Result Value Ref Range    WBC 5.7 3.6 - 11.0 K/uL    RBC 3.78 (L) 3.80 - 5.20 M/uL    HGB 12.2 11.5 - 16.0 g/dL    HCT 36.3 35.0 - 47.0 %    MCV 96.0 80.0 - 99.0 FL    MCH 32.3 26.0 - 34.0 PG    MCHC 33.6 30.0 - 36.5 g/dL    RDW 12.1 11.5 - 14.5 %    PLATELET 061 002 - 540 K/uL    MPV 9.0 8.9 - 12.9 FL    NRBC 0.0 0  WBC    ABSOLUTE NRBC 0.00 0.00 - 0.01 K/uL    NEUTROPHILS 60 32 - 75 %    LYMPHOCYTES 29 12 - 49 %    MONOCYTES 8 5 - 13 %    EOSINOPHILS 2 0 - 7 %    BASOPHILS 1 0 - 1 %    IMMATURE GRANULOCYTES 0 0.0 - 0.5 %    ABS. NEUTROPHILS 3.4 1.8 - 8.0 K/UL    ABS. LYMPHOCYTES 1.7 0.8 - 3.5 K/UL    ABS. MONOCYTES 0.5 0.0 - 1.0 K/UL    ABS. EOSINOPHILS 0.1 0.0 - 0.4 K/UL    ABS. BASOPHILS 0.0 0.0 - 0.1 K/UL    ABS. IMM. GRANS. 0.0 0.00 - 0.04 K/UL    DF AUTOMATED     METABOLIC PANEL, COMPREHENSIVE   Result Value Ref Range    Sodium 139 136 - 145 mmol/L    Potassium 4.0 3.5 - 5.1 mmol/L    Chloride 105 97 - 108 mmol/L    CO2 27 21 - 32 mmol/L    Anion gap 7 5 - 15 mmol/L    Glucose 98 65 - 100 mg/dL    BUN 28 (H) 6 - 20 MG/DL    Creatinine 0.99 0.55 - 1.02 MG/DL    BUN/Creatinine ratio 28 (H) 12 - 20      GFR est AA >60 >60 ml/min/1.73m2    GFR est non-AA 52 (L) >60 ml/min/1.73m2    Calcium 8.7 8.5 - 10.1 MG/DL    Bilirubin, total 0.3 0.2 - 1.0 MG/DL    ALT (SGPT) 20 12 - 78 U/L    AST (SGOT) 18 15 - 37 U/L    Alk. phosphatase 73 45 - 117 U/L    Protein, total 7.0 6.4 - 8.2 g/dL    Albumin 3.5 3.5 - 5.0 g/dL    Globulin 3.5 2.0 - 4.0 g/dL    A-G Ratio 1.0 (L) 1.1 - 2.2     PROTHROMBIN TIME + INR   Result Value Ref Range    INR 1.0 0.9 - 1.1      Prothrombin time 10.4 9.0 - 11.1 sec   TROPONIN I   Result Value Ref Range    Troponin-I, Qt. <0.05 <0.05 ng/mL   SAMPLES BEING HELD   Result Value Ref Range    SAMPLES BEING HELD 1SST,1RED     COMMENT        Add-on orders for these samples will be processed based on acceptable specimen integrity and analyte stability, which may vary by analyte.    CBC W/O DIFF   Result Value Ref Range    WBC 4.3 3.6 - 11.0 K/uL    RBC 3.41 (L) 3.80 - 5.20 M/uL    HGB 11.0 (L) 11.5 - 16.0 g/dL    HCT 33.1 (L) 35.0 - 47.0 %    MCV 97.1 80.0 - 99.0 FL    MCH 32.3 26.0 - 34.0 PG    MCHC 33.2 30.0 - 36.5 g/dL    RDW 12.2 11.5 - 14.5 %    PLATELET 013 183 - 258 K/uL    MPV 8.9 8.9 - 12.9 FL    NRBC 0.0 0  WBC    ABSOLUTE NRBC 0.00 0.00 - 0.01 K/uL   PROTHROMBIN TIME + INR   Result Value Ref Range    INR 1.1 0.9 - 1.1      Prothrombin time 11.1 9.0 - 11.1 sec   PTT   Result Value Ref Range    aPTT 25.3 22.1 - 31.0 sec    aPTT, therapeutic range     58.0 - 77.0 SECS   LIPID PANEL   Result Value Ref Range    LIPID PROFILE          Cholesterol, total 167 <200 MG/DL    Triglyceride 38 <150 MG/DL    HDL Cholesterol 60 MG/DL    LDL, calculated 99.4 0 - 100 MG/DL    VLDL, calculated 7.6 MG/DL    CHOL/HDL Ratio 2.8 0.0 - 5.0     HEMOGLOBIN A1C WITH EAG   Result Value Ref Range    Hemoglobin A1c 5.6 4.0 - 5.6 %    Est. average glucose 114 mg/dL   TSH 3RD GENERATION   Result Value Ref Range    TSH 3.10 0.36 - 8.86 uIU/mL   METABOLIC PANEL, BASIC   Result Value Ref Range    Sodium 142 136 - 145 mmol/L    Potassium 3.4 (L) 3.5 - 5.1 mmol/L    Chloride 109 (H) 97 - 108 mmol/L    CO2 26 21 - 32 mmol/L    Anion gap 7 5 - 15 mmol/L    Glucose 88 65 - 100 mg/dL    BUN 22 (H) 6 - 20 MG/DL    Creatinine 0.70 0.55 - 1.02 MG/DL    BUN/Creatinine ratio 31 (H) 12 - 20      GFR est AA >60 >60 ml/min/1.73m2    GFR est non-AA >60 >60 ml/min/1.73m2    Calcium 7.3 (L) 8.5 - 10.1 MG/DL   MAGNESIUM   Result Value Ref Range    Magnesium 1.8 1.6 - 2.4 mg/dL   PHOSPHORUS   Result Value Ref Range    Phosphorus 3.2 2.6 - 4.7 MG/DL   HOMOCYSTEINE, PLASMA   Result Value Ref Range    Homocysteine, plasma 4.8 3.7 - 13.9 umol/L   GLUCOSE, POC   Result Value Ref Range    Glucose (POC) 95 65 - 100 mg/dL    Performed by RACHAEL MAHER    EKG, 12 LEAD, INITIAL   Result Value Ref Range    Ventricular Rate 67 BPM    Atrial Rate 67 BPM    P-R Interval 134 ms    QRS Duration 86 ms    Q-T Interval 404 ms    QTC Calculation (Bezet) 426 ms    Calculated P Axis 76 degrees    Calculated R Axis 44 degrees    Calculated T Axis 77 degrees Diagnosis       Normal sinus rhythm  Septal infarct (cited on or before 17-MAR-2019)  Abnormal ECG  When compared with ECG of 09-JUL-2019 05:35,  No significant change was found  Confirmed by Yumber, M.D., Arpita Ace ([id number]) on 2/5/2021 11:01:33 AM     ECHO ADULT COMPLETE   Result Value Ref Range    IVSd 0.96 (A) 0.60 - 0.90 cm    LVIDd 3.06 (A) 3.90 - 5.30 cm    LVIDs 2.17 cm    LVOT d 1.99 cm    LVPWd 0.95 (A) 0.60 - 0.90 cm    LVOT Peak Gradient 3.54 mmHg    LVOT Peak Gradient 4.33 mmHg    Left Ventricular Outflow Tract Mean Gradient 1.80 mmHg    LVOT SV 69.7 mL    LVOT Peak Velocity 94.13 cm/s    LVOT Peak Velocity 104.02 cm/s    LVOT VTI 22.39 cm    RVIDd 3.38 cm    Left Atrium Major Axis 2.29 cm    LA Volume 33.82 22.0 - 52.0 mL    LA Vol 2C 18.39 (A) 22.00 - 52.00 mL    LA Vol 4C 41.09 22.00 - 52.00 mL    Aortic Valve Area by Continuity of Peak Velocity 1.89 cm2    Aortic Valve Area by Continuity of Peak Velocity 2.08 cm2    Aortic Valve Area by Continuity of Peak Velocity 2.08 cm2    Aortic Valve Area by Continuity of Peak Velocity 1.88 cm2    Aortic Valve Area by Continuity of VTI 1.83 cm2    AV R PG 79.77 mmHg    Aortic Regurgitant Pressure Half-time 555.46 ms    AR Max Joce 446.42 cm/s    AoV PG 11.62 mmHg    AoV PG 9.69 mmHg    Aortic Valve Systolic Mean Gradient 6.23 mmHg    Aortic Valve Systolic Peak Velocity 177.67 cm/s    Aortic Valve Systolic Peak Velocity 583.79 cm/s    AoV VTI 38.18 cm    MV A Joce 93.83 cm/s    Mitral Valve E Wave Deceleration Time 246.53 ms    MV E Joce 80.64 cm/s    Pulmonic Regurgitant End Max Velocity 84.99 cm/s    Pulmonic Valve Systolic Peak Instantaneous Gradient 2.13 mmHg    Triscuspid Valve Regurgitation Peak Gradient 22.75 mmHg    TR Max Velocity 238.47 cm/s    Ao Root D 2.86 cm    MV E/A 0.86     LV Mass AL 78.9 67.0 - 162.0 g    LV Mass AL Index 58.0 43.0 - 95.0 g/m2    Left Atrium Minor Axis 1.68 cm    LA Vol Index 24.86 16.00 - 28.00 ml/m2    LA Vol Index 13.52 16.00 - 28.00 ml/m2    LA Vol Index 30.21 16.00 - 28.00 ml/m2    MANOJ/BSA VTI 1.3 cm2/m2        Exam:  Visit Vitals  /70   Pulse 82   Ht 5' 2\" (1.575 m)   Wt 97 lb 3.2 oz (44.1 kg)   SpO2 97%   BMI 17.78 kg/m²     General:  Alert, cooperative, no distress. Head:  Normocephalic, without obvious abnormality, atraumatic. Respiratory:  Heart:   Non labored breathing  Regular rate and rhythm, no murmurs   Neck:      Extremities: Warm, no cyanosis or edema. Pulses: 2+ radial pulses. Neurologic:  MS: Alert and oriented, speech intact. Language- no word finding, able to follow all commands. .Attention and fund of knowledge appropriate. Recent and remote memory impaired to portions of history, has good memory of certain details. Cranial Nerves:  II: visual fields    II: pupils    II: optic disc    III,VII: ptosis none   III,IV,VI: extraocular muscles  EOMI, no nystagmus or diplopia   V: facial light touch sensation     VII: facial muscle function   symmetric   VIII: hearing intact   IX: soft palate elevation     XI: trapezius strength     XI: sternocleidomastoid strength    XII: tongue       Motor: normal bulk and tone, no tremor, Strength: 5/5 , bicep, HF, no PD  Sensory:   Coordination:  Gait: Steady with rolling walker, takes extra steps to turn.    Reflexes:     Mini Mental State Exam 6/29/2020   What is the Year 1   What is the Season 1   What is the Date 1   What is the Day 1   What is the Month 1   Where are we State 1   Where are we Country 1   Where are we [place] or [place] 1   Where are we Floor 1   Name three objects, then ask the patient to say them 3   Serial sevens Subtract 7 from 100 in increments 5   Ask for the three objects repeated above 3   Name a pencil 1   Name a watch 1   Have the patient repeat this phrase \"No ifs, ands, or buts\" 1   Three stage command: Take the paper in your right hand 1   Fold the paper in half 1   Put the paper on the floor 1   Read and obey the following: CLOSE YOUR EYES 1 Have the patient write a sentence 1   Have the patient copy a figure 0   Mini Mental Score 29          Assessment/Plan   Pt is a 80 y.o. right handed female with CAA, diagnosed after hospitalized 3/18/19 - 3/21/19, presenting to [facility] with aphasia, right sided weakness, s/p IV TPA with subsequent SAH and IPH. 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 6/29/20 MMSE score 29/30 unable to copy, slightly unsteady gait. Admitted to [facility] 2/4/21 - 2/5/21 again for word finding and right facial droop. MRI brain 2/5/21 was neg for acute stroke and pt was started on ASA 81mg daily. LDL was 99, started on Lipitor 40mg daily. Exam is stable, clear memory impairment to history, steady gait with walker. We reviewed the diagnosis of CAA and risk of cerebral hemorrhage. MRI brain was reviewed with pt and her daughter. Discussed association of dementia with CAA. Continue Aricept 10mg daily. Discussed the two TIAs with aphasia and right side weakness, and it seems benefit of ischemic stroke prevention with ASA 81mg daily outweighs the risk of hemorrhage. Encouraged to restart ASA 81mg daily. Discussed stroke risk factors and use of a statin to prevent stroke. Encouraged her to restart Lipitor, goal LDL is <70, could possibly have dose lowered to 10 or 20mg at this time, she will d/w PCP. Discuss frequent naps, could be age related, but also due to boredom and/or depression. She is on fluoxetine 5mg daily, suggested she discuss increasing to 10mg daily with PCP. Pointed out to pt that she has a very steady gait with use of her walker, encouraged to use walker at all time. F/u in clinic in 6 months, instructed to call in the interim if needed. ICD-10-CM ICD-9-CM    1. Cerebral amyloid angiopathy (CODE)  I68.0 277.39      437.9    2.  TIA (transient ischemic attack)  G45.9 435.9      I spent a total of 35 minutes in both face-to-face activities and non-face-to-face activities for the visit on the date of this encounter. Signed:   García Fernandez MD  5/25/2021

## 2021-05-25 NOTE — LETTER
5/26/2021 Patient: Donna Gray YOB: 1928 Date of Visit: 5/25/2021 Trent Juarez MD 
[address] [facility] [address] [facility] [place] [address] [id number] Via Fax: [phone] Dear Trent Juarez MD, Thank you for referring Ms. Alexus Miles to [address] [place] [address] for evaluation. My notes for this consultation are attached. If you have questions, please do not hesitate to call me. I look forward to following your patient along with you. Sincerely, Orville Choi MD

## 2021-06-02 ENCOUNTER — HOSPITAL ENCOUNTER (INPATIENT)
Age: 86
LOS: 1 days | Discharge: HOME | DRG: 439 | End: 2021-06-03
Attending: STUDENT IN AN ORGANIZED HEALTH CARE EDUCATION/TRAINING PROGRAM | Admitting: FAMILY MEDICINE
Payer: MEDICARE

## 2021-06-02 ENCOUNTER — APPOINTMENT (OUTPATIENT)
Dept: CT IMAGING | Age: 86
DRG: 439 | End: 2021-06-02
Attending: STUDENT IN AN ORGANIZED HEALTH CARE EDUCATION/TRAINING PROGRAM
Payer: MEDICARE

## 2021-06-02 DIAGNOSIS — K85.90 ACUTE PANCREATITIS, UNSPECIFIED COMPLICATION STATUS, UNSPECIFIED PANCREATITIS TYPE: Primary | ICD-10-CM

## 2021-06-02 DIAGNOSIS — E87.1 HYPONATREMIA: ICD-10-CM

## 2021-06-02 LAB
ALBUMIN SERPL-MCNC: 3.7 G/DL (ref 3.5–5)
ALBUMIN/GLOB SERPL: 1 {RATIO} (ref 1.1–2.2)
ALP SERPL-CCNC: 63 U/L (ref 45–117)
ALT SERPL-CCNC: 25 U/L (ref 12–78)
ANION GAP SERPL CALC-SCNC: 7 MMOL/L (ref 5–15)
AST SERPL-CCNC: 28 U/L (ref 15–37)
BASOPHILS # BLD: 0 K/UL (ref 0–0.1)
BASOPHILS NFR BLD: 0 % (ref 0–1)
BILIRUB SERPL-MCNC: 0.8 MG/DL (ref 0.2–1)
BUN SERPL-MCNC: 13 MG/DL (ref 6–20)
BUN/CREAT SERPL: 20 (ref 12–20)
CALCIUM SERPL-MCNC: 8.7 MG/DL (ref 8.5–10.1)
CHLORIDE SERPL-SCNC: 97 MMOL/L (ref 97–108)
CO2 SERPL-SCNC: 26 MMOL/L (ref 21–32)
COMMENT, HOLDF: NORMAL
CREAT SERPL-MCNC: 0.65 MG/DL (ref 0.55–1.02)
DIFFERENTIAL METHOD BLD: ABNORMAL
EOSINOPHIL # BLD: 0 K/UL (ref 0–0.4)
EOSINOPHIL NFR BLD: 0 % (ref 0–7)
ERYTHROCYTE [DISTWIDTH] IN BLOOD BY AUTOMATED COUNT: 12.1 % (ref 11.5–14.5)
GLOBULIN SER CALC-MCNC: 3.6 G/DL (ref 2–4)
GLUCOSE SERPL-MCNC: 105 MG/DL (ref 65–100)
HCT VFR BLD AUTO: 40.2 % (ref 35–47)
HGB BLD-MCNC: 13.8 G/DL (ref 11.5–16)
IMM GRANULOCYTES # BLD AUTO: 0 K/UL (ref 0–0.04)
IMM GRANULOCYTES NFR BLD AUTO: 0 % (ref 0–0.5)
LACTATE SERPL-SCNC: 1.1 MMOL/L (ref 0.4–2)
LIPASE SERPL-CCNC: >3000 U/L (ref 73–393)
LYMPHOCYTES # BLD: 1 K/UL (ref 0.8–3.5)
LYMPHOCYTES NFR BLD: 20 % (ref 12–49)
MAGNESIUM SERPL-MCNC: 2 MG/DL (ref 1.6–2.4)
MCH RBC QN AUTO: 31.9 PG (ref 26–34)
MCHC RBC AUTO-ENTMCNC: 34.3 G/DL (ref 30–36.5)
MCV RBC AUTO: 92.8 FL (ref 80–99)
MONOCYTES # BLD: 0.5 K/UL (ref 0–1)
MONOCYTES NFR BLD: 11 % (ref 5–13)
NEUTS SEG # BLD: 3.2 K/UL (ref 1.8–8)
NEUTS SEG NFR BLD: 69 % (ref 32–75)
NRBC # BLD: 0 K/UL (ref 0–0.01)
NRBC BLD-RTO: 0 PER 100 WBC
PLATELET # BLD AUTO: 183 K/UL (ref 150–400)
PMV BLD AUTO: 8.7 FL (ref 8.9–12.9)
POTASSIUM SERPL-SCNC: 3.8 MMOL/L (ref 3.5–5.1)
PROT SERPL-MCNC: 7.3 G/DL (ref 6.4–8.2)
RBC # BLD AUTO: 4.33 M/UL (ref 3.8–5.2)
SAMPLES BEING HELD,HOLD: NORMAL
SODIUM SERPL-SCNC: 130 MMOL/L (ref 136–145)
TROPONIN I SERPL-MCNC: <0.05 NG/ML
WBC # BLD AUTO: 4.8 K/UL (ref 3.6–11)

## 2021-06-02 PROCEDURE — 96360 HYDRATION IV INFUSION INIT: CPT

## 2021-06-02 PROCEDURE — 74011250636 HC RX REV CODE- 250/636: Performed by: FAMILY MEDICINE

## 2021-06-02 PROCEDURE — 77030038269 HC DRN EXT URIN PURWCK BARD -A

## 2021-06-02 PROCEDURE — 93005 ELECTROCARDIOGRAM TRACING: CPT

## 2021-06-02 PROCEDURE — 80053 COMPREHEN METABOLIC PANEL: CPT

## 2021-06-02 PROCEDURE — 65270000029 HC RM PRIVATE

## 2021-06-02 PROCEDURE — 74011000250 HC RX REV CODE- 250: Performed by: FAMILY MEDICINE

## 2021-06-02 PROCEDURE — 74176 CT ABD & PELVIS W/O CONTRAST: CPT

## 2021-06-02 PROCEDURE — 83690 ASSAY OF LIPASE: CPT

## 2021-06-02 PROCEDURE — 83605 ASSAY OF LACTIC ACID: CPT

## 2021-06-02 PROCEDURE — 84484 ASSAY OF TROPONIN QUANT: CPT

## 2021-06-02 PROCEDURE — 77030040361 HC SLV COMPR DVT MDII -B

## 2021-06-02 PROCEDURE — 74011250636 HC RX REV CODE- 250/636: Performed by: INTERNAL MEDICINE

## 2021-06-02 PROCEDURE — 83735 ASSAY OF MAGNESIUM: CPT

## 2021-06-02 PROCEDURE — 74011250637 HC RX REV CODE- 250/637: Performed by: FAMILY MEDICINE

## 2021-06-02 PROCEDURE — 74011250636 HC RX REV CODE- 250/636: Performed by: STUDENT IN AN ORGANIZED HEALTH CARE EDUCATION/TRAINING PROGRAM

## 2021-06-02 PROCEDURE — 36415 COLL VENOUS BLD VENIPUNCTURE: CPT

## 2021-06-02 PROCEDURE — 99285 EMERGENCY DEPT VISIT HI MDM: CPT

## 2021-06-02 PROCEDURE — 85025 COMPLETE CBC W/AUTO DIFF WBC: CPT

## 2021-06-02 RX ORDER — HYDROMORPHONE HYDROCHLORIDE 1 MG/ML
0.5 INJECTION, SOLUTION INTRAMUSCULAR; INTRAVENOUS; SUBCUTANEOUS
Status: DISCONTINUED | OUTPATIENT
Start: 2021-06-02 | End: 2021-06-03 | Stop reason: HOSPADM

## 2021-06-02 RX ORDER — LEVOTHYROXINE SODIUM 50 UG/1
25 TABLET ORAL
Status: DISCONTINUED | OUTPATIENT
Start: 2021-06-03 | End: 2021-06-03 | Stop reason: HOSPADM

## 2021-06-02 RX ORDER — PROCHLORPERAZINE EDISYLATE 5 MG/ML
10 INJECTION INTRAMUSCULAR; INTRAVENOUS
Status: DISCONTINUED | OUTPATIENT
Start: 2021-06-02 | End: 2021-06-03 | Stop reason: HOSPADM

## 2021-06-02 RX ORDER — GUAIFENESIN 100 MG/5ML
81 LIQUID (ML) ORAL DAILY
Status: DISCONTINUED | OUTPATIENT
Start: 2021-06-03 | End: 2021-06-03 | Stop reason: HOSPADM

## 2021-06-02 RX ORDER — CYCLOSPORINE 0.5 MG/ML
1 EMULSION OPHTHALMIC EVERY 12 HOURS
Status: DISCONTINUED | OUTPATIENT
Start: 2021-06-02 | End: 2021-06-03 | Stop reason: HOSPADM

## 2021-06-02 RX ORDER — ACETAMINOPHEN 650 MG/1
650 SUPPOSITORY RECTAL
Status: DISCONTINUED | OUTPATIENT
Start: 2021-06-02 | End: 2021-06-03 | Stop reason: HOSPADM

## 2021-06-02 RX ORDER — POLYETHYLENE GLYCOL 3350 17 G/17G
17 POWDER, FOR SOLUTION ORAL DAILY PRN
Status: DISCONTINUED | OUTPATIENT
Start: 2021-06-02 | End: 2021-06-03 | Stop reason: HOSPADM

## 2021-06-02 RX ORDER — ACETAMINOPHEN 325 MG/1
650 TABLET ORAL
Status: DISCONTINUED | OUTPATIENT
Start: 2021-06-02 | End: 2021-06-03 | Stop reason: HOSPADM

## 2021-06-02 RX ORDER — SODIUM CHLORIDE 9 MG/ML
75 INJECTION, SOLUTION INTRAVENOUS CONTINUOUS
Status: DISCONTINUED | OUTPATIENT
Start: 2021-06-02 | End: 2021-06-02

## 2021-06-02 RX ORDER — DONEPEZIL HYDROCHLORIDE 5 MG/1
10 TABLET, FILM COATED ORAL DAILY
Status: DISCONTINUED | OUTPATIENT
Start: 2021-06-03 | End: 2021-06-02

## 2021-06-02 RX ORDER — ATORVASTATIN CALCIUM 20 MG/1
40 TABLET, FILM COATED ORAL
Status: DISCONTINUED | OUTPATIENT
Start: 2021-06-02 | End: 2021-06-03 | Stop reason: HOSPADM

## 2021-06-02 RX ORDER — FLUOXETINE HYDROCHLORIDE 20 MG/5ML
5 LIQUID ORAL DAILY
Status: DISCONTINUED | OUTPATIENT
Start: 2021-06-03 | End: 2021-06-03 | Stop reason: HOSPADM

## 2021-06-02 RX ORDER — MORPHINE SULFATE 2 MG/ML
1 INJECTION, SOLUTION INTRAMUSCULAR; INTRAVENOUS
Status: DISCONTINUED | OUTPATIENT
Start: 2021-06-02 | End: 2021-06-02

## 2021-06-02 RX ORDER — SODIUM CHLORIDE 0.9 % (FLUSH) 0.9 %
5-40 SYRINGE (ML) INJECTION AS NEEDED
Status: DISCONTINUED | OUTPATIENT
Start: 2021-06-02 | End: 2021-06-03 | Stop reason: HOSPADM

## 2021-06-02 RX ORDER — SODIUM CHLORIDE, SODIUM LACTATE, POTASSIUM CHLORIDE, CALCIUM CHLORIDE 600; 310; 30; 20 MG/100ML; MG/100ML; MG/100ML; MG/100ML
100 INJECTION, SOLUTION INTRAVENOUS CONTINUOUS
Status: DISCONTINUED | OUTPATIENT
Start: 2021-06-02 | End: 2021-06-03 | Stop reason: HOSPADM

## 2021-06-02 RX ORDER — ONDANSETRON 4 MG/1
4 TABLET, ORALLY DISINTEGRATING ORAL
Status: DISCONTINUED | OUTPATIENT
Start: 2021-06-02 | End: 2021-06-03 | Stop reason: HOSPADM

## 2021-06-02 RX ORDER — DONEPEZIL HYDROCHLORIDE 5 MG/1
10 TABLET, FILM COATED ORAL
Status: DISCONTINUED | OUTPATIENT
Start: 2021-06-02 | End: 2021-06-03 | Stop reason: HOSPADM

## 2021-06-02 RX ORDER — DORZOLAMIDE HCL 20 MG/ML
1 SOLUTION/ DROPS OPHTHALMIC 3 TIMES DAILY
Status: DISCONTINUED | OUTPATIENT
Start: 2021-06-02 | End: 2021-06-03 | Stop reason: HOSPADM

## 2021-06-02 RX ORDER — DONEPEZIL HYDROCHLORIDE 10 MG/1
10 TABLET, FILM COATED ORAL
COMMUNITY
End: 2021-07-19

## 2021-06-02 RX ORDER — TIMOLOL MALEATE 5 MG/ML
1 SOLUTION/ DROPS OPHTHALMIC 2 TIMES DAILY
Status: DISCONTINUED | OUTPATIENT
Start: 2021-06-02 | End: 2021-06-03 | Stop reason: HOSPADM

## 2021-06-02 RX ORDER — SODIUM CHLORIDE 0.9 % (FLUSH) 0.9 %
5-40 SYRINGE (ML) INJECTION EVERY 8 HOURS
Status: DISCONTINUED | OUTPATIENT
Start: 2021-06-02 | End: 2021-06-03 | Stop reason: HOSPADM

## 2021-06-02 RX ORDER — ONDANSETRON 2 MG/ML
4 INJECTION INTRAMUSCULAR; INTRAVENOUS
Status: DISCONTINUED | OUTPATIENT
Start: 2021-06-02 | End: 2021-06-03 | Stop reason: HOSPADM

## 2021-06-02 RX ADMIN — DORZOLAMIDE HYDROCHLORIDE 1 DROP: 20 SOLUTION/ DROPS OPHTHALMIC at 21:07

## 2021-06-02 RX ADMIN — SODIUM CHLORIDE 75 ML/HR: 9 INJECTION, SOLUTION INTRAVENOUS at 15:52

## 2021-06-02 RX ADMIN — DORZOLAMIDE HYDROCHLORIDE 1 DROP: 20 SOLUTION/ DROPS OPHTHALMIC at 18:51

## 2021-06-02 RX ADMIN — CYCLOSPORINE 1 DROP: 0.5 EMULSION OPHTHALMIC at 21:07

## 2021-06-02 RX ADMIN — DONEPEZIL HYDROCHLORIDE 10 MG: 5 TABLET, FILM COATED ORAL at 21:06

## 2021-06-02 RX ADMIN — ATORVASTATIN CALCIUM 40 MG: 20 TABLET, FILM COATED ORAL at 21:06

## 2021-06-02 RX ADMIN — TIMOLOL MALEATE 1 DROP: 5 SOLUTION OPHTHALMIC at 18:51

## 2021-06-02 RX ADMIN — SODIUM CHLORIDE, POTASSIUM CHLORIDE, SODIUM LACTATE AND CALCIUM CHLORIDE 100 ML/HR: 600; 310; 30; 20 INJECTION, SOLUTION INTRAVENOUS at 23:37

## 2021-06-02 RX ADMIN — Medication 10 ML: at 21:07

## 2021-06-02 RX ADMIN — Medication 10 ML: at 15:55

## 2021-06-02 RX ADMIN — SODIUM CHLORIDE 1000 ML: 9 INJECTION, SOLUTION INTRAVENOUS at 12:17

## 2021-06-02 NOTE — PROGRESS NOTES
3:54 PM  CM reviewed EMR and met with patient in room to complete the initial evaluation. Confirmed charted demographics. Reason for Admission:  Acute Pancreatitis                     RUR Score:    Not calculated yet       PAYOR:  [place] and [facility] [address] for utilizing home health: Has used in the past, cannot recall name of agency         PCP: First and Last name:  Nish Rivers MD     [facility]: [facility]   Are you a current patient: Yes/No: Yes   Approximate date of last visit: Within the last week   Can you participate in a virtual visit with your PCP:                     Current Advanced Directive/Advance Care Plan: DNR      Healthcare Decision Maker:   Click here to complete [employer] including selection of the Healthcare Decision Maker Relationship (ie \"Primary\")  [employer]- [phone]                         Transition of Care Plan:                    Home:  Resides in [address] [place] [address] in an apartment with an elevator  DME:  RW and shower chair  PTA:  Independent with all ADL's and iADL's- goes to meals at [address] [place] [address] but also makes meals in her home  RX:  [facility]- son picks up medications for her as patient no longer drives    Care Management Interventions  PCP Verified by CM: Yes (within the last week)  Mode of Transport at Discharge: Other (see comment) (family will provide transport at [facility])  Transition of Care Consult (CM Consult): Discharge Planning  Discharge Durable Medical Equipment: No  Physical Therapy Consult: Yes  Occupational Therapy Consult: Yes  Speech Therapy Consult: No  Current Support Network:  Other (resides at [address] [place] [address] independent living)  Confirm Follow Up Transport: Family  Discharge Location  Discharge Placement: Unable to determine at this time

## 2021-06-02 NOTE — PROGRESS NOTES
[facility]: MED RECONCILIATION    Comments/Recommendations:     Patient able to confirm name, , allergies and preferred pharmacy    Medications added:     none    Medications removed:    none    Medications adjusted:    none    Information obtained from: RX Query/Pharmacy records    Allergies: Patient has no known allergies. Prior to Admission Medications:     Prior to Admission Medications   Prescriptions Last Dose Informant Patient Reported? Taking? FLUoxetine (PROzac) 10 mg tablet 2021 at Unknown time  Yes Yes   Sig: TAKE 1/2 TABLET BY MOUTH EVERY MORNING WITH BREAKFAST   aspirin 81 mg chewable tablet 2021 at Unknown time  No Yes   Sig: Take 1 Tab by mouth daily. atorvastatin (LIPITOR) 40 mg tablet 2021 at Unknown time  No Yes   Sig: Take 1 Tab by mouth nightly. brimonidine-timolol (COMBIGAN) 0.2-0.5 % Drop ophthalmic solution 2021 at Unknown time Self Yes Yes   Sig: Administer 1 Drop to right eye every twelve (12) hours. brinzolamide (AZOPT) 1 % ophthalmic suspension 2021 at Unknown time Self Yes Yes   Sig: Administer 1 Drop to right eye two (2) times a day. cycloSPORINE (RESTASIS) 0.05 % ophthalmic emulsion 2021 at Unknown time Self Yes Yes   Sig: Administer 1 Drop to both eyes two (2) times a day. donepeziL (ARICEPT) 10 mg tablet 2021 at Unknown time  Yes Yes   Sig: Take 10 mg by mouth nightly. levothyroxine (SYNTHROID) 25 mcg tablet 2021 at Unknown time Self No Yes   Sig: TAKE 1 TABLET IN THE MORNING BEFORE BREAKFAST      Facility-Administered Medications: None         Fadi Beth Pharm. D.    Clinical Staff Pharmacist  [address] [place] [address]  [phone]

## 2021-06-02 NOTE — ED NOTES
TRANSFER - OUT REPORT:    Verbal report given to 5th floor on Ismael Mac  being transferred to [phone] for routine progression of care       Report consisted of patients Situation, Background, Assessment and   Recommendations(SBAR). Information from the following report(s) SBAR, ED Summary, [facility] and Recent Results was reviewed with the receiving nurse. Opportunity for questions and clarification was provided.

## 2021-06-02 NOTE — ED TRIAGE NOTES
Patient arrives via ems from [facility] with co lower abdominal discomfort x 2 days. Reports nausea and diarrhea.

## 2021-06-02 NOTE — ED PROVIDER NOTES
The patient is a 63-year-old female history of cerebral amyloid angiopathy, prior CVA and hypothyroidism presenting today secondary to abdominal discomfort. She is a poor historian, asked me to speak with her daughter because her memory is bad. Per EMS report she has had 2 days of abdominal pain. Patient describes a \"pressure\" in the abdomen. She has had dry heaves without vomiting and several episodes of frequent loose stools without copious bloody diarrhea. She feels generally weak and has noticed twitching of the legs and feet. She has not had fever, chest pain or shortness of breath. Past Medical History:   Diagnosis Date    Cerebral amyloid angiopathy (HCC)     CVA (cerebral vascular accident) ([facility] [address] 03/2019    aphasia and right sided weakness, s/p IV TPA with subsequent SAH/IPH, found to have change c/w CAA    Hypothyroid        Past Surgical History:   Procedure Laterality Date    HX BACK SURGERY      HX HIP REPLACEMENT      right    HX HYSTERECTOMY      HX TONSILLECTOMY           Family History:   Problem Relation Age of Onset    Dementia Mother         Dec 96yo, had memory loss for the last few years. Social History     Socioeconomic History    Marital status:      Spouse name: Not on file    Number of children: Not on file    Years of education: Not on file    Highest education level: Not on file   Occupational History    Not on file   Tobacco Use    Smoking status: Never Smoker    Smokeless tobacco: Never Used   Substance and Sexual Activity    Alcohol use:  Yes     Alcohol/week: 0.8 standard drinks     Types: 1 Glasses of wine per week     Comment: rarely    Drug use: No    Sexual activity: Not Currently   Other Topics Concern    Not on file   Social History Narrative    Not on file     Social Determinants of Health     Financial Resource Strain:     Difficulty of Paying Living Expenses:    Food Insecurity:     Worried About Running Out of Food in the Last Year:     Ran Out of Food in the Last Year:    Transportation Needs:     Lack of Transportation (Medical):  Lack of Transportation (Non-Medical):    Physical Activity:     Days of Exercise per Week:     Minutes of Exercise per Session:    Stress:     Feeling of Stress :    Social Connections:     Frequency of Communication with Friends and Family:     Frequency of Social Gatherings with Friends and Family:     Attends Christianity Services:     Active Member of Clubs or Organizations:     Attends Club or Organization Meetings:     Marital Status:    Intimate Partner Violence:     Fear of Current or Ex-Partner:     Emotionally Abused:     Physically Abused:     Sexually Abused: ALLERGIES: Patient has no known allergies. Review of Systems   Constitutional: Negative for chills and fever. HENT: Negative for congestion and rhinorrhea. Eyes: Negative for redness and visual disturbance. Respiratory: Negative for cough and shortness of breath. Cardiovascular: Negative for chest pain and leg swelling. Gastrointestinal: Positive for abdominal pain, diarrhea and nausea. Negative for vomiting. Genitourinary: Negative for dysuria, flank pain, frequency, hematuria and urgency. Musculoskeletal: Negative for arthralgias, back pain, myalgias and neck pain. Skin: Negative for rash and wound. Allergic/Immunologic: Negative for immunocompromised state. Neurological: Negative for dizziness and headaches. Vitals:    06/02/21 1204   BP: (!) 148/55   Pulse: 60   Resp: 17   Temp: 97.9 °F (36.6 °C)   SpO2: 99%   Weight: 41.7 kg (92 lb)   Height: 5' 1\" (1.549 m)            Physical Exam  Vitals and nursing note reviewed. Constitutional:       General: She is not in acute distress. Appearance: She is well-developed. She is not diaphoretic. HENT:      Head: Normocephalic. Mouth/Throat:      Mouth: Mucous membranes are dry. Pharynx: No oropharyngeal exudate.    Eyes: General:         Right eye: No discharge. Left eye: No discharge. Pupils: Pupils are equal, round, and reactive to light. Cardiovascular:      Rate and Rhythm: Normal rate and regular rhythm. Heart sounds: Normal heart sounds. No murmur heard. No friction rub. No gallop. Pulmonary:      Effort: Pulmonary effort is normal. No respiratory distress. Breath sounds: Normal breath sounds. No stridor. No wheezing or rales. Abdominal:      General: Bowel sounds are normal. There is no distension. Palpations: Abdomen is soft. Tenderness: There is abdominal tenderness (mild diffuse). There is no guarding or rebound. Musculoskeletal:         General: No deformity. Normal range of motion. Cervical back: Normal range of motion and neck supple. Comments: Intermittent fasciculations of calf muscles   Skin:     General: Skin is warm and dry. Capillary Refill: Capillary refill takes less than 2 seconds. Findings: No rash. Neurological:      Mental Status: She is alert and oriented to person, place, and time. Psychiatric:         Behavior: Behavior normal.         EKG Interpretation:   ED Physician interpretation  Normal sinus rhythm, rate of 60, normal axis, normal intervals, nonspecific ST-T wave changes without elevations or depressions    Labs Reviewed:   Lactic acid not elevated  Troponin normal  No leukocytosis or anemia  Hyponatremic at 130  Mildly hyperglycemia  Creatinine negative  Electrolytes otherwise normal including calcium  Lipase over 3000 with otherwise normal LFTs and bilirubin      Imaging Reviewed:   CT abdomen shows no acute process      Course:  IV fluids given    Long discussion with patient and daughter regarding work-up and working diagnosis    Perfect Serve Consult for Admission  1:40 PM    ED Room Number: ER18/18  Patient Name and age:  Denys Castillo 80 y.o.  female  Working Diagnosis:   1.  Acute pancreatitis, unspecified complication status, unspecified pancreatitis type        COVID-19 Suspicion:  no  Sepsis present:  no  Reassessment needed: no  Code Status:  Full Code  Readmission: no  Isolation Requirements:  no  Recommended Level of Care:  med/surg  Department:[facility] ED - [phone]  Other:  80 y.o. female here with diarrhea and abd pain, lipase >3000 without any findings on CT. No etoh use. Vs stable. Labs otherwise ok. MDM: Patient is a 40-year-old female presenting today with abdominal discomfort and diarrhea with some nausea. Lab work indicative of acute pancreatitis although no clear etiology. She has no significant risk factors such as alcohol abuse or recent procedures. No stones seen on CAT scan and bilirubin negative. I suspect she will need MRCP versus ERCP to further delineate etiology of this. Her vital signs are stable and her lab work is otherwise acceptable. She will be admitted to medicine for further management. Clinical Impression:     ICD-10-CM ICD-9-CM    1. Acute pancreatitis, unspecified complication status, unspecified pancreatitis type  K85.90 577.0    2.  Hyponatremia  E87.1 276.1            Disposition: Admit  Moise Dillon DO

## 2021-06-02 NOTE — PROGRESS NOTES
TRANSFER - IN REPORT:    Verbal report received from  Tesha HAYNES(name) on [facility]  being received from ED(unit) for routine progression of care      Report consisted of patients Situation, Background, Assessment and   Recommendations(SBAR). Information from the following report(s) SBAR, Kardex, ED Summary and MAR    was reviewed with the receiving nurse. Opportunity for questions and clarification was provided. Assessment completed upon patients arrival to unit and care assumed.

## 2021-06-02 NOTE — H&P
[address] Adult  Hospitalist Group    History & Physical    Date of service: 6/2/2021    Patient name: Caroline Zimmerman  MRN: [id number]  YOB: 1928  Age: 80 y.o. Primary care provider:  Marlys Hernadez MD     Source of Information: patient, medical records                               Chief complain: abdominal pain    History of present illness  Caroline Zimmerman is a 80 y.o. female who presented with abdominal pain that started 2 days ago. She also has been having loose stools, nausea, and dry heaves. She denies fevers but has been feeling hot/cold off and on. Eating does not make the pain worse. According to her daughter she doesn't eat much to begin with and doesn't drink much either. She has been feeling more tired than usual and has been sleeping most of the day. Her daughter has been staying with her in independent living at [address] [facility]. Past Medical History:   Diagnosis Date    Cerebral amyloid angiopathy (HCC)     CVA (cerebral vascular accident) ([facility] [address] 03/2019    aphasia and right sided weakness, s/p IV TPA with subsequent SAH/IPH, found to have change c/w CAA    Hypothyroid       Past Surgical History:   Procedure Laterality Date    HX BACK SURGERY      HX HIP REPLACEMENT      right    HX HYSTERECTOMY      HX TONSILLECTOMY       Prior to Admission medications    Medication Sig Start Date End Date Taking? Authorizing Provider   FLUoxetine (PROzac) 10 mg tablet TAKE 1/2 TABLET BY MOUTH EVERY MORNING WITH BREAKFAST 4/29/21   Provider, Historical   aspirin 81 mg chewable tablet Take 1 Tab by mouth daily. Patient not taking: Reported on 5/25/2021 2/6/21   Beverly Best MD   atorvastatin (LIPITOR) 40 mg tablet Take 1 Tab by mouth nightly.   Patient not taking: Reported on 5/25/2021 2/5/21   Beverly Best MD   donepeziL (Aricept) 10 mg tablet Take 1/2 tab by mouth x 1 month, then take 1 tab by mouth daily 6/29/20   Ashish Hong MD   levothyroxine (SYNTHROID) 25 mcg tablet TAKE 1 TABLET IN THE MORNING BEFORE BREAKFAST  Patient not taking: Reported on 5/25/2021 5/14/14   Mansi Coley MD   cycloSPORINE (RESTASIS) 0.05 % ophthalmic emulsion Administer 1 Drop to both eyes two (2) times a day. Provider, Historical   brinzolamide (AZOPT) 1 % ophthalmic suspension Administer 1 Drop to right eye two (2) times a day. Provider, Historical   brimonidine-timolol (COMBIGAN) 0.2-0.5 % Drop ophthalmic solution Administer 1 Drop to right eye every twelve (12) hours. Provider, Historical     No Known Allergies   Family History   Problem Relation Age of Onset    Dementia Mother         Dec 96yo, had memory loss for the last few years. Social history    Social History     Tobacco Use   Smoking Status Never Smoker   Smokeless Tobacco Never Used       Social History     Substance and Sexual Activity   Alcohol Use Yes    Alcohol/week: 0.8 standard drinks    Types: 1 Glasses of wine per week    Comment: rarely       Code status  Code status discussed with the patient/caregivers. DNR    Review of systems    A comprehensive review of systems was negative except for that written in the History of Present Illness. Physical Examination   Visit Vitals  BP (!) 148/55 (BP 1 Location: Left upper arm, BP Patient Position: At rest)   Pulse 60   Temp 97.9 °F (36.6 °C)   Resp 17   Ht 5' 1\" (1.549 m)   Wt 41.7 kg (92 lb)   SpO2 99%   BMI 17.38 kg/m²          O2 Device: None (Room air)    General:  Alert, cooperative, no distress   Head:  Normocephalic, without obvious abnormality, atraumatic   Eyes:  Conjunctivae/corneas clear.  PERRL, EOMs intact   Head/Neck: Nares normal. No nasal drainage or sinus tenderness  Lips, mucosa, and tongue normal   Teeth and gums normal  Clear oropharynx  Trachea midline  No palpable adenopathy  No thyroid enlargement, tenderness     Lungs:   Symmetrical chest expansion and respiratory effort  Clear to auscultation bilaterally       Heart:  Regular rhythm Sounds normal; no murmur, rub or gallop   Abdomen:   Soft, no tenderness  Bowel sounds normal  No masses or hepatosplenomegaly     Back: No CVA tenderness   Extremities: Extremities normal, atraumatic  No cyanosis or edema     Skin: No rashes or ulcers   Musculo-      skeletal: Gait not tested  Normal symmetry, ROM, strength and tone   Neuro: Cranial nerves grossly normal     Psych: Alert, oriented x3  Normal affect, judgement and insight     Data Review    24 Hour Results:  Recent Results (from the past 24 hour(s))   CBC WITH AUTOMATED DIFF    Collection Time: 06/02/21 12:19 PM   Result Value Ref Range    WBC 4.8 3.6 - 11.0 K/uL    RBC 4.33 3.80 - 5.20 M/uL    HGB 13.8 11.5 - 16.0 g/dL    HCT 40.2 35.0 - 47.0 %    MCV 92.8 80.0 - 99.0 FL    MCH 31.9 26.0 - 34.0 PG    MCHC 34.3 30.0 - 36.5 g/dL    RDW 12.1 11.5 - 14.5 %    PLATELET 930 301 - 828 K/uL    MPV 8.7 (L) 8.9 - 12.9 FL    NRBC 0.0 0  WBC    ABSOLUTE NRBC 0.00 0.00 - 0.01 K/uL    NEUTROPHILS 69 32 - 75 %    LYMPHOCYTES 20 12 - 49 %    MONOCYTES 11 5 - 13 %    EOSINOPHILS 0 0 - 7 %    BASOPHILS 0 0 - 1 %    IMMATURE GRANULOCYTES 0 0.0 - 0.5 %    ABS. NEUTROPHILS 3.2 1.8 - 8.0 K/UL    ABS. LYMPHOCYTES 1.0 0.8 - 3.5 K/UL    ABS. MONOCYTES 0.5 0.0 - 1.0 K/UL    ABS. EOSINOPHILS 0.0 0.0 - 0.4 K/UL    ABS. BASOPHILS 0.0 0.0 - 0.1 K/UL    ABS. IMM.  GRANS. 0.0 0.00 - 0.04 K/UL    DF AUTOMATED     METABOLIC PANEL, COMPREHENSIVE    Collection Time: 06/02/21 12:19 PM   Result Value Ref Range    Sodium 130 (L) 136 - 145 mmol/L    Potassium 3.8 3.5 - 5.1 mmol/L    Chloride 97 97 - 108 mmol/L    CO2 26 21 - 32 mmol/L    Anion gap 7 5 - 15 mmol/L    Glucose 105 (H) 65 - 100 mg/dL    BUN 13 6 - 20 MG/DL    Creatinine 0.65 0.55 - 1.02 MG/DL    BUN/Creatinine ratio 20 12 - 20      GFR est AA >60 >60 ml/min/1.73m2    GFR est non-AA >60 >60 ml/min/1.73m2    Calcium 8.7 8.5 - 10.1 MG/DL    Bilirubin, total 0.8 0.2 - 1.0 MG/DL    ALT (SGPT) 25 12 - 78 U/L    AST (SGOT) 28 15 - 37 U/L    Alk. phosphatase 63 45 - 117 U/L    Protein, total 7.3 6.4 - 8.2 g/dL    Albumin 3.7 3.5 - 5.0 g/dL    Globulin 3.6 2.0 - 4.0 g/dL    A-G Ratio 1.0 (L) 1.1 - 2.2     SAMPLES BEING HELD    Collection Time: 06/02/21 12:19 PM   Result Value Ref Range    SAMPLES BEING HELD 1SST     COMMENT        Add-on orders for these samples will be processed based on acceptable specimen integrity and analyte stability, which may vary by analyte. LIPASE    Collection Time: 06/02/21 12:19 PM   Result Value Ref Range    Lipase >3,000 (H) 73 - 393 U/L   MAGNESIUM    Collection Time: 06/02/21 12:19 PM   Result Value Ref Range    Magnesium 2.0 1.6 - 2.4 mg/dL   TROPONIN I    Collection Time: 06/02/21 12:19 PM   Result Value Ref Range    Troponin-I, Qt. <0.05 <0.05 ng/mL   LACTIC ACID    Collection Time: 06/02/21  1:31 PM   Result Value Ref Range    Lactic acid 1.1 0.4 - 2.0 MMOL/L     Recent Labs     06/02/21  1219   WBC 4.8   HGB 13.8   HCT 40.2        Recent Labs     06/02/21  1219   *   K 3.8   CL 97   CO2 26   *   BUN 13   CREA 0.65   CA 8.7   MG 2.0   ALB 3.7   TBILI 0.8   ALT 25       Imaging  CT ABD PELV WO CONT    Result Date: 6/2/2021  EXAM:  CT ABD PELV WO CONT INDICATION: Abdominal pain COMPARISON: CT 12/9/2016. TECHNIQUE: Helical CT of the abdomen  and pelvis  without contrast. Coronal and sagittal reformats are performed. CT dose reduction was achieved through use of a standardized protocol tailored for this examination and automatic exposure control for dose modulation. FINDINGS: Solid organ evaluation is limited without contrast. The visualized lung bases demonstrate no mass or consolidation. The heart size is normal. There is no pericardial or pleural effusion. There is no renal, ureteral, or bladder calculus. The kidneys are symmetric without hydronephrosis. There is no perinephric fluid or fat stranding. Scattered small liver and spleen calcifications are again noted.  The pancreas, and adrenal glands are normal.  The gall bladder is present  without intra- or extra-hepatic biliary dilatation. There are no dilated bowel loops. The appendix is not visualized. There are no enlarged lymph nodes. There is no free fluid or free air. The aorta tapers without aneurysm. There is aortoiliac atherosclerosis. The urinary bladder is unremarkable. There is no pelvic mass. The uterus is surgically absent. There is a right hip prosthesis resulting in streak artifact limiting evaluation of the pelvic soft tissues. There are degenerative changes in the spine with anterolisthesis at L4-5. There is no aggressive bony lesion. No acute abnormality in the abdomen or pelvis. Assessment and Plan     Acute pancreatitis  -mild, but will treat with NPO, IVF, prn zofran /morphine  -repeat lipase in am  -lipids    Hyponatremoia  -due to IVVD, poor appetite  -monitor with IVF    Depression/dementia  -cont prozac, aricept    Hypothyroid  -check tsh  -cont LT4    HLD  -cont statin    H/o CVA  -cont asa        Diet: NPO  Activity: as tolerated   DVT prophylaxis: SCDs  Isolation precautions: none       Signed by:  Eliezer Garber MD    June 2, 2021 at 2:24 PM

## 2021-06-02 NOTE — PROGRESS NOTES
Bedside and Verbal shift change report given to Nahomy (oncoming nurse) by [employer] (offgoing nurse). Report included the following information SBAR, Kardex and MAR.

## 2021-06-03 ENCOUNTER — DOCUMENTATION ONLY (OUTPATIENT)
Dept: NEUROLOGY | Age: 86
End: 2021-06-03

## 2021-06-03 VITALS
RESPIRATION RATE: 18 BRPM | SYSTOLIC BLOOD PRESSURE: 161 MMHG | OXYGEN SATURATION: 96 % | HEART RATE: 64 BPM | WEIGHT: 92 LBS | HEIGHT: 61 IN | TEMPERATURE: 98 F | BODY MASS INDEX: 17.37 KG/M2 | DIASTOLIC BLOOD PRESSURE: 68 MMHG

## 2021-06-03 LAB
ALBUMIN SERPL-MCNC: 2.8 G/DL (ref 3.5–5)
ALBUMIN/GLOB SERPL: 0.9 {RATIO} (ref 1.1–2.2)
ALP SERPL-CCNC: 52 U/L (ref 45–117)
ALT SERPL-CCNC: 20 U/L (ref 12–78)
ANION GAP SERPL CALC-SCNC: 7 MMOL/L (ref 5–15)
AST SERPL-CCNC: 21 U/L (ref 15–37)
ATRIAL RATE: 60 BPM
BILIRUB SERPL-MCNC: 1 MG/DL (ref 0.2–1)
BUN SERPL-MCNC: 10 MG/DL (ref 6–20)
BUN/CREAT SERPL: 20 (ref 12–20)
CALCIUM SERPL-MCNC: 7.7 MG/DL (ref 8.5–10.1)
CALCULATED P AXIS, ECG09: 73 DEGREES
CALCULATED R AXIS, ECG10: 63 DEGREES
CALCULATED T AXIS, ECG11: 74 DEGREES
CHLORIDE SERPL-SCNC: 104 MMOL/L (ref 97–108)
CHOLEST SERPL-MCNC: 183 MG/DL
CO2 SERPL-SCNC: 24 MMOL/L (ref 21–32)
CREAT SERPL-MCNC: 0.5 MG/DL (ref 0.55–1.02)
DIAGNOSIS, 93000: NORMAL
ERYTHROCYTE [DISTWIDTH] IN BLOOD BY AUTOMATED COUNT: 12.2 % (ref 11.5–14.5)
GLOBULIN SER CALC-MCNC: 3.1 G/DL (ref 2–4)
GLUCOSE SERPL-MCNC: 90 MG/DL (ref 65–100)
HCT VFR BLD AUTO: 35.8 % (ref 35–47)
HDLC SERPL-MCNC: 70 MG/DL
HDLC SERPL: 2.6 {RATIO} (ref 0–5)
HGB BLD-MCNC: 12.1 G/DL (ref 11.5–16)
LDLC SERPL CALC-MCNC: 98 MG/DL (ref 0–100)
LIPASE SERPL-CCNC: 516 U/L (ref 73–393)
MAGNESIUM SERPL-MCNC: 1.9 MG/DL (ref 1.6–2.4)
MCH RBC QN AUTO: 31.6 PG (ref 26–34)
MCHC RBC AUTO-ENTMCNC: 33.8 G/DL (ref 30–36.5)
MCV RBC AUTO: 93.5 FL (ref 80–99)
NRBC # BLD: 0 K/UL (ref 0–0.01)
NRBC BLD-RTO: 0 PER 100 WBC
P-R INTERVAL, ECG05: 132 MS
PHOSPHATE SERPL-MCNC: 2.8 MG/DL (ref 2.6–4.7)
PLATELET # BLD AUTO: 177 K/UL (ref 150–400)
PMV BLD AUTO: 8.8 FL (ref 8.9–12.9)
POTASSIUM SERPL-SCNC: 3.5 MMOL/L (ref 3.5–5.1)
PROT SERPL-MCNC: 5.9 G/DL (ref 6.4–8.2)
Q-T INTERVAL, ECG07: 458 MS
QRS DURATION, ECG06: 72 MS
QTC CALCULATION (BEZET), ECG08: 458 MS
RBC # BLD AUTO: 3.83 M/UL (ref 3.8–5.2)
SODIUM SERPL-SCNC: 135 MMOL/L (ref 136–145)
TRIGL SERPL-MCNC: 75 MG/DL (ref ?–150)
TSH SERPL DL<=0.05 MIU/L-ACNC: 3.51 UIU/ML (ref 0.36–3.74)
VENTRICULAR RATE, ECG03: 60 BPM
VLDLC SERPL CALC-MCNC: 15 MG/DL
WBC # BLD AUTO: 4.4 K/UL (ref 3.6–11)

## 2021-06-03 PROCEDURE — 85027 COMPLETE CBC AUTOMATED: CPT

## 2021-06-03 PROCEDURE — 83735 ASSAY OF MAGNESIUM: CPT

## 2021-06-03 PROCEDURE — 74011250637 HC RX REV CODE- 250/637: Performed by: FAMILY MEDICINE

## 2021-06-03 PROCEDURE — 36415 COLL VENOUS BLD VENIPUNCTURE: CPT

## 2021-06-03 PROCEDURE — 84100 ASSAY OF PHOSPHORUS: CPT

## 2021-06-03 PROCEDURE — 80061 LIPID PANEL: CPT

## 2021-06-03 PROCEDURE — 80053 COMPREHEN METABOLIC PANEL: CPT

## 2021-06-03 PROCEDURE — 74011250636 HC RX REV CODE- 250/636: Performed by: INTERNAL MEDICINE

## 2021-06-03 PROCEDURE — 83690 ASSAY OF LIPASE: CPT

## 2021-06-03 PROCEDURE — 94760 N-INVAS EAR/PLS OXIMETRY 1: CPT

## 2021-06-03 PROCEDURE — 84443 ASSAY THYROID STIM HORMONE: CPT

## 2021-06-03 RX ORDER — ONDANSETRON 4 MG/1
4 TABLET, ORALLY DISINTEGRATING ORAL
Qty: 20 TABLET | Refills: 0 | Status: SHIPPED | OUTPATIENT
Start: 2021-06-03

## 2021-06-03 RX ADMIN — LEVOTHYROXINE SODIUM 25 MCG: 0.05 TABLET ORAL at 06:08

## 2021-06-03 RX ADMIN — CYCLOSPORINE 1 DROP: 0.5 EMULSION OPHTHALMIC at 09:50

## 2021-06-03 RX ADMIN — ASPIRIN 81 MG: 81 TABLET, CHEWABLE ORAL at 09:49

## 2021-06-03 RX ADMIN — FLUOXETINE HYDROCHLORIDE 5 MG: 20 LIQUID ORAL at 09:49

## 2021-06-03 RX ADMIN — TIMOLOL MALEATE 1 DROP: 5 SOLUTION OPHTHALMIC at 09:49

## 2021-06-03 RX ADMIN — Medication 10 ML: at 04:23

## 2021-06-03 RX ADMIN — DORZOLAMIDE HYDROCHLORIDE 1 DROP: 20 SOLUTION/ DROPS OPHTHALMIC at 09:49

## 2021-06-03 RX ADMIN — SODIUM CHLORIDE, POTASSIUM CHLORIDE, SODIUM LACTATE AND CALCIUM CHLORIDE 100 ML/HR: 600; 310; 30; 20 INJECTION, SOLUTION INTRAVENOUS at 09:49

## 2021-06-03 NOTE — PROGRESS NOTES
Problem: Falls - Risk of  Goal: *Absence of Falls  Outcome: Progressing Towards Goal  Note: Fall Risk Interventions:  Mobility Interventions: Bed/chair exit alarm, Communicate number of staff needed for ambulation/transfer, Patient to call before getting OOB         Medication Interventions: Bed/chair exit alarm, Patient to call before getting OOB, Teach patient to arise slowly    Elimination Interventions: Bed/chair exit alarm, Call light in reach, Patient to call for help with toileting needs              Problem: Pancreatitis  Goal: *Control of acute pain  Outcome: Progressing Towards Goal  Goal: *Absence of nausea/vomiting  Outcome: Progressing Towards Goal  Goal: *Optimize nutritional status  Outcome: Progressing Towards Goal  Goal: *Labs within defined limits  Outcome: Progressing Towards Goal     Problem: Impaired Skin Integrity/Pressure Injury Treatment  Goal: *Improvement of Existing Pressure Injury  Outcome: Progressing Towards Goal  Goal: *Prevention of pressure injury  Outcome: Progressing Towards Goal  Note: Pressure Injury Interventions:                      Nutrition Interventions: Document food/fluid/supplement intake

## 2021-06-03 NOTE — PROGRESS NOTES
Received records from PCP at [facility] of [address] visit 5/14/2021 patient reported that she is taking her antidepressant as directed and feels that her depression is optimally managed. Patient reported not being on her lipid-lowering medicine in a while and was curious about her lipid levels. No recent TIAs but her balance is poor. Labs 5/14/2021 CBC unremarkable  direct HDL 90, TSH 3.44, free T4 1.36, hemoglobin A1c 6.1, CMP unremarkable.

## 2021-06-03 NOTE — DISCHARGE INSTRUCTIONS
HOSPITALIST DISCHARGE INSTRUCTIONS  NAME: Leonarda Powell   :  7/3/1928   MRN:  [id number]     Date/Time:  6/3/2021 11:55 AM    ADMIT DATE: 2021     DISCHARGE DATE: 6/3/2021     ADMITTING DIAGNOSIS:  Acute pancreatitis, possibly due to a viral enteritis    DISCHARGE DIAGNOSIS:  same    MEDICATIONS:  See after visit summary       · It is important that you take the medication exactly as they are prescribed. · Keep your medication in the bottles provided by the pharmacist and keep a list of the medication names, dosages, and times to be taken in your wallet. · Do not take other medications without consulting your doctor     Pain Management: per above medications    What to do at Home    Recommended diet:  Low fat, Low cholesterol    Recommended activity: Activity as tolerated    1) Return to the hospital if you feel worse    2) If you experience any of the following symptoms then please call your primary care physician or return to the emergency room if you cannot get hold of your doctor:  Fever, chills, nausea, vomiting, diarrhea, change in mentation, falling, bleeding, shortness of breath, chest pain, severe headache, severe abdominal pain,     3) Keep hydrated    4) Follow up with your doctors     Follow Up: Follow-up Information     Follow up With Specialties Details Why Contact Info    Vanessa Huertas MD Family Medicine Schedule an appointment as soon as possible for a visit in 5 days  [address] [place] [address]  [phone]              Information obtained by :  I understand that if any problems occur once I am at home I am to contact my physician. I understand and acknowledge receipt of the instructions indicated above.                                                                                                                                            Physician's or R.N.'s Signature                                                                  Date/Time Patient or Representative Signature                                                          Date/Time      Patient Education        Learning About Acute Pancreatitis  What is acute pancreatitis? The pancreas is an organ behind the stomach. It makes hormones like insulin that help control how your body uses sugar. It also makes enzymes that help you digest food. Usually these enzymes flow from the pancreas to the intestines. But if they leak into the pancreas, they can irritate it and cause pain and swelling. When this happens suddenly, it's called acute pancreatitis. What causes it? Most of the time, acute pancreatitis is caused by gallstones or by heavy alcohol use. But several other things can cause it, such as:  · High levels of fats in the blood. · An injury. · A problem after a surgery or a procedure. · Certain medicines. What are the symptoms? The main symptom is pain in the upper belly. The pain can be severe. You also may have a fever, nausea, or vomiting. Some people get so sick that they have problems breathing. They also may have low blood pressure. How is it diagnosed? Your doctor will diagnose pancreatitis with an exam and by looking at your lab tests. Your doctor may think that you have this problem based on your symptoms and where you have pain in your belly. You may have blood tests of enzymes called amylase and lipase. In pancreatitis, the level of these enzymes is usually much higher than normal.  You also may have imaging tests of the belly. These may include an ultrasound, a CT scan, or an MRI. A special MRI called MRCP can show images of the bile ducts. This test can be very helpful when gallstones are causing the problem. How is it treated?   Treatment of acute pancreatitis usually takes place in the hospital. It focuses on taking care of pain and supporting your body while your pancreas heals. In severe cases, treatment may occur in an intensive care unit to support breathing, treat serious infections, or help raise very low blood pressure. If a gallstone is causing the problem, the gallstone may need to be removed. This is done during a procedure called ERCP. The doctor puts a scope in your mouth and moves it gently through the stomach and into the ducts from the pancreas and gallbladder. The doctor is then able to see a stone and remove it. Sometimes the gallbladder, which makes gallstones, needs to be removed by surgery. People with pancreatitis often need a lot of fluid to help support their other organs and their blood pressure. They get fluids through a vein (intravenous, or IV). Instead of food by mouth, nutrition is sometimes given through a vein while the pancreas heals. Where can you learn more? Go to http://www.gray.com/  Enter U637 in the search box to learn more about \"Learning About Acute Pancreatitis. \"  Current as of: April 15, 2020               Content Version: 12.8  © 2006-2021 Healthwise, [facility]. Care instructions adapted under license by [employer] (which disclaims liability or warranty for this information). If you have questions about a medical condition or this instruction, always ask your healthcare professional. [address] any warranty or liability for your use of this information.

## 2021-06-03 NOTE — PROGRESS NOTES
6/3/2021  Case Management Discharge Note    12:06 PM  Patient is 80year old female admitted 6/2 for pancreatitis. Patient's RUR is 14% green/low risk of readmission  Chart reviewed--patient discussed in IDR rounds this morning. Patient is discharging today, has an order in already. Patient does not have any CM needs, she is going back home to [facility]. Family will transport her per initial assessment. No other needs at this time. Transition of Care Plan  1. Home today  2. No CM needs at this time  3. Family will transport  4. OK for discharge from CM standpoint    Care Management Interventions  PCP Verified by CM: Yes (within the last week)  Mode of Transport at Discharge: Other (see comment) (family will provide transport at [employer])  Transition of Care Consult (CM Consult): Discharge Planning  Discharge Durable Medical Equipment: No  Physical Therapy Consult: Yes  Occupational Therapy Consult: Yes  Speech Therapy Consult: No  Current Support Network:  Other (resides at [address] [place] [address])  Confirm Follow Up Transport: Family  Discharge Location  Discharge Placement: Home with family assistance    LIBRA Garnica

## 2021-06-03 NOTE — ROUTINE PROCESS
Bedside and Verbal shift change report given to [address] [place] [address] Susan (oncoming nurse) by Le Quinn (offgoing nurse). Report included the following information SBAR and Kardex.

## 2021-06-03 NOTE — DISCHARGE SUMMARY
Hermilo Stover [facility]  [facility], [address] [place] [address]  [phone]    Physician Discharge Summary     Patient ID:  Yvonne Viera  [id number]  80 y.o.  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 Unclear etiology, could be a viral process. Denied etoh. Abd CT unremarkable. TG normal.  Educated patient and daughter on pancreatitis, diet     2) Depression/dementia: cont prozac, aricept     3) Hypothyroid: cont synthroid     4) Hx of CVA/cerebral amyloid: has R sided weakness.   Cont ASA, statin    PCP: Hebert Michele MD     Consults: GI    Significant Diagnostic Studies: none    Discharge Exam:  Physical Exam:    Gen: Elderly, thin, frail, NAD  HEENT:  Pink conjunctivae, PERRL, hearing intact to voice, dry mucous membranes  Neck: Supple, without masses, thyroid non-tender  Resp: No accessory muscle use, clear breath sounds without wheezes rales or rhonchi  Card: No murmurs, normal S1, S2 without thrills, bruits or peripheral edema  Abd:  Soft, non-tender, non-distended, normoactive bowel sounds are present  Lymph:  No cervical or inguinal adenopathy  Musc: No cyanosis or clubbing  Skin: No rashes  Neuro:  Cranial nerves are grossly intact, generalized weakness, follows commands appropriately  Psych:  poor insight, oriented to person, place and time, alert    Disposition: home  Discharge Condition: Stable    Patient Instructions:   Current Discharge Medication List      START taking these medications    Details   ondansetron (Zofran ODT) 4 mg disintegrating tablet Take 1 Tablet by mouth every eight (8) hours as needed for Nausea or Vomiting. Qty: 20 Tablet, Refills: 0         CONTINUE these medications which have NOT CHANGED    Details   donepeziL (ARICEPT) 10 mg tablet Take 10 mg by mouth nightly. FLUoxetine (PROzac) 10 mg tablet TAKE 1/2 TABLET BY MOUTH EVERY MORNING WITH BREAKFAST      aspirin 81 mg chewable tablet Take 1 Tab by mouth daily. Qty: 30 Tab, Refills: 0      atorvastatin (LIPITOR) 40 mg tablet Take 1 Tab by mouth nightly. Qty: 30 Tab, Refills: 0      levothyroxine (SYNTHROID) 25 mcg tablet TAKE 1 TABLET IN THE MORNING BEFORE BREAKFAST  Qty: 60 Tab, Refills: 6      cycloSPORINE (RESTASIS) 0.05 % ophthalmic emulsion Administer 1 Drop to both eyes two (2) times a day. 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     Associated Diagnoses: Thyrotoxicosis without mention of goiter or other cause, without mention of thyrotoxic crisis or storm           Activity: Activity as tolerated  Diet: Low fat, Low cholesterol  Wound Care: None needed    Follow-up with  Follow-up Information     Follow up With Specialties Details Why Contact Info    Maurizio Cifuentes MD Family Medicine Schedule an appointment as soon as possible for a visit in 5 days  [address] [place] [address]  [phone]            Follow-up tests/labs none    Signed:  Hailee Quintanilla MD  6/3/2021  11:57 AM  **I personally spent 35 min on discharge**

## 2021-06-03 NOTE — PROGRESS NOTES
Problem: Falls - Risk of  Goal: *Absence of Falls  Description: Document Zana Richards Fall Risk and appropriate interventions in the flowsheet. Outcome: Resolved/Met     Problem: Patient Education: Go to Patient Education Activity  Goal: Patient/Family Education  Outcome: Resolved/Met     Problem: Pancreatitis  Goal: *Control of acute pain  Outcome: Resolved/Met  Goal: *Absence of nausea/vomiting  Outcome: Resolved/Met  Goal: *Optimize nutritional status  Outcome: Resolved/Met  Goal: *Labs within defined limits  Outcome: Resolved/Met     Problem: Patient Education: Go to Patient Education Activity  Goal: Patient/Family Education  Outcome: Resolved/Met     Problem: Impaired Skin Integrity/Pressure Injury Treatment  Goal: *Improvement of Existing Pressure Injury  Outcome: Resolved/Met  Goal: *Prevention of pressure injury  Description: Document Papito Scale and appropriate interventions in the flowsheet.   Outcome: Resolved/Met     Problem: Patient Education: Go to Patient Education Activity  Goal: Patient/Family Education  Outcome: Resolved/Met

## 2021-06-04 ENCOUNTER — HOSPITAL ENCOUNTER (INPATIENT)
Age: 86
LOS: 3 days | Discharge: NURSING, REHAB OR LONG-TERM CARE | DRG: 439 | End: 2021-06-07
Attending: EMERGENCY MEDICINE | Admitting: INTERNAL MEDICINE
Payer: MEDICARE

## 2021-06-04 ENCOUNTER — APPOINTMENT (OUTPATIENT)
Dept: GENERAL RADIOLOGY | Age: 86
DRG: 439 | End: 2021-06-04
Attending: EMERGENCY MEDICINE
Payer: MEDICARE

## 2021-06-04 DIAGNOSIS — K85.00 IDIOPATHIC ACUTE PANCREATITIS WITHOUT INFECTION OR NECROSIS: Primary | ICD-10-CM

## 2021-06-04 DIAGNOSIS — E87.1 HYPONATREMIA: ICD-10-CM

## 2021-06-04 LAB
ALBUMIN SERPL-MCNC: 3.4 G/DL (ref 3.5–5)
ALBUMIN/GLOB SERPL: 0.9 {RATIO} (ref 1.1–2.2)
ALP SERPL-CCNC: 64 U/L (ref 45–117)
ALT SERPL-CCNC: 23 U/L (ref 12–78)
AMYLASE SERPL-CCNC: 419 U/L (ref 25–115)
ANION GAP SERPL CALC-SCNC: 4 MMOL/L (ref 5–15)
AST SERPL-CCNC: 36 U/L (ref 15–37)
BASOPHILS # BLD: 0 K/UL (ref 0–0.1)
BASOPHILS NFR BLD: 1 % (ref 0–1)
BILIRUB SERPL-MCNC: 1.2 MG/DL (ref 0.2–1)
BUN SERPL-MCNC: 9 MG/DL (ref 6–20)
BUN/CREAT SERPL: 18 (ref 12–20)
CALCIUM SERPL-MCNC: 8.2 MG/DL (ref 8.5–10.1)
CHLORIDE SERPL-SCNC: 95 MMOL/L (ref 97–108)
CO2 SERPL-SCNC: 27 MMOL/L (ref 21–32)
COVID-19 RAPID TEST, COVR: NOT DETECTED
CREAT SERPL-MCNC: 0.51 MG/DL (ref 0.55–1.02)
DIFFERENTIAL METHOD BLD: ABNORMAL
EOSINOPHIL # BLD: 0.1 K/UL (ref 0–0.4)
EOSINOPHIL NFR BLD: 2 % (ref 0–7)
ERYTHROCYTE [DISTWIDTH] IN BLOOD BY AUTOMATED COUNT: 11.6 % (ref 11.5–14.5)
GLOBULIN SER CALC-MCNC: 3.7 G/DL (ref 2–4)
GLUCOSE SERPL-MCNC: 104 MG/DL (ref 65–100)
HCT VFR BLD AUTO: 36.4 % (ref 35–47)
HGB BLD-MCNC: 12.5 G/DL (ref 11.5–16)
IMM GRANULOCYTES # BLD AUTO: 0 K/UL (ref 0–0.04)
IMM GRANULOCYTES NFR BLD AUTO: 0 % (ref 0–0.5)
LIPASE SERPL-CCNC: >3000 U/L (ref 73–393)
LYMPHOCYTES # BLD: 0.9 K/UL (ref 0.8–3.5)
LYMPHOCYTES NFR BLD: 16 % (ref 12–49)
MCH RBC QN AUTO: 32 PG (ref 26–34)
MCHC RBC AUTO-ENTMCNC: 34.3 G/DL (ref 30–36.5)
MCV RBC AUTO: 93.1 FL (ref 80–99)
MONOCYTES # BLD: 0.6 K/UL (ref 0–1)
MONOCYTES NFR BLD: 11 % (ref 5–13)
NEUTS SEG # BLD: 3.9 K/UL (ref 1.8–8)
NEUTS SEG NFR BLD: 70 % (ref 32–75)
NRBC # BLD: 0 K/UL (ref 0–0.01)
NRBC BLD-RTO: 0 PER 100 WBC
PLATELET # BLD AUTO: 173 K/UL (ref 150–400)
PMV BLD AUTO: 8.5 FL (ref 8.9–12.9)
POTASSIUM SERPL-SCNC: 3.6 MMOL/L (ref 3.5–5.1)
PROT SERPL-MCNC: 7.1 G/DL (ref 6.4–8.2)
RBC # BLD AUTO: 3.91 M/UL (ref 3.8–5.2)
SARS-COV-2, COV2: NORMAL
SODIUM SERPL-SCNC: 126 MMOL/L (ref 136–145)
SOURCE, COVRS: NORMAL
WBC # BLD AUTO: 5.6 K/UL (ref 3.6–11)

## 2021-06-04 PROCEDURE — 87635 SARS-COV-2 COVID-19 AMP PRB: CPT

## 2021-06-04 PROCEDURE — 97535 SELF CARE MNGMENT TRAINING: CPT

## 2021-06-04 PROCEDURE — 99284 EMERGENCY DEPT VISIT MOD MDM: CPT

## 2021-06-04 PROCEDURE — 74011250636 HC RX REV CODE- 250/636: Performed by: INTERNAL MEDICINE

## 2021-06-04 PROCEDURE — 82150 ASSAY OF AMYLASE: CPT

## 2021-06-04 PROCEDURE — 96374 THER/PROPH/DIAG INJ IV PUSH: CPT

## 2021-06-04 PROCEDURE — 97161 PT EVAL LOW COMPLEX 20 MIN: CPT

## 2021-06-04 PROCEDURE — 85025 COMPLETE CBC W/AUTO DIFF WBC: CPT

## 2021-06-04 PROCEDURE — 96375 TX/PRO/DX INJ NEW DRUG ADDON: CPT

## 2021-06-04 PROCEDURE — 97166 OT EVAL MOD COMPLEX 45 MIN: CPT

## 2021-06-04 PROCEDURE — 80053 COMPREHEN METABOLIC PANEL: CPT

## 2021-06-04 PROCEDURE — 97116 GAIT TRAINING THERAPY: CPT

## 2021-06-04 PROCEDURE — 83690 ASSAY OF LIPASE: CPT

## 2021-06-04 PROCEDURE — 65270000029 HC RM PRIVATE

## 2021-06-04 PROCEDURE — 74018 RADEX ABDOMEN 1 VIEW: CPT

## 2021-06-04 PROCEDURE — 74011250636 HC RX REV CODE- 250/636: Performed by: EMERGENCY MEDICINE

## 2021-06-04 PROCEDURE — 96361 HYDRATE IV INFUSION ADD-ON: CPT

## 2021-06-04 PROCEDURE — 36415 COLL VENOUS BLD VENIPUNCTURE: CPT

## 2021-06-04 RX ORDER — KETOROLAC TROMETHAMINE 30 MG/ML
30 INJECTION, SOLUTION INTRAMUSCULAR; INTRAVENOUS
Status: DISCONTINUED | OUTPATIENT
Start: 2021-06-04 | End: 2021-06-04

## 2021-06-04 RX ORDER — KETOROLAC TROMETHAMINE 30 MG/ML
15 INJECTION, SOLUTION INTRAMUSCULAR; INTRAVENOUS EVERY 6 HOURS
Status: DISCONTINUED | OUTPATIENT
Start: 2021-06-04 | End: 2021-06-06

## 2021-06-04 RX ORDER — POLYETHYLENE GLYCOL 3350 17 G/17G
17 POWDER, FOR SOLUTION ORAL DAILY PRN
Status: DISCONTINUED | OUTPATIENT
Start: 2021-06-04 | End: 2021-06-07 | Stop reason: HOSPADM

## 2021-06-04 RX ORDER — HYDROMORPHONE HYDROCHLORIDE 1 MG/ML
0.2 INJECTION, SOLUTION INTRAMUSCULAR; INTRAVENOUS; SUBCUTANEOUS
Status: DISCONTINUED | OUTPATIENT
Start: 2021-06-04 | End: 2021-06-07 | Stop reason: HOSPADM

## 2021-06-04 RX ORDER — ONDANSETRON 2 MG/ML
8 INJECTION INTRAMUSCULAR; INTRAVENOUS
Status: COMPLETED | OUTPATIENT
Start: 2021-06-04 | End: 2021-06-04

## 2021-06-04 RX ORDER — KETOROLAC TROMETHAMINE 30 MG/ML
15 INJECTION, SOLUTION INTRAMUSCULAR; INTRAVENOUS
Status: COMPLETED | OUTPATIENT
Start: 2021-06-04 | End: 2021-06-04

## 2021-06-04 RX ORDER — ACETAMINOPHEN 325 MG/1
650 TABLET ORAL
Status: DISCONTINUED | OUTPATIENT
Start: 2021-06-04 | End: 2021-06-07 | Stop reason: HOSPADM

## 2021-06-04 RX ORDER — SODIUM CHLORIDE 0.9 % (FLUSH) 0.9 %
5-40 SYRINGE (ML) INJECTION AS NEEDED
Status: DISCONTINUED | OUTPATIENT
Start: 2021-06-04 | End: 2021-06-07 | Stop reason: HOSPADM

## 2021-06-04 RX ORDER — KETOROLAC TROMETHAMINE 30 MG/ML
15 INJECTION, SOLUTION INTRAMUSCULAR; INTRAVENOUS EVERY 6 HOURS
Status: DISCONTINUED | OUTPATIENT
Start: 2021-06-04 | End: 2021-06-04

## 2021-06-04 RX ORDER — PROMETHAZINE HYDROCHLORIDE 25 MG/1
12.5 TABLET ORAL
Status: DISCONTINUED | OUTPATIENT
Start: 2021-06-04 | End: 2021-06-07 | Stop reason: HOSPADM

## 2021-06-04 RX ORDER — SODIUM CHLORIDE, SODIUM LACTATE, POTASSIUM CHLORIDE, CALCIUM CHLORIDE 600; 310; 30; 20 MG/100ML; MG/100ML; MG/100ML; MG/100ML
150 INJECTION, SOLUTION INTRAVENOUS CONTINUOUS
Status: DISCONTINUED | OUTPATIENT
Start: 2021-06-04 | End: 2021-06-06

## 2021-06-04 RX ORDER — ENOXAPARIN SODIUM 100 MG/ML
30 INJECTION SUBCUTANEOUS DAILY
Status: DISCONTINUED | OUTPATIENT
Start: 2021-06-04 | End: 2021-06-07 | Stop reason: HOSPADM

## 2021-06-04 RX ORDER — ACETAMINOPHEN 650 MG/1
650 SUPPOSITORY RECTAL
Status: DISCONTINUED | OUTPATIENT
Start: 2021-06-04 | End: 2021-06-07 | Stop reason: HOSPADM

## 2021-06-04 RX ORDER — ONDANSETRON 2 MG/ML
4 INJECTION INTRAMUSCULAR; INTRAVENOUS
Status: DISCONTINUED | OUTPATIENT
Start: 2021-06-04 | End: 2021-06-07 | Stop reason: HOSPADM

## 2021-06-04 RX ORDER — TETRACAINE HYDROCHLORIDE 5 MG/ML
1 SOLUTION OPHTHALMIC
Status: DISCONTINUED | OUTPATIENT
Start: 2021-06-04 | End: 2021-06-04

## 2021-06-04 RX ORDER — SODIUM CHLORIDE 0.9 % (FLUSH) 0.9 %
5-40 SYRINGE (ML) INJECTION EVERY 8 HOURS
Status: DISCONTINUED | OUTPATIENT
Start: 2021-06-04 | End: 2021-06-07 | Stop reason: HOSPADM

## 2021-06-04 RX ADMIN — ONDANSETRON 8 MG: 2 INJECTION INTRAMUSCULAR; INTRAVENOUS at 05:46

## 2021-06-04 RX ADMIN — SODIUM CHLORIDE, POTASSIUM CHLORIDE, SODIUM LACTATE AND CALCIUM CHLORIDE 150 ML/HR: 600; 310; 30; 20 INJECTION, SOLUTION INTRAVENOUS at 16:02

## 2021-06-04 RX ADMIN — SODIUM CHLORIDE 1000 ML: 9 INJECTION, SOLUTION INTRAVENOUS at 05:51

## 2021-06-04 RX ADMIN — KETOROLAC TROMETHAMINE 15 MG: 30 INJECTION, SOLUTION INTRAMUSCULAR; INTRAVENOUS at 22:56

## 2021-06-04 RX ADMIN — KETOROLAC TROMETHAMINE 15 MG: 30 INJECTION, SOLUTION INTRAMUSCULAR; INTRAVENOUS at 17:32

## 2021-06-04 RX ADMIN — SODIUM CHLORIDE, POTASSIUM CHLORIDE, SODIUM LACTATE AND CALCIUM CHLORIDE 150 ML/HR: 600; 310; 30; 20 INJECTION, SOLUTION INTRAVENOUS at 09:06

## 2021-06-04 RX ADMIN — KETOROLAC TROMETHAMINE 15 MG: 30 INJECTION, SOLUTION INTRAMUSCULAR; INTRAVENOUS at 11:35

## 2021-06-04 RX ADMIN — Medication 10 ML: at 22:55

## 2021-06-04 RX ADMIN — SODIUM CHLORIDE, POTASSIUM CHLORIDE, SODIUM LACTATE AND CALCIUM CHLORIDE 150 ML/HR: 600; 310; 30; 20 INJECTION, SOLUTION INTRAVENOUS at 22:55

## 2021-06-04 RX ADMIN — ENOXAPARIN SODIUM 30 MG: 30 INJECTION SUBCUTANEOUS at 09:23

## 2021-06-04 RX ADMIN — KETOROLAC TROMETHAMINE 15 MG: 30 INJECTION, SOLUTION INTRAMUSCULAR at 05:46

## 2021-06-04 NOTE — PROGRESS NOTES
Per P&T policy, ketorolac dosing has been adjusted to 15 mg for weight less than 50 kg or age greater than 72years old. [facility]  Ketorolac Auto Substitution  August 2016       Background: In the past 12 months, [facility] pharmacists have recommended dose adjustments for ketorolac injection for 25 orders. The acceptance rate is 96% for these recommendations. Per the package insert, a dose adjustment is recommended  for patients weighing less than 50 kg or greater than 72years old. Also, the PI advises that ketorolac have an increased incidence of GI bleeding, ulceration, and perforation when used in geriatric patients. Recommendation:  Pharmacists will automatically adjust the dose of ketorolac in patient greater than 72years old or less than 50 kg of body weight as follows:    Ketorolac Patients ?  72years old or < 50 kg   Ordered:  Ketorolac 30-60 mg IV/IM q6h/q6h PRN   Revise Order and Dispense:   Ketorolac 15 mg IV/IM q6h/q6h PRN se:        Rationale:  Ensures that patient receives dosing as outlined in PI  Improves patient safety  Reduces interruption to provider

## 2021-06-04 NOTE — PROGRESS NOTES
Care Management Readmission Assessment        NAME:   Daryl Downey   :     7/3/1928   MRN:     [id number]             RUR Score/Risk Level:       RUR:  12%  Risk Level:  Low    Assessment: In person with patient and family member daughter    Reason for Readmission:  Ms. Fatemeh Bowling is a 80 y.o. female with history that includes CVA with aphasia & right sided weakness (3/19) & hypothyroid  who was seen in ER for: pancreatitis & hyponatremia    Patient Active Problem List   Diagnosis Code    Acquired hypothyroidism E03.9    Cerebral amyloid angiopathy ([facility] [address] E85.4, I68.0    Headache R51.9    Cervical stenosis of spine M48.02    Expressive aphasia R47.01    TIA (transient ischemic attack) G45.9    Acute focal neurological deficit R29.818    Acute pancreatitis K85.90       Has any pertinent information changed since previous Care Management Assessment? Living arrangements:  Pt lives at [address] [facility]   ADLs:  Pt reports that she is independent with ADLs. Pt states that she does not cook. She will either obtain meals from [address] [place] [address] or eat with her son Lissette Kurtz) who lives locally   DME:   Rolling walker (uses outside of the house only) and shower chair   Pharm[facility] [address]. Pt does not drive so her son  her medications. Pt states that she occasionally forgets if she took her meds. CM, Pt, and daughter discussed automatic pill dispenser to help. Pt and daughter are agreeable to more information.     Insurer:  Payor: [place] MED[place]ARE / Plan: [place] MEDICARE PART A & B / Product Type: Medicare /     PCP: Opal Hernandez MD   [facility] of Practice:  [facility]   Current patient: Yes   Approximate date of last visit:    Access to virtual PCP visits:  Yes    Is a Care Conference indicated:   No    Did you attend your follow up appointment(s):   No  If not, why not:  Discharged from [facility] yesterday    Resources/supports as identified by patient/family:  Pt has supportive family Top Challenges facing patient (as identified by patient/family and CM): Finances/Medication cost?  None identified  Transportation? None identified       Support system or lack thereof? None identified     Living arrangements? None identified         Self-care/ADLs/Cognition? None identified       Advance Directive:  DNR, has an advance directive. Copy on chart. Plan for utilizing home health:  Unknown at this time             Transition of Care Plan:      Unable to determine at this time. Awaiting clinical progress, and disposition recommendations    Additional information: Pt [facility] on 06/03/21. CM completed initial assessment with Pt and daughter. Pt states that she lives at home in [address] [place] [address]. Pt has no hx of HH or home O2. Pt has a rolling walker and shower chair. Pt states that she only uses rolling walker outside of her house and for appts. Pt denies usually problems with ambulation but does indicate that she has become weaker lately. Pt reports that she is independent with ADLs. Pt denies problems with ADLs. CM and Pt discussed possible need for automatic pill dispenser. Pt and daughter were agreeable to additional information. Pt indicates she hasn't been ambulating at baseline lately. CM discussed possible PT/OT evaluations and possible recommendations (SNF, HH, no needs) based off of recommendations. Pt would be agreeable to SNF or HH if necessary. IF Pt is not appropriate for SNF placement [facility] (med management), PT, and OT. Discharge plan unclear at this time. Will continue to follow for discharge needs.      _____________________________________  LION Blancas - Care Management  6/4/2021   8:55 AM    Readmission Assessment  Number of days since last admission?: 1-7 days  Previous disposition: Home Alone  Who is being interviewed?: Patient, Caregiver  What was the patient's/caregiver's perception as to why they think they needed to return back to the hospital?: Other (Comment) (stomach started bothering me again)  Did you visit your Primary Care Physician after you left the hospital, before you returned this time?: No  Why weren't you able to visit your PCP?: Other (Comment) (was only gone for 24 hours)  Did you see a specialist, such as Cardiac, Pulmonary, Orthopedic Physician, etc. after you left the hospital?: No  Who advised the patient to return to the hospital?: Self-referral  Does the patient report anything that got in the way of taking their medications?: No  In our efforts to provide the best possible care to you and others like you, can you think of anything that we could have done to help you after you left the hospital the first time, so that you might not have needed to return so soon?: Other (Comment) (No, just felt sick)      Care Management Interventions  PCP Verified by CM: Yes Zurdo Quintero MD)  Last Visit to PCP: 05/14/21  Mode of Transport at Discharge:  Other (see comment) (family)  Transition of Care Consult (CM Consult): Discharge Planning  MyChart Signup: No  Discharge Durable Medical Equipment: No  Physical Therapy Consult: Yes  Occupational Therapy Consult: Yes  Speech Therapy Consult: No  Current Support Network: Lives Alone, Own Home  Confirm Follow Up Transport: Family  Discharge Location  Discharge Placement: Home with outpatient services

## 2021-06-04 NOTE — PROGRESS NOTES
Bedside and Verbal shift change report given to [address] [place] [address] (oncoming nurse) by Irais Diaz RN (offgoing nurse). Report included the following information SBAR, Kardex, MAR, Accordion and Recent Results.

## 2021-06-04 NOTE — ED PROVIDER NOTES
This is a 19-year-old female with a past medical history significant for CVA, dementia, hypothyroidism, cerebral amyloid angiopathy, status post back surgery, right hip replacement, hysterectomy, T&A who presents to the ED by EMS for evaluation for abdominal discomfort accompanied by nausea and diaphoresis that began this morning. The patient was just discharged from the hospital yesterday after being admitted and evaluated for pancreatitis. The patient states she was feeling better before she left the hospital and at home however this morning, she began having some discomfort that have not subsided. The patient was prescribed Zofran that has not yet been filled. Past Medical History:   Diagnosis Date    Cerebral amyloid angiopathy (HCC)     CVA (cerebral vascular accident) ([facility] [address] 03/2019    aphasia and right sided weakness, s/p IV TPA with subsequent SAH/IPH, found to have change c/w CAA    Hypothyroid        Past Surgical History:   Procedure Laterality Date    HX BACK SURGERY      HX HIP REPLACEMENT      right    HX HYSTERECTOMY      HX TONSILLECTOMY           Family History:   Problem Relation Age of Onset    Dementia Mother         Dec 96yo, had memory loss for the last few years. Social History     Socioeconomic History    Marital status:      Spouse name: Not on file    Number of children: Not on file    Years of education: Not on file    Highest education level: Not on file   Occupational History    Not on file   Tobacco Use    Smoking status: Never Smoker    Smokeless tobacco: Never Used   Substance and Sexual Activity    Alcohol use:  Yes     Alcohol/week: 0.8 standard drinks     Types: 1 Glasses of wine per week     Comment: rarely    Drug use: No    Sexual activity: Not Currently   Other Topics Concern    Not on file   Social History Narrative    Not on file     Social Determinants of Health     Financial Resource Strain:     Difficulty of Paying Living Expenses:    Food Insecurity:     Worried About Running Out of Food in the Last Year:     [address] in the Last Year:    Transportation Needs:     Lack of Transportation (Medical):  Lack of Transportation (Non-Medical):    Physical Activity:     Days of Exercise per Week:     Minutes of Exercise per Session:    Stress:     Feeling of Stress :    Social Connections:     Frequency of Communication with Friends and Family:     Frequency of Social Gatherings with Friends and Family:     Attends [employer] Services:     Active Member of Clubs or Organizations:     Attends Club or Organization Meetings:     Marital Status:    Intimate Partner Violence:     Fear of Current or Ex-Partner:     Emotionally Abused:     Physically Abused:     Sexually Abused: ALLERGIES: Patient has no known allergies. Review of Systems   All other systems reviewed and are negative. Vitals:    06/04/21 0538 06/04/21 0545 06/04/21 0600 06/04/21 0615   BP: (!) 181/68 (!) 167/66 (!) 146/54 (!) 146/59   Pulse: 62      Resp: 16      Temp: 97.4 °F (36.3 °C)      SpO2: 98% 98% 97% 97%   Weight: 41.7 kg (92 lb)      Height: 5' 1\" (1.549 m)               Physical Exam  Vitals and nursing note reviewed. Exam conducted with a chaperone present. CONSTITUTIONAL: Frail elderly female; in no apparent distress  HEAD: Normocephalic; atraumatic  EYES: PERRL; EOM intact; conjunctiva and sclera are clear bilaterally. ENT: No rhinorrhea; normal pharynx with no tonsillar hypertrophy; mucous membranes pink/moist, no erythema, no exudate. NECK: Supple; non-tender; no cervical lymphadenopathy  CARD: Normal S1, S2; no murmurs, rubs, or gallops. Regular rate and rhythm. RESP: Normal respiratory effort; breath sounds clear and equal bilaterally; no wheezes, rhonchi, or rales.   ABD: Normal bowel sounds; non-distended; mild periumbilical tenderness without any rebound or guarding; no palpable organomegaly, no masses, no bruits. Back Exam: Normal inspection; no vertebral point tenderness, no CVA tenderness. Normal range of motion. EXT: Normal ROM in all four extremities; non-tender to palpation; no swelling or deformity; distal pulses are normal, no edema. SKIN: Warm; dry; no rash. NEURO:Alert and oriented x 3, coherent, ANKIT-XII grossly intact, sensory and motor are non-focal.      MDM  Number of Diagnoses or Management Options  Diagnosis management comments: Assessment: 59-year-old female who was just discharged from the hospital of after evaluation for pancreatitis who presents to the ED with epigastric abdominal pain accompanied by nausea. She has a fairly benign exam with stable vital signs and appears well. Differential diagnosis include acute exacerbation of pancreatitis, obstipation/bowel obstruction/gastritis    Plan: Lab/IV fluid/antiemetic and analgesia/ Monitor and Reevaluate. Amount and/or Complexity of Data Reviewed  Clinical lab tests: ordered and reviewed  Tests in the radiology section of CPT®: ordered and reviewed  Tests in the medicine section of CPT®: ordered and reviewed  Discussion of test results with the performing providers: yes  Decide to obtain previous medical records or to obtain history from someone other than the patient: yes  Obtain history from someone other than the patient: yes  Review and summarize past medical records: yes  Discuss the patient with other providers: yes  Independent visualization of images, tracings, or specimens: yes    Risk of Complications, Morbidity, and/or Mortality  Presenting problems: moderate  Diagnostic procedures: moderate  Management options: moderate    Patient Progress  Patient progress: stable         Procedures    ABDOMINAL XRAY INTERPRETATION (ED MD)  No free air. No evidence of obstruction. No air-fluid levels.    Melany Barrera MD 6:36 AM    PROGRESS NOTE:  Pt has been reexamined by Melany Barrera MD all available results have been reviewed with pt and any available family. Pt understands sx, dx, and tx in ED. Care plan has been outlined and questions have been answered. Pt and any available family understands and agrees to need for admission to hospital for further tx not available in ED. Pt is ready for admission. Written by Rosalia Hurt MD,  6:51 AM    CONSULT NOTE:  Virgilio Caputo MD spoke with Dr. Delilah Beck of the adult hospitalist team. Discussed patient's presentation, history, physical assessment, and available diagnostic results. He will evaluate, write orders and admit the patient to the hospital. 6:51 AM    Perfect Serve Consult for Admission  6:57 AM    ED Room Number: ER07/07  Patient Name and age:  Jaci Eckert 80 y.o.  female  Working Diagnosis:   1. Idiopathic acute pancreatitis without infection or necrosis    2. Hyponatremia        COVID-19 Suspicion:  yes  Sepsis present:  no  Reassessment needed: no  Code Status:  Full Code  Readmission: yes  Isolation Requirements:  no  Recommended Level of Care:  telemetry  Department:[facility] ED - [phone]  Other: The patient was admitted and discharged for pancreatitis yesterday  .

## 2021-06-04 NOTE — PROGRESS NOTES
Problem: Mobility Impaired (Adult and Pediatric)  Goal: *Acute Goals and Plan of Care (Insert Text)  6/4/2021 1632 by Akila Gutierrez, PT, DPT  Note:   PHYSICAL THERAPY EVALUATION  Patient: Jitendra Lewis (76 y.o. female)  Date: 6/4/2021  Primary Diagnosis: Acute pancreatitis [K85.90]        Precautions: Falls       ASSESSMENT  Based on the objective data described below, the patient presents with diffusely decreased strength, decreased standing balance, and decreased tolerance for activity following readmission today for abdominal pain and . Patient just discharged one day ago. Patient reports extreme fatigue and is received supine down in bed, angled. Patient agreed to get up briefly with PT. Patient far from her usual baseline which is complete independence and active round Hilda Settle using RW. Anticipate she may need HHPT or SNF at [address] [place] [address] pending progress. Current Level of Function Impacting Discharge (mobility/balance): Mod A bed mobility but Min A x 1 for transfers and short distance gait with RW    Functional Outcome Measure: The patient scored 35/100 on the Barthel Index outcome measure. Other factors to consider for discharge: Daughter in town to stay with her? Patient will benefit from skilled therapy intervention to address the above noted impairments. PLAN :  Recommendations and Planned Interventions: bed mobility training, transfer training, gait training, patient and family training/education, and therapeutic activities      Frequency/Duration: Patient will be followed by physical therapy:  5 times a week to address goals.     Recommendation for discharge: (in order for the patient to meet his/her long term goals)  Therapy up to 5 days/week in SNF setting or intensive home health therapy program    This discharge recommendation:  Has not yet been discussed the attending provider and/or case management    IF patient discharges home will need the following DME: none SUBJECTIVE:   Patient stated I am just so exhausted dear.     OBJECTIVE DATA SUMMARY:   HISTORY:    Past Medical History:   Diagnosis Date    Cerebral amyloid angiopathy ([facility] [address]     CVA (cerebral vascular accident) ([facility] [address] 03/2019    aphasia and right sided weakness, s/p IV TPA with subsequent SAH/IPH, found to have change c/w CAA    Hypothyroid      Past Surgical History:   Procedure Laterality Date    HX BACK SURGERY      HX HIP REPLACEMENT      right    HX HYSTERECTOMY      HX TONSILLECTOMY         Personal factors and/or comorbidities impacting plan of care: 80-79 yo in one month; high fall risk    Home Situation  Home Environment: Independent living (@ [facility])  # Steps to Enter: 0  One/Two Story Residence: One story  Living Alone: Yes  Support Systems: Friends \ neighbors  Patient Expects to be Discharged to[de-identified] Independent living facility  Current DME Used/Available at Home: Oral Quirk, straight, Shower chair, Walker, rolling  Tub or Shower Type: Shower    EXAMINATION/PRESENTATION/DECISION MAKING:   Critical Behavior:  Neurologic State: Alert  Orientation Level: Oriented X4  Cognition: Appropriate decision making, Appropriate for age attention/concentration, Appropriate safety awareness  Safety/Judgement: Awareness of environment  Hearing: Auditory  Auditory Impairment: Hearing aid(s)    Range Of Motion:   WFLs    Strength:     Decreased but functional throughout 4 extremities       Vision:   Acuity:  (R eye blind)  Corrective Lenses: Glasses  Functional Mobility:  Bed Mobility:  Rolling: Minimum assistance  Supine to Sit: Moderate assistance  Sit to Supine: Moderate assistance  Scooting: Minimum assistance  Transfers:  Sit to Stand: Minimum assistance  Stand to Sit: Minimum assistance  Stand Pivot Transfers: Minimum assistance       Balance:   Sitting: Impaired; Without support  Sitting - Static: Good (unsupported)  Sitting - Dynamic: Fair (occasional)  Standing: Impaired; With support  Standing - Static: Constant support; Fair  Standing - Dynamic : Constant support; Fair  Ambulation/Gait Training:  Distance (ft): 45 Feet (ft)  Assistive Device: Gait belt;Walker, rolling  Ambulation - Level of Assistance: Minimal assistance  Gait Abnormalities: Decreased step clearance  Base of Support: Narrowed  Speed/Latia: Slow  Stairs - Level of Assistance:  (NT)    Functional Measure:  Barthel Index:    Bathin  Bladder: 5  Bowels: 10  Groomin  Dressin  Feedin  Mobility: 5  Stairs: 0  Toilet Use: 0  Transfer (Bed to Chair and Back): 5  Total: 35/100       The Barthel ADL Index: Guidelines  1. The index should be used as a record of what a patient does, not as a record of what a patient could do. 2. The main aim is to establish degree of independence from any help, physical or verbal, however minor and for whatever reason. 3. The need for supervision renders the patient not independent. 4. A patient's performance should be established using the best available evidence. Asking the patient, friends/relatives and nurses are the usual sources, but direct observation and common sense are also important. However direct testing is not needed. 5. Usually the patient's performance over the preceding 24-48 hours is important, but occasionally longer periods will be relevant. 6. Middle categories imply that the patient supplies over 50 per cent of the effort. 7. Use of aids to be independent is allowed. Leigh Ann Costa., Barthel, DJeniferW. (8600). Functional evaluation: the Barthel Index. [address] [facility] (14)2. GREGORIO Sherman, Pilar Breed.Mary Ann, [place], [address] [place] [address] (). Measuring the change indisability after inpatient rehabilitation; comparison of the responsiveness of the Barthel Index and Functional Girard Measure. Journal of Neurology, Neurosurgery, and Psychiatry, 66(4), 011-672.   Amanda Amador, N.J.A, GRACE Ballard, & Brad Bledsoe M.A. (2004.) Assessment of post-stroke quality of life in cost-effectiveness studies: The usefulness of the Barthel Index and the EuroQoL-5D. Quality of Life Research, 15, 854-16           Physical Therapy Evaluation Charge Determination   History Examination Presentation Decision-Making   MEDIUM  Complexity : 1-2 comorbidities / personal factors will impact the outcome/ POC  MEDIUM Complexity : 3 Standardized tests and measures addressing body structure, function, activity limitation and / or participation in recreation  LOW Complexity : Stable, uncomplicated  Other outcome measures Barthel Index  HIGH       Based on the above components, the patient evaluation is determined to be of the following complexity level: LOW     Pain Rating:      Activity Tolerance:   Poor, SpO2 stable on RA, and requires rest breaks    After treatment patient left in no apparent distress:   Supine in bed, Call bell within reach, Bed / chair alarm activated, and Side rails x 3    COMMUNICATION/EDUCATION:   The patients plan of care was discussed with: Registered nurse. Fall prevention education was provided and the patient/caregiver indicated understanding. and Patient/family have participated as able in goal setting and plan of care. Thank you for this referral.  Jori Morillo, PT, DPT   Time Calculation: 18 mins       6/4/2021 1629 by Skylar Denney PT, DPT  Note: FUNCTIONAL STATUS PRIOR TO ADMISSION: Patient was modified independent using a rolling walker for functional mobility. HOME SUPPORT PRIOR TO ADMISSION: The patient lived alone in independent living at [facility] to provide assistance. Physical Therapy Goals  Initiated 6/4/2021  1. Patient will move from supine to sit and sit to supine , scoot up and down, and roll side to side in bed with modified independence within 7 day(s). 2.  Patient will transfer from bed to chair and chair to bed with modified independence using the least restrictive device within 7 day(s).   3.  Patient will perform sit to stand with modified independence within 7 day(s). 4.  Patient will ambulate with modified independence for 150 feet with the least restrictive device within 7 day(s).

## 2021-06-04 NOTE — PROGRESS NOTES
[facility]: MED RECONCILIATION    Comments/Recommendations:   Patient discharged from [facility] yesterday 6/3/21, medication reconciliation completed using discharge AVS.  The only new medication prescribed at discharge was ondansetron PRN. Medications added:     none    Medications removed:    none    Medications adjusted:    none    Information obtained from: discharge summary [facility] 6/3/21    Allergies: Patient has no known allergies. Prior to Admission Medications:     Medication Documentation Review Audit       Reviewed by Ling Simon (Pharmacist) on 06/04/21 at 0743      Medication Sig Documenting Provider Last Dose Status Taking?   aspirin 81 mg chewable tablet Take 1 Tab by mouth daily. Reina Best MD  Active Yes   atorvastatin (LIPITOR) 40 mg tablet Take 1 Tab by mouth nightly. Do, Reina Avila MD  Active Yes   brimonidine-timolol (COMBIGAN) 0.2-0.5 % Drop ophthalmic solution Administer 1 Drop to right eye every twelve (12) hours. Provider, Historical  Active Yes   brinzolamide (AZOPT) 1 % ophthalmic suspension Administer 1 Drop to right eye two (2) times a day. Provider, Historical  Active Yes   cycloSPORINE (RESTASIS) 0.05 % ophthalmic emulsion Administer 1 Drop to both eyes two (2) times a day. Provider, Historical  Active Yes   donepeziL (ARICEPT) 10 mg tablet Take 10 mg by mouth nightly. Provider, Historical  Active Yes   FLUoxetine (PROzac) 10 mg tablet TAKE 1/2 TABLET BY MOUTH EVERY MORNING WITH BREAKFAST Provider, Historical  Active Yes   levothyroxine (SYNTHROID) 25 mcg tablet TAKE 1 TABLET IN THE MORNING BEFORE BREAKFAST Mile Castañeda MD  Active Yes   ondansetron (Zofran ODT) 4 mg disintegrating tablet Take 1 Tablet by mouth every eight (8) hours as needed for Nausea or Vomiting.  Reina Best MD  Active Yes                    Thank you,   Ling Mazariegos, PharmD, BCPS   Contact: [phone]

## 2021-06-04 NOTE — ED TRIAGE NOTES
Pt presents to ED via EMS from [address] [place] [address] with c/o \"abdominal discomfort from the nausea\". Pt denies vomiting or any pain at this time. Pt was seen in the ED yesterday, dx with pancreatitis.

## 2021-06-04 NOTE — PROGRESS NOTES
Pharmacy Dosing Services: 6/4/21    Consult for Enoxaparin by Dr. Valentin Hines made for this 80 y.o. female, for prophylaxis of  VTE. Wt Readings from Last 1 Encounters:   06/04/21 41.7 kg (92 lb)       Ht Readings from Last 1 Encounters:   06/04/21 154.9 cm (61\")             Previous Dose   Enoxaparin 40mg daily    Creatinine Clearance Estimated Creatinine Clearance: 33.8 mL/min (A) (by C-G formula based on SCr of 0.51 mg/dL (L)). Creatinine Lab Results   Component Value Date/Time    Creatinine 0.51 (L) 06/04/2021 05:50 AM         Platelet Lab Results   Component Value Date/Time    PLATELET 497 82/31/5589 06:18 AM        H/H Lab Results   Component Value Date/Time    HGB 12.5 06/04/2021 06:18 AM            Pharmacist made change to enoxaparin therapy based on:  [  Renal function: dose changed to:  [ ] weight: dose changed to: enoxaparin 30mg daily for female weight 35-45kg per protocol     Pharmacy to automatically make dose adjustment for renal dysfunction (creatinine clearance less than 30 mL/min)  Pharmacy to automatically make dose adjustment for obesity (BMI greater than 40)  Pharmacy to make dose rounding adjustments per [facility] dose adjustment scale. Pharmacy to monitor patients progress. Will make dose adjustment as needed per changing renal function. Will communicate further recommendations regarding patients anticoagulation therapy with prescriber. Signed Ling Mazariegos .  Contact information: [phone]

## 2021-06-04 NOTE — PROGRESS NOTES
Comprehensive Nutrition Assessment    Type and Reason for Visit: Initial, Positive nutrition screen    Nutrition Recommendations/Plan:   1. Advance diet as medically feasible. 2. Start vanilla Ensure HP BID once diet advanced. 3. Please obtain new measured weight. Nutrition Assessment:      6/4: 81 y/o female admitted with acute pancreatitis. PMH includes hx of CVA. Pt reports decreased po intake since last Thursday. Says it feels like a tight waistband is around her stomach and she can only take a bite/sip at a time. Says she is never a big eater anyway, but she was eating normally before last Thursday. Reports weighing 100 lbs 2.5 years ago before moving into [place], then lost 10 lbs and has been trying to gain it back since. She is unsure of what her weight has been recently or what it is now. Says she drinks Boost at home a few times per week. She is agreeable to receiving Ensure while here once diet advanced. Labs- Na 126, lipase >3000. [email or web address]- [email or web address]. Weight hx:   Wt Readings from Last 10 Encounters:   06/04/21 41.7 kg (92 lb)   06/02/21 41.7 kg (92 lb)   05/25/21 44.1 kg (97 lb 3.2 oz)   02/22/21 44.5 kg (98 lb 3.2 oz)   02/05/21 43.1 kg (95 lb)   06/29/20 47.1 kg (103 lb 12.8 oz)   07/11/19 48.2 kg (106 lb 4.8 oz)   06/02/19 47.2 kg (104 lb)   04/22/19 47.2 kg (104 lb)   03/21/19 48.6 kg (107 lb 2.3 oz)     Malnutrition Assessment:  Malnutrition Status:  Severe malnutrition    Context:  Acute illness     Findings of the 6 clinical characteristics of malnutrition:   Energy Intake:  7 - 50% or less of est energy requirements for 5 or more days  Weight Loss:  Unable to assess (pt doesn't know how much weight she has lost)     Body Fat Loss:  7 - Moderate body fat loss, Fat overlying ribs, Triceps   Muscle Mass Loss:  7 - Moderate muscle mass loss, Temples (temporalis), Hand (interosseous)  Fluid Accumulation:  No significant fluid accumulation,     Strength:  Not performed Estimated Daily Nutrient Needs:  Energy (kcal): 1243 (764 x 1.3 AF (+250)); Weight Used for Energy Requirements: Current  Protein (g): 57 (1-1.2 g/kg IBW); Weight Used for Protein Requirements: Ideal  Fluid (ml/day): 7398; Method Used for Fluid Requirements: 1 ml/kcal      Nutrition Related Findings:  last BM 6/4      Wounds:    None       Current Nutrition Therapies:  DIET NPO  ADULT ORAL NUTRITION SUPPLEMENT Breakfast, Dinner;  Low Calorie/High Protein    Anthropometric Measures:  · Height:  5' 1\" (154.9 cm)  · Current Body Wt:  41.7 kg (92 lb)   · Admission Body Wt:       · Usual Body Wt:        · Ideal Body Wt:  105 lbs:  87.6 %   · Adjusted Body Weight:   ; Weight Adjustment for: No adjustment   · Adjusted BMI:       · BMI Category:  Underweight (BMI less than 22) age over 72       Nutrition Diagnosis:   · Severe malnutrition related to inadequate protein-energy intake as evidenced by moderate muscle loss, moderate loss of subcutaneous fat, poor intake prior to admission, NPO or clear liquid status due to medical condition      Nutrition Interventions:   Food and/or Nutrient Delivery: Start oral diet, Start oral nutrition supplement  Nutrition Education and Counseling: No recommendations at this time  Coordination of Nutrition Care: Continue to monitor while inpatient    Goals:  Diet advancement with po intake >50% meals/supplements next 1-3 days       Nutrition Monitoring and Evaluation:   Behavioral-Environmental Outcomes: None identified  Food/Nutrient Intake Outcomes: Food and nutrient intake, Supplement intake, Diet advancement/tolerance  Physical Signs/Symptoms Outcomes: Weight, Biochemical data, GI status    Discharge Planning:    Continue oral nutrition supplement     Electronically signed by Ronda Diego RDN on 6/4/2021 at 1:58 PM    Contact: [phone]

## 2021-06-04 NOTE — ED NOTES
TRANSFER - OUT REPORT:    Verbal report given to Brenda(name) on Terrial Saige  being transferred to 5th floor(unit) for routine progression of care       Report consisted of patients Situation, Background, Assessment and   Recommendations(SBAR). Information from the following report(s) SBAR, ED Summary, [facility] and Recent Results was reviewed with the receiving nurse. Lines:   Peripheral IV 06/04/21 Anterior; Left Forearm (Active)        Opportunity for questions and clarification was provided.       Patient transported with:   [employer]

## 2021-06-04 NOTE — PROGRESS NOTES
Problem: Self Care Deficits Care Plan (Adult)  Goal: *Therapy Goal (Edit Goal, Insert Text)  Description:   FUNCTIONAL STATUS PRIOR TO ADMISSION: Patient was modified independent with all basic self care tasks, and using a rolling walker for functional mobility outside of the home. Patient lives in [address] [place] [address] at [facility]. She reports that she does not cook, and per chart son assists with medication. She states she walks daily, and enjoys the facility's exercise classes    HOME SUPPORT: The patient lived alone with son, who lives nearby, to provide assistance. Occupational Therapy Goals  Initiated 6/4/2021  1. Patient will perform grooming in stand with supervision/set-up within 7 day(s). 2.  Patient will perform bathing with supervision/set-up within 7 day(s). 3.  Patient will perform lower body dressing with minimal assistance/contact guard assist within 7 day(s). 4.  Patient will perform toilet transfers with supervision/set-up using RW within 7 day(s). 5.  Patient will perform all aspects of toileting with supervision/set-up within 7 day(s). Outcome: Not Met   OCCUPATIONAL THERAPY EVALUATION  Patient: Alirio Rudd (63 y.o. female)  Date: 6/4/2021  Primary Diagnosis: Acute pancreatitis [K85.90]        Precautions: fall       ASSESSMENT  Based on the objective data described below, the patient presents with moderately severe decline in ADL task and transfer performance from baseline. Patient is limited by general weakness/fatigue, impaired sitting and standing balance (with posterior LOB in both positions), poor standing tolerance. Patient is oriented and has good insight into current situation, though cognitive processing during activity was notably slowed. Patient has been receiving increased assistance from daughter who is temporarily staying with patient.  Patient will benefit from continued OT services at discharge to facilitate return to PLOF (Mod I BADL, Mod I ambulation- with and without use of RW). Current Level of Function Impacting Discharge (ADLs/self-care): Min A Ub ADL, Mod to Max A for LB bathing and dressing, Mod A toileting. Min A for toilet transfer    Functional Outcome Measure: The patient scored Total: 35/100 on the Barthel Index outcome measure which is indicative of 65% impaired ability to care for basic self needs/dependency on others; inferred 100% dependency on others for instrumental ADLs. Other factors to consider for discharge: patient lives alone      Patient will benefit from skilled therapy intervention to address the above noted impairments. PLAN :  Recommendations and Planned Interventions: self care training, functional mobility training, therapeutic exercise, balance training, therapeutic activities, endurance activities, patient education, home safety training, and family training/education    Frequency/Duration: Patient will be followed by occupational therapy 5 times a week to address goals.     Recommendation for discharge: (in order for the patient to meet his/her long term goals)  To be determined: SNF vs intense home health pending progress and availability for 24 hr assistance at discharge    This discharge recommendation:  Has been made in collaboration with the attending provider and/or case management    IF patient discharges home will need the following DME: patient owns DME required for discharge       SUBJECTIVE:   Patient pleasant and cooperative     OBJECTIVE DATA SUMMARY:   HISTORY:   Past Medical History:   Diagnosis Date    Cerebral amyloid angiopathy ([address]     CVA (cerebral vascular accident) ([facility] 03/2019    aphasia and right sided weakness, s/p IV TPA with subsequent SAH/IPH, found to have change c/w CAA    Hypothyroid      Past Surgical History:   Procedure Laterality Date    HX BACK SURGERY      HX HIP REPLACEMENT      right    HX HYSTERECTOMY      HX TONSILLECTOMY         Expanded or extensive additional review of patient history:     Home Situation  Home Environment: Independent living  One/Two Story Residence: One story  Living Alone: Yes  Support Systems: Friends \ neighbors, Family member(s), Home care staff  Patient Expects to be Discharged to[de-identified] Independent living facility  Current DME Used/Available at Home: [employer], straight, Shower chair    Hand dominance: Right    EXAMINATION OF PERFORMANCE DEFICITS:  Cognitive/Behavioral Status:  Neurologic State: Alert  Orientation Level: Oriented X4  Cognition: Appropriate decision making             Skin: intact    Edema: none    Hearing: Auditory  Auditory Impairment: Hearing aid(s)    Vision/Perceptual:                           Acuity:  (R eye blind)    Corrective Lenses: Glasses    Range of Motion:  WFL                            Strength:  WFL                   Coordination:     Fine Motor Skills-Upper: Left Intact; Right Intact    Gross Motor Skills-Upper: Left Intact; Right Intact    Tone & Sensation:  WFL                            Balance:  Sitting: Impaired; Without support  Sitting - Static: Good (unsupported)  Sitting - Dynamic: Fair (occasional)  Standing: Impaired; With support  Standing - Static: Constant support; Fair  Standing - Dynamic : Constant support; Fair    Functional Mobility and Transfers for ADLs:  Bed Mobility:  Supine to Sit: Minimum assistance  Scooting: Supervision    Transfers:  Sit to Stand: Minimum assistance  Stand to Sit: Minimum assistance  Toilet Transfer : Minimum assistance    ADL Assessment:       Oral Facial Hygiene/Grooming: Supervision;Setup    Bathing: Moderate assistance    Upper Body Dressing: Moderate assistance    Lower Body Dressing: Maximum assistance    Toileting: Moderate assistance                ADL Intervention and task modifications: Instructed in dressing tasks, toileting, balance in stand impaired- increased assistance required for all tasks.  Impaired sitting balance noted with occasional posterior LOB         Functional Measure:  Barthel Index:    Bathin  Bladder: 5  Bowels: 10  Groomin  Dressin  Feedin  Mobility: 5  Stairs: 0  Toilet Use: 0  Transfer (Bed to Chair and Back): 5  Total: 35/100        The Barthel ADL Index: Guidelines  1. The index should be used as a record of what a patient does, not as a record of what a patient could do. 2. The main aim is to establish degree of independence from any help, physical or verbal, however minor and for whatever reason. 3. The need for supervision renders the patient not independent. 4. A patient's performance should be established using the best available evidence. Asking the patient, friends/relatives and nurses are the usual sources, but direct observation and common sense are also important. However direct testing is not needed. 5. Usually the patient's performance over the preceding 24-48 hours is important, but occasionally longer periods will be relevant. 6. Middle categories imply that the patient supplies over 50 per cent of the effort. 7. Use of aids to be independent is allowed. Dash Soler., Barthel, D.W. (1695). Functional evaluation: the Barthel Index. [facility] (14)2. GREGORIO Benton, Kimberlyn Boudreaux., Mallorie Veronica.[facility], [address] [place] [address] (). Measuring the change indisability after inpatient rehabilitation; comparison of the responsiveness of the Barthel Index and Functional Meigs Measure. Journal of Neurology, Neurosurgery, and Psychiatry, 66(4), 632-877. Mary Rollins, N.J.A, GRACE Ballard, & Wilmer Malave M.A. (2004.) Assessment of post-stroke quality of life in cost-effectiveness studies: The usefulness of the Barthel Index and the EuroQoL-5D.  Quality of Life Research, 15, 264-30         Occupational Therapy Evaluation Charge Determination   History Examination Decision-Making   MEDIUM Complexity : Expanded review of history including physical, cognitive and psychosocial  history  MEDIUM Complexity : 3-5 performance deficits relating to physical, cognitive , or psychosocial skils that result in activity limitations and / or participation restrictions MEDIUM Complexity : Patient may present with comorbidities that affect occupational performnce. Miniml to moderate modification of tasks or assistance (eg, physical or verbal ) with assesment(s) is necessary to enable patient to complete evaluation       Based on the above components, the patient evaluation is determined to be of the following complexity level: MEDIUM  Pain Rating:  none    Activity Tolerance:   Fair and requires rest breaks    After treatment patient left in no apparent distress:    Sitting in chair, Call bell within reach, and Bed / chair alarm activated    COMMUNICATION/EDUCATION:   The patients plan of care was discussed with: Registered nurse. Home safety education was provided and the patient/caregiver indicated understanding. and Patient/family have participated as able in goal setting and plan of care. This patients plan of care is appropriate for delegation to [facility].     Thank you for this referral.  Hazel Louis OT  Time Calculation: 39 mins

## 2021-06-04 NOTE — H&P
Hospitalist Admission Note    NAME: Karly Gonzalez   :  7/3/1928   MRN:  [id number]     Date/Time:  2021 7:16 AM    Patient PCP: Maria De Jesus Ruiz MD  ________________________________________________________________________    Given the patient's current clinical presentation, I have a high level of concern for decompensation if discharged from the emergency department. Complex decision making was performed, which includes reviewing the patient's available past medical records, laboratory results, and x-ray films. My assessment of this patient's clinical condition and my plan of care is as follows. Assessment / Plan:    #Acute pancreatitis: No imaging or LFT abnls, TG normal. No meds contributing.    - IVF   - Ketorolac 15mg q6h   - Hold on MRCP for now    #Hyponatremia: Mild. Appears volume down at present   - IVF, trend; if not improving may need to consider malignancy work up    #Dementia: Aricept    #Depression: Fluoxetine    #Hx CVA: R sided weakness; asa, statin    #Hypothyroidism: LT4       I have personally reviewed the radiographs, laboratory data in Epic and decisions and statements above are based partially on this personal interpretation. Code Status: DNR/DNI  DVT Prophylaxis: Lovenox  GI Prophylaxis: not indicated       Subjective:   CHIEF COMPLAINT: \"*abd pain\"    HISTORY OF PRESENT ILLNESS:     Mohinder Glasgow is a 80 y.o.  F with hx hypothyroidism, cva, who was admitted for acute pancreatitis -3 presents today, , with abdominal pain, nausea, and vomiting. She says that since leaving the hospital she may have advanced her diet too quickly. She feels discomfort and pain in a band like sensation across her underwear line. In ER, lipase is > 3000, and was ~500 the day prior at discharge.         Past Medical History:   Diagnosis Date    Cerebral [facility] angiopathy ([facility] [address] [facility]     CVA (cerebral vascular accident) ([facility] [address] [facility] 2019    aphasia and right sided weakness, s/p IV TPA with subsequent SAH/IPH, found to have change c/w CAA    Hypothyroid       Past Surgical History:   Procedure Laterality Date    HX BACK SURGERY      HX HIP REPLACEMENT      right    HX HYSTERECTOMY      HX TONSILLECTOMY       Social History     Tobacco Use    Smoking status: Never Smoker    Smokeless tobacco: Never Used   Substance Use Topics    Alcohol use: Yes     Alcohol/week: 0.8 standard drinks     Types: 1 Glasses of wine per week     Comment: rarely      Family History   Problem Relation Age of Onset    Dementia Mother         Dec 94yo, had memory loss for the last few years. No Known Allergies     Prior to Admission medications    Medication Sig Start Date End Date Taking? Authorizing Provider   ondansetron (Zofran ODT) 4 mg disintegrating tablet Take 1 Tablet by mouth every eight (8) hours as needed for Nausea or Vomiting. 6/3/21   Bianka Best MD   donepeziL (ARICEPT) 10 mg tablet Take 10 mg by mouth nightly. Provider, Historical   FLUoxetine (PROzac) 10 mg tablet TAKE 1/2 TABLET BY MOUTH EVERY MORNING WITH BREAKFAST 4/29/21   Provider, Historical   aspirin 81 mg chewable tablet Take 1 Tab by mouth daily. 2/6/21   Eddie Best MD   atorvastatin (LIPITOR) 40 mg tablet Take 1 Tab by mouth nightly. 2/5/21   Bianka Best MD   levothyroxine (SYNTHROID) 25 mcg tablet TAKE 1 TABLET IN THE MORNING BEFORE BREAKFAST 5/14/14   Maribel Laura MD   cycloSPORINE (RESTASIS) 0.05 % ophthalmic emulsion Administer 1 Drop to both eyes two (2) times a day. Provider, Historical   brinzolamide (AZOPT) 1 % ophthalmic suspension Administer 1 Drop to right eye two (2) times a day. Provider, Historical   brimonidine-timolol (COMBIGAN) 0.2-0.5 % Drop ophthalmic solution Administer 1 Drop to right eye every twelve (12) hours.     Provider, Historical     REVIEW OF SYSTEMS:  See HPI for details  General: negative for fever, chills, sweats, weakness, weight loss  Eyes: negative for blurred vision, eye pain, loss of vision, diplopia  Ear Nose and Throat: negative for rhinorrhea, pharyngitis, otalgia, tinnitus, speech or swallowing difficulties  Respiratory:  negative for pleuritic pain, cough, sputum production, wheezing, SOB, SOLANO  Cardiology:  negative for chest pain, palpitations, orthopnea, PND, edema, syncope   Gastrointestinal: +  for abdominal pain, N/V, dysphagia, - change in bowel habits, bleeding  Genitourinary: negative for frequency, urgency, dysuria, hematuria, incontinence  Muskuloskeletal : negative for arthralgia, myalgia  Hematology: negative for easy bruising, bleeding, lymphadenopathy  Dermatological: negative for rash, ulceration, mole change, new lesion  Endocrine: negative for hot flashes or polydipsia  Neurological: negative for headache, dizziness, confusion, focal weakness, paresthesia, memory loss, gait disturbance  Psychological: negative for anxiety, depression, agitation      Objective:   VITALS:    Visit Vitals  BP (!) 146/59   Pulse 62   Temp 97.4 °F (36.3 °C)   Resp 16   Ht 5' 1\" (1.549 m)   Wt 41.7 kg (92 lb)   SpO2 97%   BMI 17.38 kg/m²     PHYSICAL EXAM:    Physical Exam:    Gen: Chronically ill appearing, frail; nad  HEENT:  Pink conjunctivae, PERRL, hearing intact to voice, moist mucous membranes  Neck: Supple, without masses, thyroid non-tender  Resp: No accessory muscle use, clear breath sounds without wheezes rales or rhonchi  Card: No murmurs, normal S1, S2 without thrills, bruits or peripheral edema  Abd:  Soft, mildly-tender, non-distended, normoactive bowel sounds are present, no palpable organomegaly and no detectable hernias  Lymph:  No cervical or inguinal adenopathy  Musc: No cyanosis or clubbing  Skin: No rashes or ulcers, skin turgor is good  Neuro:  Cranial nerves are grossly intact, no focal motor weakness, follows commands appropriately  Psych:  Good insight, oriented to person, place and time, alert          _______________________________________________________________________  Care Plan discussed with:    Comments   Patient x Discussed with patient in room. POC outlined and Questions answered    Family  x    RN x    Care Manager                    Consultant:  almita SKINNER MD   _______________________________________________________________________  Recommended Disposition:   Home with Family x   HH/PT/OT/RN    SNF/LTC    ALEJANDRINA    ________________________________________________________________________  TOTAL TIME:  45 Minutes        Comments   >50% of visit spent in counseling and coordination of care  Chart reviewed  Discussion with patient and/or family and questions answered     ________________________________________________________________________  Signed: Lambert Gordon MD    This note will not be viewable in [address] Procedures: see electronic medical records for all procedures/Xrays and details which were not copied into this note but were reviewed prior to creation of Plan. LAB DATA REVIEWED:    Recent Results (from the past 24 hour(s))   METABOLIC PANEL, COMPREHENSIVE    Collection Time: 06/04/21  5:50 AM   Result Value Ref Range    Sodium 126 (L) 136 - 145 mmol/L    Potassium 3.6 3.5 - 5.1 mmol/L    Chloride 95 (L) 97 - 108 mmol/L    CO2 27 21 - 32 mmol/L    Anion gap 4 (L) 5 - 15 mmol/L    Glucose 104 (H) 65 - 100 mg/dL    BUN 9 6 - 20 MG/DL    Creatinine 0.51 (L) 0.55 - 1.02 MG/DL    BUN/Creatinine ratio 18 12 - 20      GFR est AA >60 >60 ml/min/1.73m2    GFR est non-AA >60 >60 ml/min/1.73m2    Calcium 8.2 (L) 8.5 - 10.1 MG/DL    Bilirubin, total 1.2 (H) 0.2 - 1.0 MG/DL    ALT (SGPT) 23 12 - 78 U/L    AST (SGOT) 36 15 - 37 U/L    Alk.  phosphatase 64 45 - 117 U/L    Protein, total 7.1 6.4 - 8.2 g/dL    Albumin 3.4 (L) 3.5 - 5.0 g/dL    Globulin 3.7 2.0 - 4.0 g/dL    A-G Ratio 0.9 (L) 1.1 - 2.2     LIPASE    Collection Time: 06/04/21  5:50 AM   Result Value Ref Range    Lipase >3,000 (H) 73 - 393 U/L   AMYLASE    Collection Time: 06/04/21  5:50 AM   Result Value Ref Range    Amylase 419 (H) 25 - 115 U/L   CBC WITH AUTOMATED DIFF    Collection Time: 06/04/21  6:18 AM   Result Value Ref Range    WBC 5.6 3.6 - 11.0 K/uL    RBC 3.91 3.80 - 5.20 M/uL    HGB 12.5 11.5 - 16.0 g/dL    HCT 36.4 35.0 - 47.0 %    MCV 93.1 80.0 - 99.0 FL    MCH 32.0 26.0 - 34.0 PG    MCHC 34.3 30.0 - 36.5 g/dL    RDW 11.6 11.5 - 14.5 %    PLATELET 128 769 - 883 K/uL    MPV 8.5 (L) 8.9 - 12.9 FL    NRBC 0.0 0  WBC    ABSOLUTE NRBC 0.00 0.00 - 0.01 K/uL    NEUTROPHILS 70 32 - 75 %    LYMPHOCYTES 16 12 - 49 %    MONOCYTES 11 5 - 13 %    EOSINOPHILS 2 0 - 7 %    BASOPHILS 1 0 - 1 %    IMMATURE GRANULOCYTES 0 0.0 - 0.5 %    ABS. NEUTROPHILS 3.9 1.8 - 8.0 K/UL    ABS. LYMPHOCYTES 0.9 0.8 - 3.5 K/UL    ABS. MONOCYTES 0.6 0.0 - 1.0 K/UL    ABS. EOSINOPHILS 0.1 0.0 - 0.4 K/UL    ABS. BASOPHILS 0.0 0.0 - 0.1 K/UL    ABS. IMM.  GRANS. 0.0 0.00 - 0.04 K/UL    DF AUTOMATED

## 2021-06-05 LAB
ALBUMIN SERPL-MCNC: 2.7 G/DL (ref 3.5–5)
ALBUMIN/GLOB SERPL: 0.9 {RATIO} (ref 1.1–2.2)
ALP SERPL-CCNC: 51 U/L (ref 45–117)
ALT SERPL-CCNC: 19 U/L (ref 12–78)
ANION GAP SERPL CALC-SCNC: 8 MMOL/L (ref 5–15)
AST SERPL-CCNC: 20 U/L (ref 15–37)
BILIRUB SERPL-MCNC: 1.4 MG/DL (ref 0.2–1)
BUN SERPL-MCNC: 8 MG/DL (ref 6–20)
BUN/CREAT SERPL: 19 (ref 12–20)
CALCIUM SERPL-MCNC: 7.6 MG/DL (ref 8.5–10.1)
CHLORIDE SERPL-SCNC: 99 MMOL/L (ref 97–108)
CO2 SERPL-SCNC: 23 MMOL/L (ref 21–32)
CREAT SERPL-MCNC: 0.42 MG/DL (ref 0.55–1.02)
GLOBULIN SER CALC-MCNC: 3.1 G/DL (ref 2–4)
GLUCOSE SERPL-MCNC: 70 MG/DL (ref 65–100)
POTASSIUM SERPL-SCNC: 3 MMOL/L (ref 3.5–5.1)
PROT SERPL-MCNC: 5.8 G/DL (ref 6.4–8.2)
SODIUM SERPL-SCNC: 130 MMOL/L (ref 136–145)

## 2021-06-05 PROCEDURE — 65270000029 HC RM PRIVATE

## 2021-06-05 PROCEDURE — 74011250636 HC RX REV CODE- 250/636: Performed by: INTERNAL MEDICINE

## 2021-06-05 PROCEDURE — 77030038269 HC DRN EXT URIN PURWCK BARD -A

## 2021-06-05 PROCEDURE — 80053 COMPREHEN METABOLIC PANEL: CPT

## 2021-06-05 PROCEDURE — 36415 COLL VENOUS BLD VENIPUNCTURE: CPT

## 2021-06-05 RX ORDER — POTASSIUM CHLORIDE 7.45 MG/ML
10 INJECTION INTRAVENOUS
Status: COMPLETED | OUTPATIENT
Start: 2021-06-05 | End: 2021-06-05

## 2021-06-05 RX ORDER — MAGNESIUM SULFATE HEPTAHYDRATE 40 MG/ML
2 INJECTION, SOLUTION INTRAVENOUS ONCE
Status: COMPLETED | OUTPATIENT
Start: 2021-06-05 | End: 2021-06-05

## 2021-06-05 RX ADMIN — POTASSIUM CHLORIDE 10 MEQ: 10 INJECTION, SOLUTION INTRAVENOUS at 13:41

## 2021-06-05 RX ADMIN — KETOROLAC TROMETHAMINE 15 MG: 30 INJECTION, SOLUTION INTRAMUSCULAR; INTRAVENOUS at 18:30

## 2021-06-05 RX ADMIN — SODIUM CHLORIDE, POTASSIUM CHLORIDE, SODIUM LACTATE AND CALCIUM CHLORIDE 150 ML/HR: 600; 310; 30; 20 INJECTION, SOLUTION INTRAVENOUS at 21:12

## 2021-06-05 RX ADMIN — POTASSIUM CHLORIDE 10 MEQ: 10 INJECTION, SOLUTION INTRAVENOUS at 11:51

## 2021-06-05 RX ADMIN — KETOROLAC TROMETHAMINE 15 MG: 30 INJECTION, SOLUTION INTRAMUSCULAR; INTRAVENOUS at 05:24

## 2021-06-05 RX ADMIN — Medication 10 ML: at 21:08

## 2021-06-05 RX ADMIN — SODIUM CHLORIDE, POTASSIUM CHLORIDE, SODIUM LACTATE AND CALCIUM CHLORIDE 150 ML/HR: 600; 310; 30; 20 INJECTION, SOLUTION INTRAVENOUS at 05:27

## 2021-06-05 RX ADMIN — POTASSIUM CHLORIDE 10 MEQ: 10 INJECTION, SOLUTION INTRAVENOUS at 15:45

## 2021-06-05 RX ADMIN — Medication 10 ML: at 05:25

## 2021-06-05 RX ADMIN — KETOROLAC TROMETHAMINE 15 MG: 30 INJECTION, SOLUTION INTRAMUSCULAR; INTRAVENOUS at 11:29

## 2021-06-05 RX ADMIN — ENOXAPARIN SODIUM 30 MG: 30 INJECTION SUBCUTANEOUS at 09:57

## 2021-06-05 RX ADMIN — MAGNESIUM SULFATE HEPTAHYDRATE 2 G: 40 INJECTION, SOLUTION INTRAVENOUS at 11:51

## 2021-06-05 NOTE — PROGRESS NOTES
Bedside and Verbal shift change report given to Kwadwo Alvarez RN (oncoming nurse) by Tiffany Jenkins RN (offgoing nurse). Report included the following information SBAR, Kardex, MAR, Accordion and Recent Results.

## 2021-06-05 NOTE — PROGRESS NOTES
Hermilo Stover donna [address]  [address] [place] [address], [address] [place] [address]  [phone]      Medical Progress Note      NAME: Naren Colbert   :  7/3/1928  MRM:  [id number]    Date/Time: 2021  2:46 PM           Assessment / Plan:               #Acute pancreatitis: No imaging or LFT abnls, TG normal. No meds contributing. Symptomatically improved this morning   - IVF              - Ketorolac 15mg q6h              - Hold on MRCP for now   - Labs and lipase tmr   - ADAT today     #Hyponatremia: Mild. Appears volume down on admit, improving              - IVF, trend; if not improving may need to consider malignancy work up    #Dementia: Aricept     #Depression: Fluoxetine     #Hx CVA: R sided weakness; asa, statin     #Hypothyroidism: LT4                    Care Plan discussed with: Patient, Family and Nursing Staff    Discussed:  Care Plan    Prophylaxis:  Lovenox    Disposition:  SNF/LTC           ___________________________________________________    Attending Physician: Taylor Desai MD        Subjective:     Chief Complaint:  Miguel Mulliken \"great\" today; ready to try some food. Does feel a bit weaker though    ROS:  (bold if positive, if negative)    Tolerating PT          Objective:       Vitals:          Last 24hrs VS reviewed since prior progress note.  Most recent are:    Visit Vitals  BP (!) 154/69 (BP 1 Location: Right upper arm, BP Patient Position: At rest)   Pulse 60   Temp 97.3 °F (36.3 °C)   Resp 18   Ht 5' 1\" (1.549 m)   Wt 41.7 kg (92 lb)   SpO2 94%   BMI 17.38 kg/m²     SpO2 Readings from Last 6 Encounters:   21 94%   21 96%   21 97%   21 98%   21 95%   20 97%            Intake/Output Summary (Last 24 hours) at 2021 1446  Last data filed at 2021 1900  Gross per 24 hour   Intake 1040 ml   Output    Net 1040 ml          Exam:     Physical Exam:    Gen:  Well-developed, well-nourished, in no acute distress  HEENT:  Pink conjunctivae, PERRL, hearing intact to voice, moist mucous membranes  Neck:  Supple, without masses, thyroid non-tender  Resp:  No accessory muscle use, clear breath sounds without wheezes rales or rhonchi  Card:  No murmurs, normal S1, S2 without thrills, bruits or peripheral edema  Abd:  Soft, non-tender, non-distended, normoactive bowel sounds are present  Musc:  No cyanosis or clubbing  Skin:  No rashes or ulcers, skin turgor is good  Neuro:  Cranial nerves 3-12 are grossly intact,  strength is 5/5 bilaterally and dorsi / plantarflexion is 5/5 bilaterally, follows commands appropriately  Psych:  Good insight, oriented to person, place and time, alert       Medications Reviewed: (see below)    Lab Data Reviewed: (see below)    ______________________________________________________________________    Medications:     Current Facility-Administered Medications   Medication Dose Route Frequency    potassium chloride 10 mEq in 100 ml IVPB  10 mEq IntraVENous Q1H    sodium chloride (NS) flush 5-40 mL  5-40 mL IntraVENous Q8H    sodium chloride (NS) flush 5-40 mL  5-40 mL IntraVENous PRN    acetaminophen (TYLENOL) tablet 650 mg  650 mg Oral Q6H PRN    Or    acetaminophen (TYLENOL) suppository 650 mg  650 mg Rectal Q6H PRN    polyethylene glycol (MIRALAX) packet 17 g  17 g Oral DAILY PRN    enoxaparin (LOVENOX) injection 30 mg  30 mg SubCUTAneous DAILY    promethazine (PHENERGAN) tablet 12.5 mg  12.5 mg Oral Q6H PRN    Or    ondansetron (ZOFRAN) injection 4 mg  4 mg IntraVENous Q6H PRN    lactated Ringers infusion  150 mL/hr IntraVENous CONTINUOUS    HYDROmorphone (DILAUDID) syringe 0.2 mg  0.2 mg IntraVENous Q3H PRN    ketorolac (TORADOL) injection 15 mg  15 mg IntraVENous Q6H            Lab Review:     Recent Labs     06/04/21  0618 06/03/21  0424   WBC 5.6 4.4   HGB 12.5 12.1   HCT 36.4 35.8    177     Recent Labs     06/05/21  0500 06/04/21  0550 06/03/21  0424   * 126* 135*   K 3.0* 3.6 3.5   CL 99 95* 104   CO2 23 27 24   GLU 70 104* 90   BUN 8 9 10   CREA 0.42* 0.51* 0.50*   CA 7.6* 8.2* 7.7*   MG  --   --  1.9   PHOS  --   --  2.8   ALB 2.7* 3.4* 2.8*   ALT 19 23 20     No components found for: [place]

## 2021-06-05 NOTE — ROUTINE PROCESS
Bedside shift change report given to Serge Rogel RN (oncoming nurse) by Ginny Lester RN (offgoing nurse). Report included the following information SBAR, Kardex, Procedure Summary, Intake/Output, MAR, Accordion, Recent Results and Med Rec Status.

## 2021-06-06 LAB
ALBUMIN SERPL-MCNC: 2.7 G/DL (ref 3.5–5)
ALBUMIN/GLOB SERPL: 0.9 {RATIO} (ref 1.1–2.2)
ALP SERPL-CCNC: 52 U/L (ref 45–117)
ALT SERPL-CCNC: 17 U/L (ref 12–78)
ANION GAP SERPL CALC-SCNC: 4 MMOL/L (ref 5–15)
APPEARANCE UR: CLEAR
AST SERPL-CCNC: 12 U/L (ref 15–37)
BACTERIA URNS QL MICRO: NEGATIVE /HPF
BASOPHILS # BLD: 0 K/UL (ref 0–0.1)
BASOPHILS NFR BLD: 1 % (ref 0–1)
BILIRUB SERPL-MCNC: 0.7 MG/DL (ref 0.2–1)
BILIRUB UR QL: NEGATIVE
BUN SERPL-MCNC: 8 MG/DL (ref 6–20)
BUN/CREAT SERPL: 19 (ref 12–20)
CALCIUM SERPL-MCNC: 7.8 MG/DL (ref 8.5–10.1)
CHLORIDE SERPL-SCNC: 106 MMOL/L (ref 97–108)
CO2 SERPL-SCNC: 27 MMOL/L (ref 21–32)
COLOR UR: ABNORMAL
CREAT SERPL-MCNC: 0.43 MG/DL (ref 0.55–1.02)
DIFFERENTIAL METHOD BLD: ABNORMAL
EOSINOPHIL # BLD: 0.1 K/UL (ref 0–0.4)
EOSINOPHIL NFR BLD: 3 % (ref 0–7)
EPITH CASTS URNS QL MICRO: ABNORMAL /LPF
ERYTHROCYTE [DISTWIDTH] IN BLOOD BY AUTOMATED COUNT: 11.7 % (ref 11.5–14.5)
GLOBULIN SER CALC-MCNC: 2.9 G/DL (ref 2–4)
GLUCOSE SERPL-MCNC: 87 MG/DL (ref 65–100)
GLUCOSE UR STRIP.AUTO-MCNC: NEGATIVE MG/DL
HCT VFR BLD AUTO: 36.2 % (ref 35–47)
HGB BLD-MCNC: 12.4 G/DL (ref 11.5–16)
HGB UR QL STRIP: ABNORMAL
HYALINE CASTS URNS QL MICRO: ABNORMAL /LPF (ref 0–5)
IMM GRANULOCYTES # BLD AUTO: 0 K/UL (ref 0–0.04)
IMM GRANULOCYTES NFR BLD AUTO: 0 % (ref 0–0.5)
KETONES UR QL STRIP.AUTO: NEGATIVE MG/DL
LEUKOCYTE ESTERASE UR QL STRIP.AUTO: ABNORMAL
LIPASE SERPL-CCNC: 117 U/L (ref 73–393)
LYMPHOCYTES # BLD: 1.1 K/UL (ref 0.8–3.5)
LYMPHOCYTES NFR BLD: 28 % (ref 12–49)
MAGNESIUM SERPL-MCNC: 2.1 MG/DL (ref 1.6–2.4)
MCH RBC QN AUTO: 31.8 PG (ref 26–34)
MCHC RBC AUTO-ENTMCNC: 34.3 G/DL (ref 30–36.5)
MCV RBC AUTO: 92.8 FL (ref 80–99)
MONOCYTES # BLD: 0.5 K/UL (ref 0–1)
MONOCYTES NFR BLD: 13 % (ref 5–13)
NEUTS SEG # BLD: 2.1 K/UL (ref 1.8–8)
NEUTS SEG NFR BLD: 55 % (ref 32–75)
NITRITE UR QL STRIP.AUTO: NEGATIVE
NRBC # BLD: 0 K/UL (ref 0–0.01)
NRBC BLD-RTO: 0 PER 100 WBC
PH UR STRIP: 7 [PH] (ref 5–8)
PHOSPHATE SERPL-MCNC: 2.5 MG/DL (ref 2.6–4.7)
PLATELET # BLD AUTO: 161 K/UL (ref 150–400)
PMV BLD AUTO: 8.7 FL (ref 8.9–12.9)
POTASSIUM SERPL-SCNC: 3.3 MMOL/L (ref 3.5–5.1)
PROT SERPL-MCNC: 5.6 G/DL (ref 6.4–8.2)
PROT UR STRIP-MCNC: NEGATIVE MG/DL
RBC # BLD AUTO: 3.9 M/UL (ref 3.8–5.2)
RBC #/AREA URNS HPF: ABNORMAL /HPF (ref 0–5)
SODIUM SERPL-SCNC: 137 MMOL/L (ref 136–145)
SP GR UR REFRACTOMETRY: 1 (ref 1–1.03)
UA: UC IF INDICATED,UAUC: ABNORMAL
UROBILINOGEN UR QL STRIP.AUTO: 1 EU/DL (ref 0.2–1)
WBC # BLD AUTO: 3.8 K/UL (ref 3.6–11)
WBC URNS QL MICRO: ABNORMAL /HPF (ref 0–4)

## 2021-06-06 PROCEDURE — 36415 COLL VENOUS BLD VENIPUNCTURE: CPT

## 2021-06-06 PROCEDURE — 80053 COMPREHEN METABOLIC PANEL: CPT

## 2021-06-06 PROCEDURE — 84100 ASSAY OF PHOSPHORUS: CPT

## 2021-06-06 PROCEDURE — 74011250637 HC RX REV CODE- 250/637: Performed by: INTERNAL MEDICINE

## 2021-06-06 PROCEDURE — 83690 ASSAY OF LIPASE: CPT

## 2021-06-06 PROCEDURE — 74011250636 HC RX REV CODE- 250/636: Performed by: INTERNAL MEDICINE

## 2021-06-06 PROCEDURE — 85025 COMPLETE CBC W/AUTO DIFF WBC: CPT

## 2021-06-06 PROCEDURE — 81001 URINALYSIS AUTO W/SCOPE: CPT

## 2021-06-06 PROCEDURE — 65270000029 HC RM PRIVATE

## 2021-06-06 PROCEDURE — 83735 ASSAY OF MAGNESIUM: CPT

## 2021-06-06 RX ORDER — POTASSIUM CHLORIDE 7.45 MG/ML
10 INJECTION INTRAVENOUS
Status: COMPLETED | OUTPATIENT
Start: 2021-06-06 | End: 2021-06-06

## 2021-06-06 RX ADMIN — KETOROLAC TROMETHAMINE 15 MG: 30 INJECTION, SOLUTION INTRAMUSCULAR; INTRAVENOUS at 05:07

## 2021-06-06 RX ADMIN — KETOROLAC TROMETHAMINE 15 MG: 30 INJECTION, SOLUTION INTRAMUSCULAR; INTRAVENOUS at 11:44

## 2021-06-06 RX ADMIN — KETOROLAC TROMETHAMINE 15 MG: 30 INJECTION, SOLUTION INTRAMUSCULAR; INTRAVENOUS at 00:15

## 2021-06-06 RX ADMIN — Medication 10 ML: at 05:07

## 2021-06-06 RX ADMIN — ENOXAPARIN SODIUM 30 MG: 30 INJECTION SUBCUTANEOUS at 09:04

## 2021-06-06 RX ADMIN — SODIUM CHLORIDE, POTASSIUM CHLORIDE, SODIUM LACTATE AND CALCIUM CHLORIDE 150 ML/HR: 600; 310; 30; 20 INJECTION, SOLUTION INTRAVENOUS at 12:38

## 2021-06-06 RX ADMIN — Medication 10 ML: at 20:05

## 2021-06-06 RX ADMIN — ACETAMINOPHEN 650 MG: 325 TABLET ORAL at 20:00

## 2021-06-06 RX ADMIN — POTASSIUM CHLORIDE 10 MEQ: 7.46 INJECTION, SOLUTION INTRAVENOUS at 09:44

## 2021-06-06 NOTE — PROGRESS NOTES
Bedside shift change report given to Kenroy Yu RN (oncoming nurse) by Grace Dominguez RN (offgoing nurse). Report included the following information SBAR, Kardex, Intake/Output, MAR, Accordion, Recent Results and Med Rec Status.

## 2021-06-06 NOTE — PROGRESS NOTES
Bedside shift change report given to Tea Ding RN (oncoming nurse) by Abbey Byrne RN (offgoing nurse). Report included the following information SBAR, Kardex, Procedure Summary, Intake/Output, MAR, Recent Results.

## 2021-06-06 NOTE — PROGRESS NOTES
Hermilo Gillespieelsen [facility] 79  [address] [facility], [address] [place] [address]  [phone]      Medical Progress Note      NAME: Jeff Shrestha   :  7/3/1928  MRM:  [id number]    Date/Time: 2021  2:46 PM           Assessment / Plan:     #Acute pancreatitis: No imaging or LFT abnls, TG normal. No meds contributing. Lipase normal today and she tolerated 75% of meal. Given her rapid return of sx following last discharge, I would like to watch her overnight to ensure resolution. Buff lytes     #Hyponatremia: Mild. Appeared volume down on admit   - stop IVF today    #Dementia: Aricept    #Depression: Fluoxetine    #Hx CVA: R sided weakness; asa, statin    #Hypothyroidism: LT4                    Care Plan discussed with: Patient, Family and Nursing Staff    Discussed:  Care Plan    Prophylaxis:  Lovenox    Disposition:  SNF/LTC           ___________________________________________________    Attending Physician: Narinder Zheng MD        Subjective:     Chief Complaint:  Verlie Opoka, lethargic today but wakes. Tolerated meal quite well today  ROS:  (bold if positive, if negative)    Tolerating PT          Objective:       Vitals:          Last 24hrs VS reviewed since prior progress note.  Most recent are:    Visit Vitals  BP (!) 160/68 (BP 1 Location: Right upper arm, BP Patient Position: At rest)   Pulse (!) 58   Temp 97.7 °F (36.5 °C)   Resp 17   Ht 5' 1\" (1.549 m)   Wt 41.7 kg (92 lb)   SpO2 95%   BMI 17.38 kg/m²     SpO2 Readings from Last 6 Encounters:   21 95%   21 96%   21 97%   21 98%   21 95%   20 97%            Intake/Output Summary (Last 24 hours) at 2021 1446  Last data filed at 2021 0948  Gross per 24 hour   Intake 1550 ml   Output 1200 ml   Net 350 ml          Exam:     Physical Exam:    Gen:  Well-developed, well-nourished, in no acute distress  HEENT:  Pink conjunctivae, PERRL, hearing intact to voice, moist mucous membranes  Neck:  Supple, without masses, thyroid non-tender  Resp:  No accessory muscle use, clear breath sounds without wheezes rales or rhonchi  Card:  No murmurs, normal S1, S2 without thrills, bruits or peripheral edema  Abd:  Soft, non-tender, non-distended, normoactive bowel sounds are present  Musc:  No cyanosis or clubbing  Skin:  No rashes or ulcers, skin turgor is good  Neuro:  Cranial nerves 3-12 are grossly intact,  strength is 5/5 bilaterally and dorsi / plantarflexion is 5/5 bilaterally, follows commands appropriately  Psych:  Good insight, oriented to person, place and time, alert       Medications Reviewed: (see below)    Lab Data Reviewed: (see below)    ______________________________________________________________________    Medications:     Current Facility-Administered Medications   Medication Dose Route Frequency    sodium chloride (NS) flush 5-40 mL  5-40 mL IntraVENous Q8H    sodium chloride (NS) flush 5-40 mL  5-40 mL IntraVENous PRN    acetaminophen (TYLENOL) tablet 650 mg  650 mg Oral Q6H PRN    Or    acetaminophen (TYLENOL) suppository 650 mg  650 mg Rectal Q6H PRN    polyethylene glycol (MIRALAX) packet 17 g  17 g Oral DAILY PRN    enoxaparin (LOVENOX) injection 30 mg  30 mg SubCUTAneous DAILY    promethazine (PHENERGAN) tablet 12.5 mg  12.5 mg Oral Q6H PRN    Or    ondansetron (ZOFRAN) injection 4 mg  4 mg IntraVENous Q6H PRN    HYDROmorphone (DILAUDID) syringe 0.2 mg  0.2 mg IntraVENous Q3H PRN            Lab Review:     Recent Labs     06/06/21  0024 06/04/21  0618   WBC 3.8 5.6   HGB 12.4 12.5   HCT 36.2 36.4    173     Recent Labs     06/06/21  0024 06/05/21  0500 06/04/21  0550    130* 126*   K 3.3* 3.0* 3.6    99 95*   CO2 27 23 27   GLU 87 70 104*   BUN 8 8 9   CREA 0.43* 0.42* 0.51*   CA 7.8* 7.6* 8.2*   MG 2.1  --   --    PHOS 2.5*  --   --    ALB 2.7* 2.7* 3.4*   ALT 17 19 23     No components found for: [place]

## 2021-06-07 ENCOUNTER — APPOINTMENT (OUTPATIENT)
Dept: GENERAL RADIOLOGY | Age: 86
DRG: 439 | End: 2021-06-07
Attending: INTERNAL MEDICINE
Payer: MEDICARE

## 2021-06-07 VITALS
TEMPERATURE: 97.6 F | HEIGHT: 61 IN | DIASTOLIC BLOOD PRESSURE: 65 MMHG | BODY MASS INDEX: 17.37 KG/M2 | WEIGHT: 92 LBS | RESPIRATION RATE: 18 BRPM | OXYGEN SATURATION: 94 % | SYSTOLIC BLOOD PRESSURE: 158 MMHG | HEART RATE: 66 BPM

## 2021-06-07 LAB
ALBUMIN SERPL-MCNC: 2.6 G/DL (ref 3.5–5)
ALBUMIN/GLOB SERPL: 0.9 {RATIO} (ref 1.1–2.2)
ALP SERPL-CCNC: 49 U/L (ref 45–117)
ALT SERPL-CCNC: 17 U/L (ref 12–78)
ANION GAP SERPL CALC-SCNC: 4 MMOL/L (ref 5–15)
AST SERPL-CCNC: 17 U/L (ref 15–37)
BILIRUB SERPL-MCNC: 0.5 MG/DL (ref 0.2–1)
BUN SERPL-MCNC: 8 MG/DL (ref 6–20)
BUN/CREAT SERPL: 18 (ref 12–20)
CALCIUM SERPL-MCNC: 7.7 MG/DL (ref 8.5–10.1)
CHLORIDE SERPL-SCNC: 107 MMOL/L (ref 97–108)
CO2 SERPL-SCNC: 28 MMOL/L (ref 21–32)
CREAT SERPL-MCNC: 0.44 MG/DL (ref 0.55–1.02)
GLOBULIN SER CALC-MCNC: 2.9 G/DL (ref 2–4)
GLUCOSE SERPL-MCNC: 100 MG/DL (ref 65–100)
LIPASE SERPL-CCNC: 356 U/L (ref 73–393)
MAGNESIUM SERPL-MCNC: 1.8 MG/DL (ref 1.6–2.4)
POTASSIUM SERPL-SCNC: 3.3 MMOL/L (ref 3.5–5.1)
PROT SERPL-MCNC: 5.5 G/DL (ref 6.4–8.2)
SODIUM SERPL-SCNC: 139 MMOL/L (ref 136–145)

## 2021-06-07 PROCEDURE — 80053 COMPREHEN METABOLIC PANEL: CPT

## 2021-06-07 PROCEDURE — 74011250636 HC RX REV CODE- 250/636: Performed by: INTERNAL MEDICINE

## 2021-06-07 PROCEDURE — 71045 X-RAY EXAM CHEST 1 VIEW: CPT

## 2021-06-07 PROCEDURE — 74011250637 HC RX REV CODE- 250/637: Performed by: INTERNAL MEDICINE

## 2021-06-07 PROCEDURE — 83690 ASSAY OF LIPASE: CPT

## 2021-06-07 PROCEDURE — 83735 ASSAY OF MAGNESIUM: CPT

## 2021-06-07 PROCEDURE — 36415 COLL VENOUS BLD VENIPUNCTURE: CPT

## 2021-06-07 RX ORDER — DOCUSATE SODIUM 100 MG/1
100 CAPSULE, LIQUID FILLED ORAL DAILY
Status: DISCONTINUED | OUTPATIENT
Start: 2021-06-07 | End: 2021-06-07 | Stop reason: HOSPADM

## 2021-06-07 RX ORDER — SAME BUTANEDISULFONATE/BETAINE 400-600 MG
250 POWDER IN PACKET (EA) ORAL 2 TIMES DAILY
Qty: 14 CAPSULE | Refills: 0 | Status: SHIPPED | OUTPATIENT
Start: 2021-06-07 | End: 2021-06-14

## 2021-06-07 RX ORDER — MAGNESIUM SULFATE HEPTAHYDRATE 40 MG/ML
2 INJECTION, SOLUTION INTRAVENOUS ONCE
Status: COMPLETED | OUTPATIENT
Start: 2021-06-07 | End: 2021-06-07

## 2021-06-07 RX ORDER — L. ACIDOPHILUS/L.BULGARICUS 100MM CELL
1 GRANULES IN PACKET (EA) ORAL 2 TIMES DAILY
Status: DISCONTINUED | OUTPATIENT
Start: 2021-06-07 | End: 2021-06-07

## 2021-06-07 RX ORDER — DOCUSATE SODIUM 100 MG/1
100 CAPSULE, LIQUID FILLED ORAL DAILY
Status: DISCONTINUED | OUTPATIENT
Start: 2021-06-07 | End: 2021-06-07

## 2021-06-07 RX ADMIN — Medication 1 CAPSULE: at 11:10

## 2021-06-07 RX ADMIN — ENOXAPARIN SODIUM 30 MG: 30 INJECTION SUBCUTANEOUS at 08:50

## 2021-06-07 RX ADMIN — DOCUSATE SODIUM 100 MG: 100 CAPSULE, LIQUID FILLED ORAL at 11:10

## 2021-06-07 RX ADMIN — POTASSIUM BICARBONATE 40 MEQ: 782 TABLET, EFFERVESCENT ORAL at 08:50

## 2021-06-07 RX ADMIN — Medication 10 ML: at 05:41

## 2021-06-07 RX ADMIN — MAGNESIUM SULFATE HEPTAHYDRATE 2 G: 40 INJECTION, SOLUTION INTRAVENOUS at 08:50

## 2021-06-07 NOTE — PROGRESS NOTES
6/7/2021  CM ADDENDUM: Pt has been accepted by [facility] and a skilled bed is available today. RN, please call report to [facility] at [phone]  Family will transport after lunch. Transition of Care Plan to SNF/Rehab    SNF/Rehab Transition:  [facility] has been [facility] and meets criteria for admission. Patient will transported by family and expected to leave at 2 p.m    Communication to Patient/Family:  Met with patient and family (identified care giver) and they are agreeable to the transition plan. Communication to SNF/Rehab:  Bedside RN, [address] , has been notified to update the transition plan to the facility and call report (phone number [phone]). Discharge information has been updated on the AVS.     Discharge instructions to be fax'd to facility at [facility] # [phone]). Nursing Please include all hard scripts for controlled substances, med rec and dc summary, and AVS in packet. Reviewed and confirmed with facility, [facility], can manage the patient care needs for the following:     SNF/Rehab Transition:  Patient to follow-up with Home Health:  [facility], other  ,none)  PCP/Specialist: Dr. Ray Cullen    Reviewed and confirmed with facility, [facility] they can manage the patient care needs for the following:     Panda Hidalgo with (X) only those applicable:    Medication:  []  Medications will be available at the facility  []  IV Antibiotics N/A  []  Controlled Substance - hard copy to be sent with patient   []  Weekly Labs   Documents:  [] Hard RX  [x] MAR  [] Kardex  [x] AVS  []Transfer Summary  []Discharge   Equipment:  []  CPAP/BiPAP  []  Wound Vacuum  []  Schwartz or Urinary Device  []  PICC/Central Line  []  Nebulizer  []  Ventilator   Treatment:  []Isolation (for MRSA, VRE, etc.)  []Surgical Drain Management  []Tracheostomy Care  []Dressing Changes  []Dialysis with transportation and chair time N/A  []PEG Care  []Oxygen  []Daily Weights for Heart Failure Dietary:  []Any diet limitations  []Tube Feedings   []Total Parenteral Management (TPN)   Eligible for Medicaid Long Term Services and Supports  Yes:  [] Eligible for medical assistance or will become eligible within 180 days and UAI completed. [] Provider/Patient and/or support system has requested screening. [] UAI copy provided to patient or responsible party,N/A. [] UAI unavailable at discharge will send once processed to SNF provider. [] UAI unavailable at discharged mailed to patient  No:   [] Private pay and is not financially eligible for Medicaid within the next 180 days. [] Reside out-of-state. [] A residents of a state owned/operated facility that is licensed  by [address] [facility] or [facility]  [] Enrollment in [facility] hospice services  []  [address]  [] Patient /Family declines to have screening completed or provide financial information for screening     Financial Resources:  Medicaid    [] Initiated and application pending   [] Full coverage     Advanced Care Plan:  []Surrogate Decision Maker of Care  []POA  []Communicated Code Status DNR (DDNR\", \"Full\")    Other     Financial Resources:  Medicaid    [] Initiated and application pending   [] Full coverage     Advanced Care Plan:  []Surrogate Decision Maker of Care  []POA  []Communicated Code Status DNR (DDNR\", \"Full\")    Other Rapid COVID 19 test for placement was negative on 6/4. Pt has also been vaccinated.    Nelia

## 2021-06-07 NOTE — PROGRESS NOTES
Bedside and Verbal shift change report given to Amy Lutz (oncoming nurse) by Sammy Santos (offgoing nurse). Report included the following information SBAR, Kardex and Recent Results.

## 2021-06-07 NOTE — DISCHARGE INSTRUCTIONS
HOSPITALIST DISCHARGE INSTRUCTIONS  NAME: Alirio Rudd   :  7/3/1928   MRN:  [id number]     Date/Time:  2021 11:01 AM    ADMIT DATE: 2021     DISCHARGE DATE: 2021     ADMITTING DIAGNOSIS:  Pancreatitis     DISCHARGE DIAGNOSIS:  As above     MEDICATIONS:  PLEASE NOTE THAT THOUGH UNCOMMON, ATORVASTATIN CAN LEAD TO PANCREATITIS. SINCE WE HAVE NO OTHER ETIOLOGY AS THIS TIME IF ABLE TO STOP PLEASE DO SO     PLEASE NOTE YOU WILL NEED TO ABSTAIN FROM HIGH FAT FOODS LIKE CHEESE, HIGH FAT CONTENT MILK, HEAVY SAUCES, ALCOHOL      · It is important that you take the medication exactly as they are prescribed. · Keep your medication in the bottles provided by the pharmacist and keep a list of the medication names, dosages, and times to be taken in your wallet. · Do not take other medications without consulting your doctor. Pain Management: per above medications    What to do at Home    Recommended diet:  See above     Recommended activity: Activity as tolerated    If you experience any of the following symptoms then please call your primary care physician or return to the emergency room if you cannot get hold of your doctor:  Fever, chills, nausea, vomiting, diarrhea, change in mentation, falling, bleeding, shortness of breath, chest pain     Follow Up:  Dr. Maricarmen Paredes MD  you are to call and set up an appointment to see them in 2 weeks. Follow-up Information     Follow up With Specialties Details Why Contact Info    Maricarmen Paredes MD Family Medicine   [address]  [phone]          Information obtained by :  I understand that if any problems occur once I am at home I am to contact my physician. I understand and acknowledge receipt of the instructions indicated above.                                                                                                                                            Physician's or R.N.'s Signature Date/Time                                                                                                                                              Patient or Representative Signature                                                          Date/Time

## 2021-06-07 NOTE — PROGRESS NOTES
6/7/2021   CARE MANAGEMENT NOTE:  CM reviewed EMR and handoff received from ER  Marianne Fisher). READMISSION:  Pt was admitted to [facility] from 6/2/21 - 6/3/21 with pancreatitis. She returned home without any homecare. Pt was readmitted on 6/4 with acute pancreatitis. Reportedly, pt resides at [facility]. Son, [phone] is a family contact. RUR 17%    Transition Plan of Care:  1. SNF - accepted by [facility] and skilled bed is available today. 2.  COVID 19 (Rapid) test for placement was negative on 6/4. No further testing required. 3.  CXR is requested by SNF  4. Family will transport pt to SNF    CM will continue to follow pt for SNF (anticipate discharge today)  SHAMAR Sharp

## 2021-06-09 NOTE — PROGRESS NOTES
PLEASE NOTE PT NEEDS TO HAVE A LOW FAT DIET.  PLEASE AVOID ALCOHOL, HIGH FAT FOODS LIKE DAIRY BASED SAUCES, CHEESE, HIGH FAT MILK

## 2021-06-11 NOTE — PROGRESS NOTES
Physician Progress Note      PATIENTAlice Vergara  CSN #:                  [id number]  :                       7/3/1928  ADMIT DATE:       2021 5:35 AM  [address] DATE:        2021 2:23 PM  RESPONDING  PROVIDER #:        Anthony Victor MD          QUERY TEXTDawson Seen Morning,    This patient was admitted on  - 2021    A Registered Dietitian  was consulted on  for BMI of 17.38. If possible, please document in progress notes and discharge summary if you are evaluating and /or treating any of the following: The medical record reflects the following:  Risk Factors:  Hx of CVA , Acute pancreatitis  Clinical Indicators:  Per RD assessment, pt had 50-% or lss of est. energy requirements intake for 5 ore more days. Moderate body fat loss, Fat overlying ribs, Triceps, Moderate muscle mass loss, Temples, and on hand. She did not have a current weight but she reports that 2 years ago she weighed approx. 100lbs and she has since lost 10 lbs and has been trying to gain that back. Treatment:  RD consulted, Adding Ensure HP BID once diet advanced, New daily weight. Thank you,  Katerine Cruzwood, [address] [facility], [address] [place] [address], [facility]  [phone]  Options provided:  -- Protein calorie malnutrition moderate  -- Protein calorie malnutrition severe  -- Other - I will add my own diagnosis  -- Disagree - Not applicable / Not valid  -- Disagree - Clinically unable to determine / Unknown  -- Refer to Clinical Documentation Reviewer    PROVIDER RESPONSE TEXT:    This patient has moderate protein calorie malnutrition.     Query created by: Maura Eugene on 2021 8:58 AM      Electronically signed by:  Anthony Victor MD 6/10/2021 9:58 PM

## 2021-06-27 NOTE — DISCHARGE SUMMARY
Physician Discharge Summary     Patient ID:  Leonarda Powell  [id number]  12 y.o.  7/3/1928    Admit date: 6/4/2021    Discharge date and time: 6/7/2021    Admission Diagnoses: Acute pancreatitis [K85.90]    Discharge Diagnoses/Hospital Course   Ms. vEelyn Garay is a 79 yo female with PMH of dementia, depression, CVA, hypothyroidism admitted for pancreatitis. #Acute pancreatitis: No imaging or LFT abnormalities, TG normal. Lower likelihood that statin is contributing but will stop at benefit < risk considering her age and pancreatitis. Counseled on low fat diet. May need MRCP if recurs after the cessation of statin. She rapidly improved with IVF's and conservative measures. Tolerated diet without N/V/ab pain and lipase downtrended                 #Hyponatremia: resolved. Na+ 139 at discharge     #Dementia: on Aricept     #Depression: on Fluoxetine     #Hx CVA: R sided weakness at baseline per report but not noted on exam; on asa, statin     #Hypothyroidism: on levothyroxine     PCP: Vanessa uHertas MD     Consults: None    Discharge Exam:  Visit Vitals  BP (!) 158/65 (BP 1 Location: Left upper arm, BP Patient Position: At rest)   Pulse 66   Temp 97.6 °F (36.4 °C)   Resp 18   Ht 5' 1\" (1.549 m)   Wt 41.7 kg (92 lb)   SpO2 94%   BMI 17.38 kg/m²        Gen:  Well-developed, well-nourished, in no acute distress  HEENT:  Pink conjunctivae, PERRL, hearing intact to voice, moist mucous membranes  Neck:  Supple, without masses, thyroid non-tender  Resp:  No accessory muscle use, clear breath sounds without wheezes rales or rhonchi  Card:  No murmurs, normal S1, S2 without thrills, bruits or peripheral edema  Abd:  Soft, non-tender, non-distended, normoactive bowel sounds are present  Musc:  No cyanosis or clubbing  Skin:  No rashes or ulcers, skin turgor is good  Neuro:  CN II - XII intact.  No focal deficits noted   Psych:  Poor insight but pleasant     Disposition: SNF    Patient Instructions:   Discharge Medication List as of 6/7/2021  1:19 PM      START taking these medications    Details   Saccharomyces boulardii (Florastor) 250 mg capsule Take 1 Capsule by mouth two (2) times a day for 7 days. , Normal, Disp-14 Capsule, R-0         CONTINUE these medications which have NOT CHANGED    Details   ondansetron (Zofran ODT) 4 mg disintegrating tablet Take 1 Tablet by mouth every eight (8) hours as needed for Nausea or Vomiting., Print, Disp-20 Tablet, R-0      donepeziL (ARICEPT) 10 mg tablet Take 10 mg by mouth nightly., Historical Med      FLUoxetine (PROzac) 10 mg tablet TAKE 1/2 TABLET BY MOUTH EVERY MORNING WITH BREAKFAST, Historical Med      aspirin 81 mg chewable tablet Take 1 Tab by mouth daily. , Normal, Disp-30 Tab, R-0      levothyroxine (SYNTHROID) 25 mcg tablet TAKE 1 TABLET IN THE MORNING BEFORE BREAKFAST, Normal, Disp-60 Tab, R-6      cycloSPORINE (RESTASIS) 0.05 % ophthalmic emulsion Administer 1 Drop to both eyes two (2) times a day., Historical Med      brinzolamide (AZOPT) 1 % ophthalmic suspension Administer 1 Drop to right eye two (2) times a day., Historical Med      brimonidine-timolol (COMBIGAN) 0.2-0.5 % Drop ophthalmic solution Administer 1 Drop to right eye every twelve (12) hours. , Historical Med         STOP taking these medications       atorvastatin (LIPITOR) 40 mg tablet Comments:   Reason for Stopping:             Activity: Activity as tolerated  Diet: LOW FAT.  NO alcohol     Follow-up with Juan Manuel Burton MD   Follow-up Information     Follow up With Specialties Details Why Contact Info    Juan Manuel Burton MD [facility]     [facility]  [address] [facility] [address]  [phone]      [facility]    [address]  [place] [facility] [place]  [phone]        Approximate time spent in patient care on day of discharge: 35 minutes     Signed:  Kandis Cheadle, MD  6/27/2021  1:35 PM

## 2021-07-19 ENCOUNTER — APPOINTMENT (OUTPATIENT)
Dept: CT IMAGING | Age: 86
End: 2021-07-19
Attending: EMERGENCY MEDICINE
Payer: MEDICARE

## 2021-07-19 ENCOUNTER — HOSPITAL ENCOUNTER (EMERGENCY)
Age: 86
Discharge: HOME | End: 2021-07-19
Attending: EMERGENCY MEDICINE
Payer: MEDICARE

## 2021-07-19 VITALS
HEIGHT: 61 IN | TEMPERATURE: 98 F | OXYGEN SATURATION: 97 % | HEART RATE: 72 BPM | RESPIRATION RATE: 18 BRPM | BODY MASS INDEX: 17.94 KG/M2 | DIASTOLIC BLOOD PRESSURE: 42 MMHG | SYSTOLIC BLOOD PRESSURE: 129 MMHG | WEIGHT: 95 LBS

## 2021-07-19 DIAGNOSIS — S51.019A SKIN TEAR OF ELBOW WITHOUT COMPLICATION, UNSPECIFIED LATERALITY, INITIAL ENCOUNTER: ICD-10-CM

## 2021-07-19 DIAGNOSIS — S09.90XA INJURY OF HEAD, INITIAL ENCOUNTER: ICD-10-CM
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
## 2021-07-19 PROCEDURE — 99283 EMERGENCY DEPT VISIT LOW MDM: CPT

## 2021-07-19 PROCEDURE — 70450 CT HEAD/BRAIN W/O DYE: CPT

## 2021-07-19 NOTE — ED NOTES
The patient was discharged home by provider in stable condition. The patient is alert and oriented, in no respiratory distress and discharge vital signs obtained. The patient's diagnosis, condition and treatment were explained. The patient expressed understanding. No prescriptions given. No work/school note given. A discharge plan has been developed. A  was not involved in the process. Aftercare instructions were given. Patient discharged via wheelchair with family.

## 2021-07-19 NOTE — ED NOTES
Bedside and Verbal shift change report given to Sparkle Lambert (oncoming nurse) by Romain Chaudhary (offgoing nurse). Report included the following information SBAR and ED Summary.

## 2021-07-19 NOTE — ED PROVIDER NOTES
HPI patient slipped and fell while working out. She hit her right brow and has a 1 cm minor laceration. She also has a 7 cm skin tear to the right elbow. She denies headache, LOC, neck pain or other complaint. Past Medical History:   Diagnosis Date    Cerebral amyloid angiopathy (HCC)     CVA (cerebral vascular accident) ([facility] [address] 03/2019    aphasia and right sided weakness, s/p IV TPA with subsequent SAH/IPH, found to have change c/w CAA    Hypothyroid        Past Surgical History:   Procedure Laterality Date    HX BACK SURGERY      HX HIP REPLACEMENT      right    HX HYSTERECTOMY      HX TONSILLECTOMY           Family History:   Problem Relation Age of Onset    Dementia Mother         Dec 96yo, had memory loss for the last few years. Social History     Socioeconomic History    Marital status:      Spouse name: Not on file    Number of children: Not on file    Years of education: Not on file    Highest education level: Not on file   Occupational History    Not on file   Tobacco Use    Smoking status: Never Smoker    Smokeless tobacco: Never Used   Vaping Use    Vaping Use: Never used   Substance and Sexual Activity    Alcohol use: Yes     Alcohol/week: 0.8 standard drinks     Types: 1 Glasses of wine per week     Comment: rarely    Drug use: No    Sexual activity: Not Currently   Other Topics Concern    Not on file   Social History Narrative    Not on file     Social Determinants of Health     Financial Resource Strain:     Difficulty of Paying Living Expenses:    Food Insecurity:     Worried About Running Out of Food in the Last Year:     [address] in the Last Year:    Transportation Needs:     Lack of Transportation (Medical):      Lack of Transportation (Non-Medical):    Physical Activity:     Days of Exercise per Week:     Minutes of Exercise per Session:    Stress:     Feeling of Stress :    Social Connections:     Frequency of Communication with Friends and Family:     Frequency of Social Gatherings with Friends and Family:     Attends Alevism Services:     Active Member of Clubs or Organizations:     Attends Club or Organization Meetings:     Marital Status:    Intimate Partner Violence:     Fear of Current or Ex-Partner:     Emotionally Abused:     Physically Abused:     Sexually Abused: ALLERGIES: Patient has no known allergies. Review of Systems   Cardiovascular: Negative for chest pain. Gastrointestinal: Negative for abdominal pain. Skin: Positive for wound. Neurological: Negative for syncope and headaches. Psychiatric/Behavioral: Negative for confusion. Vitals:    07/19/21 1235   BP: (!) 129/42   Pulse: 72   Resp: 18   Temp: 98 °F (36.7 °C)   SpO2: 97%   Weight: 43.1 kg (95 lb)   Height: 5' 1\" (1.549 m)            Physical Exam  Constitutional:       General: She is not in acute distress. Appearance: She is well-developed. HENT:      Head: Normocephalic. Comments: 1 cm minor lack to the right brow  Eyes:      Comments: Left pupil is normal.  Right pupil is opacified. Cardiovascular:      Rate and Rhythm: Normal rate. Pulmonary:      Effort: Pulmonary effort is normal.      Breath sounds: Normal breath sounds. Abdominal:      Palpations: Abdomen is soft. Tenderness: There is no abdominal tenderness. Musculoskeletal:         General: Normal range of motion. Cervical back: Normal range of motion. No tenderness. Skin:     General: Skin is warm and dry. Capillary Refill: Capillary refill takes less than 2 seconds. Comments: 7 cm skin tear to the right elbow with no significant swelling   Neurological:      General: No focal deficit present. Mental Status: She is alert.    Psychiatric:         Behavior: Behavior normal.          MDM       Procedures

## 2021-07-19 NOTE — ED TRIAGE NOTES
Pt was performing \"physical activity\"  Twisted and then fell. Pt fell striking head on floor resulting in laceration right side of the head. Pt also has a skin tear to right elbow.   No LOC

## 2021-12-01 ENCOUNTER — OFFICE VISIT (OUTPATIENT)
Dept: NEUROLOGY | Age: 86
End: 2021-12-01
Payer: MEDICARE

## 2021-12-01 VITALS — BODY MASS INDEX: 17.33 KG/M2 | HEIGHT: 63 IN | WEIGHT: 97.8 LBS

## 2021-12-01 DIAGNOSIS — R06.81 WITNESSED EPISODE OF APNEA: ICD-10-CM

## 2021-12-01 DIAGNOSIS — I68.0 CEREBRAL AMYLOID ANGIOPATHY (CODE): Primary | ICD-10-CM

## 2021-12-01 DIAGNOSIS — R41.3 MEMORY LOSS: ICD-10-CM

## 2021-12-01 DIAGNOSIS — R06.83 SNORING: ICD-10-CM

## 2021-12-01 DIAGNOSIS — Z72.820 POOR SLEEP: ICD-10-CM

## 2021-12-01 DIAGNOSIS — G45.9 TIA (TRANSIENT ISCHEMIC ATTACK): ICD-10-CM

## 2021-12-01 PROCEDURE — G8419 CALC BMI OUT NRM PARAM NOF/U: HCPCS | Performed by: PSYCHIATRY & NEUROLOGY

## 2021-12-01 PROCEDURE — 99214 OFFICE O/P EST MOD 30 MIN: CPT | Performed by: PSYCHIATRY & NEUROLOGY

## 2021-12-01 PROCEDURE — 1090F PRES/ABSN URINE INCON ASSESS: CPT | Performed by: PSYCHIATRY & NEUROLOGY

## 2021-12-01 PROCEDURE — G8536 NO DOC ELDER MAL SCRN: HCPCS | Performed by: PSYCHIATRY & NEUROLOGY

## 2021-12-01 PROCEDURE — G8510 SCR DEP NEG, NO PLAN REQD: HCPCS | Performed by: PSYCHIATRY & NEUROLOGY

## 2021-12-01 PROCEDURE — G8427 DOCREV CUR MEDS BY ELIG CLIN: HCPCS | Performed by: PSYCHIATRY & NEUROLOGY

## 2021-12-01 PROCEDURE — 1101F PT FALLS ASSESS-DOCD LE1/YR: CPT | Performed by: PSYCHIATRY & NEUROLOGY

## 2021-12-01 RX ORDER — ERGOCALCIFEROL 1.25 MG/1
CAPSULE ORAL
COMMUNITY
Start: 2021-10-25

## 2021-12-01 RX ORDER — LANOLIN ALCOHOL/MO/W.PET/CERES
5000 CREAM (GRAM) TOPICAL DAILY
COMMUNITY

## 2021-12-01 RX ORDER — DONEPEZIL HYDROCHLORIDE 10 MG/1
TABLET, FILM COATED ORAL
COMMUNITY
Start: 2021-10-22

## 2021-12-01 RX ORDER — FLUOXETINE 10 MG/1
10 TABLET ORAL DAILY
COMMUNITY

## 2021-12-01 NOTE — LETTER
12/5/2021    Patient: Ji oRe   YOB: 1928   Date of Visit: 12/1/2021     Herberth Song MD  [address]  [facility]  Via Fax: [phone]    Dear Herberth Song MD,      Thank you for referring Ms. Edward Dewitt to [address] [place] [address] for evaluation. My notes for this consultation are attached. If you have questions, please do not hesitate to call me. I look forward to following your patient along with you.       Sincerely,    Lambert Ndiaye MD

## 2021-12-01 NOTE — PROGRESS NOTES
Neurology Consult Note      HISTORY PROVIDED BY: patient and daughter who lives in [facility]    Chief Complaint:   Chief Complaint   Patient presents with    TIA    Follow-up      Subjective:   Pt is a 80y. o. right handed female last seen in clinic on 5/25/21 in f/u for CAA, diagnosed after hospitalized 3/18/19 - 3/21/19, presenting to [facility] with aphasia, right sided weakness, s/p IV TPA with subsequent SAH and IPH. 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 6/29/20 MMSE score 29/30 unable to copy, slightly unsteady gait. Admitted to [facility] 2/4/21 - 2/5/21 again for word finding and right facial droop. MRI brain 2/5/21 was neg for acute stroke and pt was started on ASA 81mg daily. LDL was 99, started on Lipitor 40mg daily. Exam was stable, clear memory impairment to history, steady gait with walker. We reviewed the diagnosis of CAA and risk of cerebral hemorrhage. MRI brain was reviewed with pt and her daughter. Discussed association of dementia with CAA. Continued Aricept 10mg daily. Discussed the two TIAs with aphasia and right side weakness, and it seems benefit of ischemic stroke prevention with ASA 81mg daily outweighs the risk of hemorrhage. Encouraged to restart ASA 81mg daily. Discussed stroke risk factors and use of a statin to prevent stroke. Encouraged her to restart Lipitor, goal LDL <70. Discuss frequent naps, could be age related, but also due to boredom and/or depression, on fluoxetine 5mg daily, suggested she discuss increasing to 10mg daily with PCP. Pointed out to pt that she has a very steady gait with use of her walker, encouraged to use walker at all time. She returns for f/u. It is not clear what meds she is taking. Her son has been filling her pillbox once a week. She has pill bottles with her. ASA is not included. She recalls reading an article that ASA might not be good to take. She is walking daily outside.  She uses her walker 90% of the time, \"not as much as my children would like. \"  Her daughter has been visiting for the last week from [place]. She had one fall since her daughter arrived. Her daughter heard a crash, found Mom on floor just outside bathroom, fell back and hit head on bathroom floor. Could not get up. Did not have walker with her. Her daughter reports snoring and choking sounds and pt has been c/o feeling very exhausted, will go back to bed as soon as she has breakfast.  Additionally, c/o pain in legs that wakes her from sleep, has to move feet up and down and then can go back to sleep. Previous testing:  CT head 3/18/19 , CTA H/N - without acute findings, no LVO. S/p IV TPA, noted to have worsening aphasia, repeat head CT with SAH in right frontal and occipital lobe and left occipital IPH. MRI brain wo contrast 3/18/19 with stable hemorrhages, multiple foci of low signal on GRE c/w cerebral amyloid angiopathy, no DWI + lesions, mild CIWM disease. MRI brain wo contrast 7/16/20 was stable  MRI brain 2/5/21 was neg for acute stroke    Past Medical History:   Diagnosis Date    Cerebral amyloid angiopathy (HCC)     CVA (cerebral vascular accident) ([facility] [address] 03/2019    aphasia and right sided weakness, s/p IV TPA with subsequent SAH/IPH, found to have change c/w CAA    Hypothyroid       Past Surgical History:   Procedure Laterality Date    HX BACK SURGERY      HX HIP REPLACEMENT      right    HX HYSTERECTOMY      HX TONSILLECTOMY        Social History     Socioeconomic History    Marital status:      Spouse name: Not on file    Number of children: Not on file    Years of education: Not on file    Highest education level: Not on file   Occupational History    Not on file   Tobacco Use    Smoking status: Never Smoker    Smokeless tobacco: Never Used   Vaping Use    Vaping Use: Never used   Substance and Sexual Activity    Alcohol use:  Yes     Alcohol/week: 0.8 standard drinks     Types: 1 Glasses of wine per week     Comment: rarely    Drug use: No    Sexual activity: Not Currently   Other Topics Concern    Not on file   Social History Narrative    Not on file     Social Determinants of Health     Financial Resource Strain:     Difficulty of Paying Living Expenses: Not on file   Food Insecurity:     Worried About Running Out of Food in the Last Year: Not on file    Ame of Food in the Last Year: Not on file   Transportation Needs:     Lack of Transportation (Medical): Not on file    Lack of Transportation (Non-Medical): Not on file   Physical Activity:     Days of Exercise per Week: Not on file    Minutes of Exercise per Session: Not on file   Stress:     Feeling of Stress : Not on file   Social Connections:     Frequency of Communication with Friends and Family: Not on file    Frequency of Social Gatherings with Friends and Family: Not on file    Attends [employer] Services: Not on file    Active Member of [address] [place] [address] or Organizations: Not on file    Attends Club or Organization Meetings: Not on file    Marital Status: Not on file   Intimate Partner Violence:     Fear of Current or Ex-Partner: Not on file    Emotionally Abused: Not on file    Physically Abused: Not on file    Sexually Abused: Not on file   Housing Stability:     Unable to Pay for Housing in the Last Year: Not on file    Number of [place] in the Last Year: Not on file    Unstable Housing in the Last Year: Not on file     Family History   Problem Relation Age of Onset    Dementia Mother         Dec 89yo, had memory loss for the last few years. Objective:   Review of Systems   Constitutional: Negative. Eyes: Negative. Respiratory: Negative. Gastrointestinal: Negative. Genitourinary: Negative. Musculoskeletal: Negative. Skin: Negative. Endo/Heme/Allergies: Negative.         No Known Allergies     Meds:  Outpatient Medications Prior to Visit   Medication Sig Dispense Refill    donepeziL (ARICEPT) 10 mg tablet       ergocalciferol (ERGOCALCIFEROL) 1,250 mcg (50,000 unit) capsule       ondansetron (Zofran ODT) 4 mg disintegrating tablet Take 1 Tablet by mouth every eight (8) hours as needed for Nausea or Vomiting. 20 Tablet 0    aspirin 81 mg chewable tablet Take 1 Tab by mouth daily. 30 Tab 0    levothyroxine (SYNTHROID) 25 mcg tablet TAKE 1 TABLET IN THE MORNING BEFORE BREAKFAST 60 Tab 6    cycloSPORINE (RESTASIS) 0.05 % ophthalmic emulsion Administer 1 Drop to both eyes two (2) times a day.  brinzolamide (AZOPT) 1 % ophthalmic suspension Administer 1 Drop to right eye two (2) times a day.  brimonidine-timolol (COMBIGAN) 0.2-0.5 % Drop ophthalmic solution Administer 1 Drop to right eye every twelve (12) hours. No facility-administered medications prior to visit. Imaging:  MRI Results (most recent):  Results from Hospital Encounter encounter on 02/04/21    MRI BRAIN WO CONT    Narrative  EXAM:  MRI BRAIN WO CONT  INDICATION:  Rule out CVA  TECHNIQUE:  Sagittal T1, axial FLAIR, T2,T1 and gradient echo images as well as coronal T2  weighted images and axial diffusion weighted images of the head were obtained. COMPARISON:  CT, CTA 2/4/21  FINDINGS:  Generalized prominence ventricles and sulci consistent with volume loss most  prominent centrally and unchanged from prior exams. Multifocal and some confluent T2 hyperintensity in the cerebral white matter. Nonspecific pattern suggesting chronic small vessel ischemic change. Additionally, as producing noted there are numerous foci of punctate hemosiderin  deposition in the supratentorial area primarily peripheral in the cerebral  hemispheres with minimal involvement basal ganglia, however there is prominent  involvement of the right cerebellum. This is a nonspecific pattern more  suggestive of amyloid angiopathy than chronic small vessel ischemic change,  however may represent a mixed pattern.   There is no definitive abnormal restricted diffusion or other findings of acute  or subacute brain infarction. No evidence of acute intracranial hemorrhage, mass or abnormal extra-axial fluid  collections. Flow voids are present in vertebral basilar and carotid artery systems. Structures skull base including paranasal sinuses are unremarkable. Craniocervical junction demonstrates spondylosis, chronic degenerative changes  around the odontoid with mild stenosis and prior cervical fusion. Impression  1. Chronic age-related volume loss and white matter disease somewhat greater  than expected and similar to prior exams. 2. Again demonstrated are punctate foci hemosiderin suggesting possibility of a  component of amyloid angiopathy superimposed on other chronic small vessel  ischemic change. 3. No acute intracranial abnormality demonstrated. CT Results (most recent):     Results from Hospital Encounter encounter on 02/04/21   CT CODE NEURO PERF W CBF    Narrative *PRELIMINARY REPORT*    No acute large vessel occlusion. No perfusion abnormality. Preliminary report was provided by Dr. Cl Salvador, the on-call radiologist, at 2000  during downtime    Final report to follow. *END PRELIMINARY REPORT*    FINAL REPORT:    INDICATION: Code Stroke    EXAMINATION:  CT ANGIOGRAPHY HEAD AND NECK    COMPARISON: CT head earlier the same day, prior MRI/MRA July 2019    TECHNIQUE:  Following the uneventful administration of  intravenous contrast,  axial CT angiography of the head and neck was performed. 3D image  postprocessing was performed. CT dose reduction was achieved through use of a  standardized protocol tailored for this examination and automatic exposure  control for dose modulation. Cerebral perfusion analysis using computed tomography with contrast  administration, including post processing of parametric maps with the  termination of cerebral blood flow, cerebral blood volume, and mean transit  time.     FINDINGS:    CTA NECK    Aortic Arch: Patent. Right Common Carotid Artery: Patent. Right Internal Carotid Artery: Moderate calcific plaque, with mild stenosis at  the origin and proximal internal carotid artery. NASCET Right: Less than 25%  Left Common Carotid Artery: Mild calcific atherosclerosis at the origin without  stenosis. Left Internal Carotid Artery: Moderate calcific plaque along the undersurface of  the origin without significant stenosis. NASCET Left: 0-25%. Carotid stenosis determined using NASCET criteria. Right Vertebral Artery: Tortuous, patent, with severe stenosis at the origin. Left Vertebral Artery: Tortuous, patent. Cervical Soft Tissues: No significant abnormality. Lung Apices: Biapical pleural and parenchymal scarring. Bones: Degenerative changes throughout the cervical spine. Moderate narrowing at  the foramen magnum by degenerative soft tissue around the C1-2 junction. No  destructive bone lesion. Additional Comments: Extensive venous contrast particularly in the paraspinal  region of the cervical spine, as well as in the left internal jugular vein due  to narrowing of the brachiocephalic vein between the innominate artery and  sternum. No venous thrombosis. CTA HEAD    Posterior Circulation: Mild calcification right V4 vertebral artery segment, and  moderate calcification proximal V4 left vertebral artery segment. No stenosis. Basilar artery is patent. Right posterior cerebral artery supplied by posterior  communicating artery. Left posterior cerebral artery is patent. Anterior Circulation: Mild calcific atherosclerosis bilateral cavernous and  supraclinoid segments. Anterior and middle cerebral arteries are patent. Additional Comments: No evidence of aneurysm or vascular malformation. CT PERFUSION:  There are no regional areas of elevated Tmax, decreased cerebral blood flow or  blood volume. RCBF < 30% = 0 cc. Tmax > 6 seconds = 0 cc. Mismatch volume = 0 cc.       Impression No acute process. No large vessel occlusion, arterial dissection, or  flow-limiting stenosis. No perfusion abnormality. Atherosclerotic vascular  disease and left brachiocephalic vein narrowing as discussed in full detail  above. Reviewed records in [employer] and media tab today    Lab Review   Results for orders placed or performed during the hospital encounter of 06/04/21   COVID-19 RAPID TEST   Result Value Ref Range    Specimen source Nasopharyngeal      COVID-19 rapid test Not detected NOTD     METABOLIC PANEL, COMPREHENSIVE   Result Value Ref Range    Sodium 126 (L) 136 - 145 mmol/L    Potassium 3.6 3.5 - 5.1 mmol/L    Chloride 95 (L) 97 - 108 mmol/L    CO2 27 21 - 32 mmol/L    Anion gap 4 (L) 5 - 15 mmol/L    Glucose 104 (H) 65 - 100 mg/dL    BUN 9 6 - 20 MG/DL    Creatinine 0.51 (L) 0.55 - 1.02 MG/DL    BUN/Creatinine ratio 18 12 - 20      GFR est AA >60 >60 ml/min/1.73m2    GFR est non-AA >60 >60 ml/min/1.73m2    Calcium 8.2 (L) 8.5 - 10.1 MG/DL    Bilirubin, total 1.2 (H) 0.2 - 1.0 MG/DL    ALT (SGPT) 23 12 - 78 U/L    AST (SGOT) 36 15 - 37 U/L    Alk.  phosphatase 64 45 - 117 U/L    Protein, total 7.1 6.4 - 8.2 g/dL    Albumin 3.4 (L) 3.5 - 5.0 g/dL    Globulin 3.7 2.0 - 4.0 g/dL    A-G Ratio 0.9 (L) 1.1 - 2.2     LIPASE   Result Value Ref Range    Lipase >3,000 (H) 73 - 393 U/L   AMYLASE   Result Value Ref Range    Amylase 419 (H) 25 - 115 U/L   CBC WITH AUTOMATED DIFF   Result Value Ref Range    WBC 5.6 3.6 - 11.0 K/uL    RBC 3.91 3.80 - 5.20 M/uL    HGB 12.5 11.5 - 16.0 g/dL    HCT 36.4 35.0 - 47.0 %    MCV 93.1 80.0 - 99.0 FL    MCH 32.0 26.0 - 34.0 PG    MCHC 34.3 30.0 - 36.5 g/dL    RDW 11.6 11.5 - 14.5 %    PLATELET 595 065 - 822 K/uL    MPV 8.5 (L) 8.9 - 12.9 FL    NRBC 0.0 0  WBC    ABSOLUTE NRBC 0.00 0.00 - 0.01 K/uL    NEUTROPHILS 70 32 - 75 %    LYMPHOCYTES 16 12 - 49 %    MONOCYTES 11 5 - 13 %    EOSINOPHILS 2 0 - 7 %    BASOPHILS 1 0 - 1 %    IMMATURE GRANULOCYTES 0 0.0 - 0.5 %    ABS. NEUTROPHILS 3.9 1.8 - 8.0 K/UL    ABS. LYMPHOCYTES 0.9 0.8 - 3.5 K/UL    ABS. MONOCYTES 0.6 0.0 - 1.0 K/UL    ABS. EOSINOPHILS 0.1 0.0 - 0.4 K/UL    ABS. BASOPHILS 0.0 0.0 - 0.1 K/UL    ABS. IMM. GRANS. 0.0 0.00 - 0.04 K/UL    DF AUTOMATED     SARS-COV-2   Result Value Ref Range    SARS-CoV-2 Please find results under separate order     METABOLIC PANEL, COMPREHENSIVE   Result Value Ref Range    Sodium 130 (L) 136 - 145 mmol/L    Potassium 3.0 (L) 3.5 - 5.1 mmol/L    Chloride 99 97 - 108 mmol/L    CO2 23 21 - 32 mmol/L    Anion gap 8 5 - 15 mmol/L    Glucose 70 65 - 100 mg/dL    BUN 8 6 - 20 MG/DL    Creatinine 0.42 (L) 0.55 - 1.02 MG/DL    BUN/Creatinine ratio 19 12 - 20      GFR est AA >60 >60 ml/min/1.73m2    GFR est non-AA >60 >60 ml/min/1.73m2    Calcium 7.6 (L) 8.5 - 10.1 MG/DL    Bilirubin, total 1.4 (H) 0.2 - 1.0 MG/DL    ALT (SGPT) 19 12 - 78 U/L    AST (SGOT) 20 15 - 37 U/L    Alk. phosphatase 51 45 - 117 U/L    Protein, total 5.8 (L) 6.4 - 8.2 g/dL    Albumin 2.7 (L) 3.5 - 5.0 g/dL    Globulin 3.1 2.0 - 4.0 g/dL    A-G Ratio 0.9 (L) 1.1 - 2.2     CBC WITH AUTOMATED DIFF   Result Value Ref Range    WBC 3.8 3.6 - 11.0 K/uL    RBC 3.90 3.80 - 5.20 M/uL    HGB 12.4 11.5 - 16.0 g/dL    HCT 36.2 35.0 - 47.0 %    MCV 92.8 80.0 - 99.0 FL    MCH 31.8 26.0 - 34.0 PG    MCHC 34.3 30.0 - 36.5 g/dL    RDW 11.7 11.5 - 14.5 %    PLATELET 541 615 - 323 K/uL    MPV 8.7 (L) 8.9 - 12.9 FL    NRBC 0.0 0  WBC    ABSOLUTE NRBC 0.00 0.00 - 0.01 K/uL    NEUTROPHILS 55 32 - 75 %    LYMPHOCYTES 28 12 - 49 %    MONOCYTES 13 5 - 13 %    EOSINOPHILS 3 0 - 7 %    BASOPHILS 1 0 - 1 %    IMMATURE GRANULOCYTES 0 0.0 - 0.5 %    ABS. NEUTROPHILS 2.1 1.8 - 8.0 K/UL    ABS. LYMPHOCYTES 1.1 0.8 - 3.5 K/UL    ABS. MONOCYTES 0.5 0.0 - 1.0 K/UL    ABS. EOSINOPHILS 0.1 0.0 - 0.4 K/UL    ABS. BASOPHILS 0.0 0.0 - 0.1 K/UL    ABS. IMM.  GRANS. 0.0 0.00 - 0.04 K/UL    DF AUTOMATED     METABOLIC PANEL, COMPREHENSIVE   Result Value Ref Range    Sodium 137 136 - 145 mmol/L    Potassium 3.3 (L) 3.5 - 5.1 mmol/L    Chloride 106 97 - 108 mmol/L    CO2 27 21 - 32 mmol/L    Anion gap 4 (L) 5 - 15 mmol/L    Glucose 87 65 - 100 mg/dL    BUN 8 6 - 20 MG/DL    Creatinine 0.43 (L) 0.55 - 1.02 MG/DL    BUN/Creatinine ratio 19 12 - 20      GFR est AA >60 >60 ml/min/1.73m2    GFR est non-AA >60 >60 ml/min/1.73m2    Calcium 7.8 (L) 8.5 - 10.1 MG/DL    Bilirubin, total 0.7 0.2 - 1.0 MG/DL    ALT (SGPT) 17 12 - 78 U/L    AST (SGOT) 12 (L) 15 - 37 U/L    Alk.  phosphatase 52 45 - 117 U/L    Protein, total 5.6 (L) 6.4 - 8.2 g/dL    Albumin 2.7 (L) 3.5 - 5.0 g/dL    Globulin 2.9 2.0 - 4.0 g/dL    A-G Ratio 0.9 (L) 1.1 - 2.2     MAGNESIUM   Result Value Ref Range    Magnesium 2.1 1.6 - 2.4 mg/dL   PHOSPHORUS   Result Value Ref Range    Phosphorus 2.5 (L) 2.6 - 4.7 MG/DL   LIPASE   Result Value Ref Range    Lipase 117 73 - 393 U/L   URINALYSIS W/ REFLEX CULTURE    Specimen: Urine   Result Value Ref Range    Color YELLOW/STRAW      Appearance CLEAR CLEAR      Specific gravity 1.005 1.003 - 1.030      pH (UA) 7.0 5.0 - 8.0      Protein Negative NEG mg/dL    Glucose Negative NEG mg/dL    Ketone Negative NEG mg/dL    Bilirubin Negative NEG      Blood SMALL (A) NEG      Urobilinogen 1.0 0.2 - 1.0 EU/dL    Nitrites Negative NEG      Leukocyte Esterase TRACE (A) NEG      WBC 0-4 0 - 4 /hpf    RBC 10-20 0 - 5 /hpf    Epithelial cells FEW FEW /lpf    Bacteria Negative NEG /hpf    UA:UC IF INDICATED CULTURE NOT INDICATED BY UA RESULT CNI      Hyaline cast 0-2 0 - 5 /lpf   METABOLIC PANEL, COMPREHENSIVE   Result Value Ref Range    Sodium 139 136 - 145 mmol/L    Potassium 3.3 (L) 3.5 - 5.1 mmol/L    Chloride 107 97 - 108 mmol/L    CO2 28 21 - 32 mmol/L    Anion gap 4 (L) 5 - 15 mmol/L    Glucose 100 65 - 100 mg/dL    BUN 8 6 - 20 MG/DL    Creatinine 0.44 (L) 0.55 - 1.02 MG/DL    BUN/Creatinine ratio 18 12 - 20      GFR est AA >60 >60 ml/min/1.73m2    GFR est non-AA >60 >60 ml/min/1.73m2    Calcium 7.7 (L) 8.5 - 10.1 MG/DL    Bilirubin, total 0.5 0.2 - 1.0 MG/DL    ALT (SGPT) 17 12 - 78 U/L    AST (SGOT) 17 15 - 37 U/L    Alk. phosphatase 49 45 - 117 U/L    Protein, total 5.5 (L) 6.4 - 8.2 g/dL    Albumin 2.6 (L) 3.5 - 5.0 g/dL    Globulin 2.9 2.0 - 4.0 g/dL    A-G Ratio 0.9 (L) 1.1 - 2.2     MAGNESIUM   Result Value Ref Range    Magnesium 1.8 1.6 - 2.4 mg/dL   LIPASE   Result Value Ref Range    Lipase 356 73 - 393 U/L        Exam:  Visit Vitals  Ht 5' 3\" (1.6 m)   Wt 97 lb 12.8 oz (44.4 kg)   BMI 17.32 kg/m²     General:  Alert, cooperative, no distress. Head:  Normocephalic, without obvious abnormality, atraumatic. Respiratory:  Heart:   Non labored breathing  Regular rate and rhythm, no murmurs   Neck:      Extremities: Warm, no cyanosis or edema. Pulses: 2+ radial pulses. Neurologic:  MS: Alert, speech intact. Language-see MMSE. Will Lechuga Attention and fund of knowledge appropriate. Recent and remote memory impaired to portions of history, has good memory of certain details.    Cranial Nerves:  II: visual fields    II: pupils    II: optic disc    III,VII: ptosis none   III,IV,VI: extraocular muscles  EOMI, no nystagmus or diplopia   V: facial light touch sensation     VII: facial muscle function   symmetric   VIII: hearing intact   IX: soft palate elevation     XI: trapezius strength     XI: sternocleidomastoid strength    XII: tongue       Motor: normal bulk and tone, no tremor, Strength: 5/5 , bicep, HF, no PD  Sensory:   Coordination:  Gait: Unsteady gait   Reflexes:     Mini Mental State Exam 12/1/2021 6/29/2020   What is the Year 0 1   What is the Season 1 1   What is the Date 1 1   What is the Day 1 1   What is the Month 1 1   Where are we State 1 1   Where are we Country 1 1   Where are we [address] [place] [address] or [facility] 1 1   Where are we Floor 1 1   Name three objects, then ask the patient to say them 3 3   Serial sevens Subtract 7 from 100 in increments 5 5   Ask for the three objects repeated above 2 3   Name a pencil 1 1   Name a watch 1 1   Have the patient repeat this phrase \"No ifs, ands, or buts\" 1 1   Three stage command: Take the paper in your right hand 1 1   Fold the paper in half 1 1   Put the paper on the floor 1 1   Read and obey the following: CLOSE YOUR EYES 1 1   Have the patient write a sentence 1 1   Have the patient copy a figure 1 0   Mini Mental Score 28 29   Some recent data might be hidden          Assessment/Plan   Pt is a 89y. o. right handed female with CAA, diagnosed after hospitalized 3/18/19 - 3/21/19, presenting to [facility] with aphasia, right sided weakness, s/p IV TPA with subsequent SAH and IPH. 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 6/29/20 MMSE score 29/30 unable to copy, slightly unsteady gait. Admitted to [facility] 2/4/21 - 2/5/21 again for word finding and right facial droop. MRI brain 2/5/21 was neg for acute stroke and pt was started on ASA 81mg daily. LDL was 99, started on Lipitor 40mg daily. Exam remains stable, clear memory impairment to history, but MMSE 28/30, unsteady gait without walker. Continue Aricept 10mg daily. Unclear if she is taking ASA, again reviewed h/o two TIAs with aphasia and right side weakness, and it seems benefit of ischemic stroke prevention with ASA 81mg daily outweighs the risk of hemorrhage with CAA at this time. Encouraged to restart ASA 81mg daily if not taking currently. Discussed stroke risk factors and use of a statin to prevent stroke, with goal LDL <70, she does not appear to be taking a statin at this time. Discussed sleep, concerning for TRI, sleep referral placed. Leg movements are suggestive of RLS. Pt is not interested in taking medication at this time and would like to see what the sleep eval reveals. Pointed out to pt that she has a very steady gait with use of her walker, encouraged to use walker at all times to prevent falls.  Pt has limited insight. F/u in clinic in 6 months, instructed to call in the interim if needed. ICD-10-CM ICD-9-CM    1. Cerebral amyloid angiopathy (CODE)  I68.0 277.39      437.9    2. TIA (transient ischemic attack)  G45.9 435.9    3. Memory loss  R41.3 780.93    4. Poor sleep  Z72.820 V69.4 SLEEP MEDICINE REFERRAL   5. Snoring  R06.83 786.09 SLEEP MEDICINE REFERRAL   6. Witnessed episode of apnea  R06.81 786.03 SLEEP MEDICINE REFERRAL       Signed:   Maritza Sen MD  12/1/2021

## 2021-12-20 ENCOUNTER — OFFICE VISIT (OUTPATIENT)
Dept: SLEEP MEDICINE | Age: 86
End: 2021-12-20
Payer: MEDICARE

## 2021-12-20 VITALS
HEIGHT: 63 IN | HEART RATE: 71 BPM | OXYGEN SATURATION: 97 % | WEIGHT: 97 LBS | BODY MASS INDEX: 17.19 KG/M2 | SYSTOLIC BLOOD PRESSURE: 136 MMHG | RESPIRATION RATE: 18 BRPM | DIASTOLIC BLOOD PRESSURE: 51 MMHG

## 2021-12-20 DIAGNOSIS — G45.9 TIA (TRANSIENT ISCHEMIC ATTACK): ICD-10-CM

## 2021-12-20 DIAGNOSIS — G47.33 OSA (OBSTRUCTIVE SLEEP APNEA): Primary | ICD-10-CM

## 2021-12-20 PROCEDURE — G8419 CALC BMI OUT NRM PARAM NOF/U: HCPCS | Performed by: INTERNAL MEDICINE

## 2021-12-20 PROCEDURE — G8536 NO DOC ELDER MAL SCRN: HCPCS | Performed by: INTERNAL MEDICINE

## 2021-12-20 PROCEDURE — 1090F PRES/ABSN URINE INCON ASSESS: CPT | Performed by: INTERNAL MEDICINE

## 2021-12-20 PROCEDURE — G8432 DEP SCR NOT DOC, RNG: HCPCS | Performed by: INTERNAL MEDICINE

## 2021-12-20 PROCEDURE — 1101F PT FALLS ASSESS-DOCD LE1/YR: CPT | Performed by: INTERNAL MEDICINE

## 2021-12-20 PROCEDURE — G8427 DOCREV CUR MEDS BY ELIG CLIN: HCPCS | Performed by: INTERNAL MEDICINE

## 2021-12-20 PROCEDURE — 99204 OFFICE O/P NEW MOD 45 MIN: CPT | Performed by: INTERNAL MEDICINE

## 2021-12-20 NOTE — PROGRESS NOTES
[address] [place]. [place], [address]  Tel.  [phone]  Fax. 100 [facility] 60  [place], [address] [facility]  Tel.  [phone]  Fax. [phone] [address] [facility] Jeremiah De Leon  Tel.  [phone]  Fax. Lexx is a 80y.o. year old female seen for evaluation of a sleep disorder. ASSESSMENT/PLAN:      ICD-10-CM ICD-9-CM    1. TRI (obstructive sleep apnea)  G47.33 327.23 POLYSOMNOGRAPHY 1 NIGHT   2. TIA (transient ischemic attack)  G45.9 435.9        Patient has a history and examination consistent with the diagnosis of sleep apnea. Follow-up and Dispositions    · Return for telephone follow-up after testing is completed. * The patient currently has a Moderate Risk for having sleep apnea. STOP-BANG score 4.    * Sleep testing was ordered for initial evaluation. Orders Placed This Encounter    POLYSOMNOGRAPHY 1 NIGHT     Standing Status:   Future     Standing Expiration Date:   6/20/2022     Order Specific Question:   Reason for Exam     Answer:   TRI       * She was provided information on sleep apnea including corresponding risk factors and the importance of proper treatment. * Treatment options were reviewed in detail. she would like to proceed with PAP therapy. Patient will be seen in follow-up in 6-8 weeks after PAP setup to gauge treatment response and adherence to therapy. * The patient was counseled regarding proper sleep hygiene, with emphasis on ensuring sufficient total sleep time; safe driving and the benefits of exercise and weight loss. * All of her questions were addressed. SUBJECTIVE/OBJECTIVE:    Jocelynn Lewis is an 80 y.o. female referred for evaluation for a sleep disorder. She complains of snoring associated with periods of not breathing, excessive daytime sleepiness. Symptoms began 6 months ago, unchanged since that time. She usually can fall asleep in <5 minutes.   Family or house members note snoring, periods of not breathing. She denies of symptoms indicative of cataplexy, sleep paralysis or sleep related hallucinations. She reports of a history of heel pain that resolves with rubbing or hitting occurring during sleep. Eileen Racer does wake up frequently at night. She is not bothered by waking up too early and left unable to get back to sleep. She actually sleeps about 6 hours at night and wakes up about 3 times during the night. She does not work shifts:  . Brandy Oropeza indicates she does get too little sleep at night. Her bedtime is 1200. She awakens at 0700. She does take naps. She takes 7 naps a week lasting 30 to 45 minutes. She has the following observed behaviors: Loud snoring,Pauses in breathing,Sleep talking,Bedwetting;  . Other remarks:  (waking up with gasps)    The patient has not undergone diagnostic testing for the current problems. Review of Systems:  Constitutional:  No significant weight loss or weight gain  Eyes:  No blurred vision  CVS:  No significant chest pain  Pulm:  No significant shortness of breath  GI:  No significant nausea or vomiting  :  significant nocturia  Musculoskeletal:  No significant joint pain at night  Skin:  No significant rashes  Neuro:  No significant dizziness   Psych:  No active mood issues    Sleep Review of Systems: notable for Negative difficulty falling asleep; Positive awakenings at night; Positive perceived regular dreaming; Negative nightmares; Negative  early morning headaches; Positive  memory problems; Negative  concentration issues; Negative caffeine;  Negative alcohol;   Negative history of any automobile or occupational accidents due to daytime drowsiness. [place] Sleepiness Score: 18   and Modified F.O.S.Q. Score Total / 2: 18.5    Visit Vitals  BP (!) 136/51   Pulse 71   Resp 18   Ht 5' 3\" (1.6 m)   Wt 97 lb (44 kg)   SpO2 97%   BMI 17.18 kg/m²    Neck circ.  in \"inches\": 14      General:   Alert, oriented, not in acute distress   Eyes:  Anicteric Sclerae; intact EOM's   Nose:  No obvious nasal septum deviation    Oropharynx:   Mallampati score 4, thick tongue base, uvula not seen due to low-lying soft palate, narrow tonsilo-pharyngeal pilars, tongue scalloped   Neck:   midline trachea,  no JVD   Chest/Lungs:  symmetrical lung expansion ,clear lung fields on auscultation    CVS:  Normal rate, regular rhythm    Extremities:  No obvious rashes, absent edema    Neuro:  No focal deficits; No obvious tremor    Psych:  Normal eye contact; normal  affect, normal countenance      Patient's phone number [phone] (home)  was reviewed and confirmed for accuracy. She gives permission for messages regarding results and appointments to be left at that number. Wilfredo Garcias MD, FAASM  Diplomate American Board of Sleep Medicine  Diplomate in Sleep Medicine - ABP    Electronically signed.  12/20/21

## 2021-12-20 NOTE — PATIENT INSTRUCTIONS
[address] [place]. [place], [address]  Tel.  [phone]  Fax. 100 [facility] 60  [place], [address] [facility]  Tel.  [phone]  Fax. [phone] [address] [facility] Jeremiah Burroughs  Tel.  [phone]  Fax. [phone]     Sleep Apnea: After Your Visit  Your Care Instructions  Sleep apnea occurs when you frequently stop breathing for 10 seconds or longer during sleep. It can be mild to severe, based on the number of times per hour that you stop breathing or have slowed breathing. Blocked or narrowed airways in your nose, mouth, or throat can cause sleep apnea. Your airway can become blocked when your throat muscles and tongue relax during sleep. Sleep apnea is common, occurring in 1 out of 20 individuals. Individuals having any of the following characteristics should be evaluated and treated right away due to high risk and detrimental consequences from untreated sleep apnea:  1. Obesity  2. Congestive Heart failure  3. Atrial Fibrillation  4. Uncontrolled Hypertension  5. Type II Diabetes  6. Night-time Arrhythmias  7. Stroke  8. Pulmonary Hypertension  9. High-risk Driving Populations (pilots, truck drivers, etc.)  10. Patients Considering Weight-loss Surgery    How do you know you have sleep apnea? You probably have sleep apnea if you answer 'yes' to 3 or more of the following questions:  S - Have you been told that you Snore? T - Are you often Tired during the day? O - Has anyone Observed you stop breathing while sleeping? P- Do you have (or are being treated for) high blood Pressure? B - Are you obese (Body Mass Index > 35)? A - Is your Age 48years old or older? N - Is your Neck size greater than 16 inches? G - Are you male Gender? A sleep physician can prescribe a breathing device that prevents tissues in the throat from blocking your airway.  Or your doctor may recommend using a dental device (oral breathing device) to help keep your airway open. In some cases, surgery may be needed to remove enlarged tissues in the throat. Follow-up care is a key part of your treatment and safety. Be sure to make and go to all appointments, and call your doctor if you are having problems. It's also a good idea to know your test results and keep a list of the medicines you take. How can you care for yourself at home? · Lose weight, if needed. It may reduce the number of times you stop breathing or have slowed breathing. · Go to bed at the same time every night. · Sleep on your side. It may stop mild apnea. If you tend to roll onto your back, sew a pocket in the back of your pajama top. Put a tennis ball into the pocket, and stitch the pocket shut. This will help keep you from sleeping on your back. · Avoid alcohol and medicines such as sleeping pills and sedatives before bed. · Do not smoke. Smoking can make sleep apnea worse. If you need help quitting, talk to your doctor about stop-smoking programs and medicines. These can increase your chances of quitting for good. · Prop up the head of your bed 4 to 6 inches by putting bricks under the legs of the bed. · Treat breathing problems, such as a stuffy nose, caused by a cold or allergies. · Use a continuous positive airway pressure (CPAP) breathing machine if lifestyle changes do not help your apnea and your doctor recommends it. The machine keeps your airway from closing when you sleep. · If CPAP does not help you, ask your doctor whether you should try other breathing machines. A bilevel positive airway pressure machine has two types of air pressureâone for breathing in and one for breathing out. Another device raises or lowers air pressure as needed while you breathe. · If your nose feels dry or bleeds when using one of these machines, talk with your doctor about increasing moisture in the air. A humidifier may help.   · If your nose is runny or stuffy from using a breathing machine, talk with your doctor about using decongestants or a corticosteroid nasal spray. When should you call for help? Watch closely for changes in your health, and be sure to contact your doctor if:  · You still have sleep apnea even though you have made lifestyle changes. · You are thinking of trying a device such as CPAP. · You are having problems using a CPAP or similar machine. Where can you learn more? Go to two.42.solutions. Enter K383 in the search box to learn more about \"Sleep Apnea: After Your Visit. \"   © 2706-4046 Healthwise, Incorporated. Care instructions adapted under license by [facility] (which disclaims liability or warranty for this information). This care instruction is for use with your licensed healthcare professional. If you have questions about a medical condition or this instruction, always ask your healthcare professional. Sara Baileys any warranty or liability for your use of this information. PROPER SLEEP HYGIENE    What to avoid  · Do not have drinks with caffeine, such as coffee or black tea, for 8 hours before bed. · Do not smoke or use other types of tobacco near bedtime. Nicotine is a stimulant and can keep you awake. · Avoid drinking alcohol late in the evening, because it can cause you to wake in the middle of the night. · Do not eat a big meal close to bedtime. If you are hungry, eat a light snack. · Do not drink a lot of water close to bedtime, because the need to urinate may wake you up during the night. · Do not read or watch TV in bed. Use the bed only for sleeping and sexual activity. What to try  · Go to bed at the same time every night, and wake up at the same time every morning. Do not take naps during the day. · Keep your bedroom quiet, dark, and cool. · Get regular exercise, but not within 3 to 4 hours of your bedtime. .  · Sleep on a comfortable pillow and mattress.   · If watching the clock makes you anxious, turn it facing away from you so you cannot see the time. · If you worry when you lie down, start a worry book. Well before bedtime, write down your worries, and then set the book and your concerns aside. · Try meditation or other relaxation techniques before you go to bed. · If you cannot fall asleep, get up and go to another room until you feel sleepy. Do something relaxing. Repeat your bedtime routine before you go to bed again. · Make your house quiet and calm about an hour before bedtime. Turn down the lights, turn off the TV, log off the computer, and turn down the volume on music. This can help you relax after a busy day. Drowsy Driving  The [address] [place] [address] Road Traffic Safety Administration cites drowsiness as a causing factor in more than 838,560 police reported crashes annually, resulting in 76,000 injuries and 1,500 deaths. Other surveys suggest 55% of people polled have driven while drowsy in the past year, 23% had fallen asleep but not crashed, 3% crashed, and 2% had and accident due to drowsy driving. Who is at risk? Young Drivers: One study of drowsy driving accidents states that 55% of the drivers were under 25 years. Of those, 75% were male. Shift Workers and Travelers: People who work overnight or travel across time zones frequently are at higher risk of experiencing Circadian Rhythm Disorders. They are trying to work and function when their body is programed to sleep. Sleep Deprived: Lack of sleep has a serious impact on your ability to pay attention or focus on a task. Consistently getting less than the average of 8 hours your body needs creates partial or cumulative sleep deprivation. Untreated Sleep Disorders: Sleep Apnea, Narcolepsy, R.L.S., and other sleep disorders (untreated) prevent a person from getting enough restful sleep. This leads to excessive daytime sleepiness and increases the risk for drowsy driving accidents by up to 7 times.   Medications / Alcohol: Even over the counter medications can cause drowsiness. Medications that impair a drivers attention should have a warning label. Alcohol naturally makes you sleepy and on its own can cause accidents. Combined with excessive drowsiness its effects are amplified. Signs of Drowsy Driving:   * You don't remember driving the last few miles   * You may drift out of your wilian   * You are unable to focus and your thoughts wander   * You may yawn more often than normal   * You have difficulty keeping your eyes open / nodding off   * Missing traffic signs, speeding, or tailgating  Prevention-   Good sleep hygiene, lifestyle and behavioral choices have the most impact on drowsy driving. There is no substitute for sleep and the average person requires 8 hours nightly. If you find yourself driving drowsy, stop and sleep. Consider the sleep hygiene tips provided during your visit as well. Medication Refill Policy: Refills for all medications require 1 week advance notice. Please have your pharmacy fax a refill request. We are unable to fax, or call in \"controled substance\" medications and you will need to pick these prescriptions up from our office. [employer] Activation    Thank you for requesting access to [employer]. Please follow the instructions below to securely access and download your online medical record. [employer] allows you to send messages to your doctor, view your test results, renew your prescriptions, schedule appointments, and more. How Do I Sign Up? 1. In your internet browser, go to [email or web address]. 2. Click on the First Time User? Click Here link in the Sign In box. You will see the New Member Sign Up page. 3. Enter your [employer] Access Code exactly as it appears below. You will not need to use this code after youve completed the sign-up process. If you do not sign up before the expiration date, you must request a new code.     [employer] Access Code: [email or web address]  Expires: 1/14/2022  3:18 PM (This is the date your Undertone access code will )    4. Enter the last four digits of your Social Security Number (xxxx) and Date of Birth (mm/dd/yyyy) as indicated and click Submit. You will be taken to the next sign-up page. 5. Create a [email or web address] ID. This will be your Undertone login ID and cannot be changed, so think of one that is secure and easy to remember. 6. Create a Undertone password. You can change your password at any time. 7. Enter your Password Reset Question and Answer. This can be used at a later time if you forget your password. 8. Enter your e-mail address. You will receive e-mail notification when new information is available in [address] 9. Click Sign Up. You can now view and download portions of your medical record. 10. Click the Download Summary menu link to download a portable copy of your medical information. Additional Information    If you have questions, please call [phone]. Remember, Undertone is NOT to be used for urgent needs. For medical emergencies, dial 911.

## 2021-12-21 ENCOUNTER — HOSPITAL ENCOUNTER (OUTPATIENT)
Dept: SLEEP MEDICINE | Age: 86
Discharge: HOME | End: 2021-12-21
Attending: INTERNAL MEDICINE
Payer: MEDICARE

## 2021-12-21 VITALS
TEMPERATURE: 98 F | OXYGEN SATURATION: 96 % | HEART RATE: 69 BPM | DIASTOLIC BLOOD PRESSURE: 65 MMHG | SYSTOLIC BLOOD PRESSURE: 117 MMHG

## 2021-12-21 PROCEDURE — 95810 POLYSOM 6/> YRS 4/> PARAM: CPT | Performed by: INTERNAL MEDICINE

## 2021-12-22 NOTE — PROGRESS NOTES
[address] [facility] [place]. [place], [address]  Tel.  [phone]  Fax. 100 [facility] 60  [place], [address] [facility]  Tel.  [phone]  Fax. [phone] [address] Replaced by [facility] Jeremiah Arreaga   Tel.  [phone]  Fax. [phone]     Sleep Study Technical Notes        PRE-Test:  Shon Collet (: 7/3/1928) arrived in the lobby. Patient was greeted, temperature checked (95 ) and screening questions asked. The patient was taken to the Sleep Center and taken directly to his/her room. BP (117/65) and SaO2 (96) were taken. Weight per patient (97). Procedure explained to the patient and questions were answered. The patient expressed understanding of the procedure. Electrodes were applied without incident. The patient was placed in bed and the study was started. Acquisition Notes:  Concepcion Overall off: 2303     Respiratory events: hypopneas    ECG:  NSR   Snoring:  moderate      o Patient had caregiver in attendance:  Y  o Patient watched TV or on phone after lights out for ** hours  o Patient slept with positional therapy:  N  o Patient slept with head of bed elevated:  N   o Patient wore an oral appliance:  N  o Patient to bathroom 1 times     POST Test:   Patient was awakened. Electrodes were removed. The patient was discharged after answering the Post Questionnaire.  Equipment and room cleaned per infection control policy.

## 2021-12-30 ENCOUNTER — TELEPHONE (OUTPATIENT)
Dept: SLEEP MEDICINE | Age: 86
End: 2021-12-30

## 2021-12-30 DIAGNOSIS — G47.33 OSA (OBSTRUCTIVE SLEEP APNEA): Primary | ICD-10-CM

## 2021-12-30 DIAGNOSIS — Z11.52 ENCOUNTER FOR SCREENING FOR COVID-19: ICD-10-CM

## 2021-12-30 NOTE — TELEPHONE ENCOUNTER
Ilya Dietrich is to be contacted by lead sleep technologist regarding results of Sleep Testing which was indicative of an average AHI of 10.9 per hour with an SpO2 bryan of 84%, the duration of SpO2 <88% was 8.0 minutes. * A second polysomnogram has been ordered for mask fitting, PAP desensitizing protocol, and pressure titration. Patient should be educated on the risks versus benefits of this elective sleep testing procedure being done during the pandemic and their consent be obtained. * Follow-up appointment will be scheduled 8-12 weeks following PAP initiation to gauge treatment response and compliance. Encounter Diagnoses   Name Primary?  TRI (obstructive sleep apnea) Yes    Encounter for screening for COVID-19        Orders Placed This Encounter    NOVEL CORONAVIRUS (COVID-19)     Standing Status:   Future     Number of Occurrences:   1     Standing Expiration Date:   3/30/2022     Scheduling Instructions:      1) Due to current limited availability of the COVID-19 PCR test, tests will be prioritized and may not be completed.              2) Order only if the test result will change clinical management or necessary for a return to mission-critical employment decision.              3) Print and instruct patient to adhere to [facility] home isolation program. (Link Above)              4) Set up or refer patient for a monitoring program.              5) Have patient sign up for and leverage [facility] (if not previously done).      Order Specific Question:   Status     Answer:   Asymptomatic/Surveillance(e.g. pre-op/pre-procedure/pre-delivery/transfer)     Order Specific Question:   Reason for Test     Answer:   Upcoming elective surgery/procedure/delivery, return to work, or discharge to another facility    SLEEP LAB (PAP TITRATION)     Standing Status:   Future     Standing Expiration Date:   6/30/2022     Order Specific Question:   Reason for Exam     Answer:   TRI

## 2022-01-21 ENCOUNTER — TELEPHONE (OUTPATIENT)
Dept: SLEEP MEDICINE | Age: 87
End: 2022-01-21

## 2022-01-21 NOTE — TELEPHONE ENCOUNTER
Patient's daughter ,Zi Hayden, called in stating we can call her for results. She is now on the [facility]. She can be reached at [phone].

## 2022-01-25 NOTE — TELEPHONE ENCOUNTER
Patient's daughter, Shaila Downs, called in requesting results for her mother. She can be reached at [phone].

## 2022-01-27 ENCOUNTER — TELEPHONE (OUTPATIENT)
Dept: SLEEP MEDICINE | Age: 87
End: 2022-01-27

## 2022-01-27 NOTE — TELEPHONE ENCOUNTER
LVM for daughter, Gabriel Zheng, to call back and schedule patient's sleep study. I scheduled for 1/28 and will hold the slot for now until she calls back.

## 2022-01-27 NOTE — TELEPHONE ENCOUNTER
Reviewed sleep study results with patient. She expressed understanding and is willing to proceed with a CPAP Titration. Study must be scheduled before 2/8/22 as daughter will not be available to accompany patient to study after that date.

## 2022-01-28 ENCOUNTER — HOSPITAL ENCOUNTER (OUTPATIENT)
Dept: SLEEP MEDICINE | Age: 87
Discharge: HOME | End: 2022-01-28
Payer: MEDICARE

## 2022-01-28 PROCEDURE — 95811 POLYSOM 6/>YRS CPAP 4/> PARM: CPT | Performed by: INTERNAL MEDICINE

## 2022-01-29 ENCOUNTER — DOCUMENTATION ONLY (OUTPATIENT)
Dept: SLEEP MEDICINE | Age: 87
End: 2022-01-29

## 2022-01-29 VITALS
WEIGHT: 97 LBS | BODY MASS INDEX: 17.19 KG/M2 | HEART RATE: 80 BPM | SYSTOLIC BLOOD PRESSURE: 105 MMHG | TEMPERATURE: 96.6 F | HEIGHT: 63 IN | OXYGEN SATURATION: 96 % | DIASTOLIC BLOOD PRESSURE: 58 MMHG

## 2022-01-29 NOTE — PROGRESS NOTES
[address] [facility] [place]. [place], [address]  Tel.  [phone]  Fax. [address] [facility]  [place], [address] [facility]  Tel.  [phone]  Fax. [phone] [address] [place]  Tel.  [phone]  Fax. [phone]     Sleep Study Technical Notes        PRE-Test:  Arleth Crum (: 7/3/1928) arrived in the lobby. Patient was greeted, temperature checked (96.6 ) and screening questions asked. The patient was taken to the Sleep Center and taken directly to his/her room. BP (105/58) and SaO2 (96) were taken. Weight per patient (97). Procedure explained to the patient and questions were answered. The patient expressed understanding of the procedure. Electrodes were applied without incident. The patient was placed in bed and the study was started.  PAP mask acclimation for **min. Patient diddid/didn't tolerate mask. Acquisition Notes:   Lights off: 11:13pm     Respiratory events: Few Hypopnea   ECG:  NSR   Snoring:  N/A mild/moderate/severe   PAP titration: Cpap start 5cm final pressure 10 cm   Desensitization Mask(s) Used: Small F&P Vitera   Other comments: Patient slept supine, sleep stages 1, 2, 3 and rem noted. Average Heart Rate during sleep 54.7 BPM. Min SpO2 94%  o Patient had caregiver in attendance:  YY/N  o Patient watched TV or on phone after lights out for ** hours  o Patient slept with positional therapy:  nY/N  o Patient slept with head of bed elevated:  n  o Patient wore an oral appliance:  nY/N  o Patient to bathroom 0 times  o Pediatric Patient:  - Parent accompanied patient: nY/N  - Parent slept in bed with patient: n Y/N      POST Test:   Patient was awakened. Electrodes were removed. The patient was discharged after answering the Post Questionnaire. Patient stated that N/Ahe/she was alert and ok to drive.  Equipment and room cleaned per infection control policy.

## 2022-02-01 ENCOUNTER — TELEPHONE (OUTPATIENT)
Dept: SLEEP MEDICINE | Age: 87
End: 2022-02-01

## 2022-02-01 DIAGNOSIS — G47.33 OSA (OBSTRUCTIVE SLEEP APNEA): Primary | ICD-10-CM

## 2022-02-01 NOTE — TELEPHONE ENCOUNTER
Orders Placed This Encounter    AMB SUPPLY ORDER     Diagnosis: Obstructive Sleep Apnea ICD-10 Code (G47.33)    Positive Airway Pressure Therapy: Duration of need: 99 months. APAP Device with Heated Humidifer [id number] / [id number]. Minimum Pressure: 06 cmH2O, Maximum Pressure: 09 cmH2O.     Nasal Pillows Combo Mask (Replace) 2 per month.  Nasal Pillows (Replace) 2 per month.  Full Face Mask 1 every 3 months.  Full Face Mask Cushion 1 per month.  Nasal Cushion (Replace) 2 per month.  Nasal Interface Mask 1 every 3 months.  Headgear 1 every 6 months.  Chinstrap 1 every 6 months.  Tubing 1 every 3 months.  Filter(s) Disposable 2 per month.  Filter(s) Non-Disposable 1 every 6 months. .   [address] for Humidifier (Replace) 1 every 6 months. Perform Mask Fitting per patient preference and comfort - replace as above. Donaciano Favre, MD, FAASM; NPI: [id number]  Electronically signed. 02/01/22

## 2022-02-04 ENCOUNTER — TELEPHONE (OUTPATIENT)
Dept: SLEEP MEDICINE | Age: 87
End: 2022-02-04

## 2022-02-05 NOTE — TELEPHONE ENCOUNTER
I was calling patient to inform about APAP order. Patient said she did not sleep well during titration sleep study.   She wants Dr to call her first.

## 2022-02-07 NOTE — TELEPHONE ENCOUNTER
Spoke with Kate Cuellar (patient's daughter) who has patient's permission to receive PHI. Updated her of study results, treatment options including, PAP therapy, UAS and OAT review, patient's daughter feels that her mother at 80years of age living alone will not be able to comply with either of these therapies. She did agree to proceeding with positional therapy using a commercially available positioning device.

## 2022-03-18 PROBLEM — R51.9 HEADACHE: Status: ACTIVE | Noted: 2019-07-08

## 2022-03-18 PROBLEM — M48.02 CERVICAL STENOSIS OF SPINE: Status: ACTIVE | Noted: 2019-07-09

## 2022-03-18 PROBLEM — E03.9 ACQUIRED HYPOTHYROIDISM: Status: ACTIVE | Noted: 2019-03-17

## 2022-03-18 PROBLEM — R29.818 ACUTE FOCAL NEUROLOGICAL DEFICIT: Status: ACTIVE | Noted: 2021-02-05

## 2022-03-18 PROBLEM — K85.90 ACUTE PANCREATITIS: Status: ACTIVE | Noted: 2021-06-02

## 2022-03-19 PROBLEM — R47.01 EXPRESSIVE APHASIA: Status: ACTIVE | Noted: 2019-07-09

## 2022-03-19 PROBLEM — G45.9 TIA (TRANSIENT ISCHEMIC ATTACK): Status: ACTIVE | Noted: 2021-02-04

## 2022-03-20 PROBLEM — E85.4 CEREBRAL AMYLOID ANGIOPATHY (HCC): Status: ACTIVE | Noted: 2019-07-08

## 2022-03-20 PROBLEM — I68.0 CEREBRAL AMYLOID ANGIOPATHY (HCC): Status: ACTIVE | Noted: 2019-07-08

## 2022-05-26 ENCOUNTER — OFFICE VISIT (OUTPATIENT)
Dept: NEUROLOGY | Age: 87
End: 2022-05-26
Payer: MEDICARE

## 2022-05-26 VITALS
SYSTOLIC BLOOD PRESSURE: 134 MMHG | TEMPERATURE: 97.6 F | HEART RATE: 80 BPM | DIASTOLIC BLOOD PRESSURE: 60 MMHG | RESPIRATION RATE: 16 BRPM | OXYGEN SATURATION: 98 %

## 2022-05-26 DIAGNOSIS — R41.3 MEMORY LOSS: ICD-10-CM

## 2022-05-26 DIAGNOSIS — I68.0 CEREBRAL AMYLOID ANGIOPATHY (CODE): Primary | ICD-10-CM

## 2022-05-26 DIAGNOSIS — G45.9 TIA (TRANSIENT ISCHEMIC ATTACK): ICD-10-CM

## 2022-05-26 PROCEDURE — 1123F ACP DISCUSS/DSCN MKR DOCD: CPT | Performed by: PSYCHIATRY & NEUROLOGY

## 2022-05-26 PROCEDURE — 99213 OFFICE O/P EST LOW 20 MIN: CPT | Performed by: PSYCHIATRY & NEUROLOGY

## 2022-05-26 PROCEDURE — G8536 NO DOC ELDER MAL SCRN: HCPCS | Performed by: PSYCHIATRY & NEUROLOGY

## 2022-05-26 PROCEDURE — 1090F PRES/ABSN URINE INCON ASSESS: CPT | Performed by: PSYCHIATRY & NEUROLOGY

## 2022-05-26 PROCEDURE — G8427 DOCREV CUR MEDS BY ELIG CLIN: HCPCS | Performed by: PSYCHIATRY & NEUROLOGY

## 2022-05-26 PROCEDURE — 1101F PT FALLS ASSESS-DOCD LE1/YR: CPT | Performed by: PSYCHIATRY & NEUROLOGY

## 2022-05-26 PROCEDURE — G8432 DEP SCR NOT DOC, RNG: HCPCS | Performed by: PSYCHIATRY & NEUROLOGY

## 2022-05-26 PROCEDURE — G8419 CALC BMI OUT NRM PARAM NOF/U: HCPCS | Performed by: PSYCHIATRY & NEUROLOGY

## 2022-05-26 NOTE — PROGRESS NOTES
Chief Complaint   Patient presents with    Follow-up     6 month follow up TIA and memory loss; states balance is still an issue     Visit Vitals  /60 (BP 1 Location: Right arm, BP Patient Position: Sitting, BP Cuff Size: Adult)   Pulse 80   Temp 97.6 °F (36.4 °C) (Temporal)   Resp 16   SpO2 98%

## 2022-05-26 NOTE — LETTER
6/6/2022    Patient: Erma Dickson   YOB: 1928   Date of Visit: 5/26/2022     Mendez Barajas MD  [address]  Via Fax: [phone]    Dear Mendez Barajas MD,      Thank you for referring Ms. Rusty Sy to [facility] for evaluation. My notes for this consultation are attached. If you have questions, please do not hesitate to call me. I look forward to following your patient along with you.       Sincerely,    Nara Vilchis MD

## 2022-05-26 NOTE — PROGRESS NOTES
Neurology Consult Note      HISTORY PROVIDED BY: patient and son who lives in [facility]. Chief Complaint:   Chief Complaint   Patient presents with    Follow-up     6 month follow up TIA and memory loss; states balance is still an issue      Subjective:   Pt is a 89y. o. right handed female last seen in clinic on 12/1/21 in f/u, pt has CAA, diagnosed after hospitalized 3/18/19 - 3/21/19, presenting to [facility] with aphasia, right sided weakness, s/p IV TPA with subsequent SAH and IPH. 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 6/29/20 MMSE score 29/30 unable to copy, slightly unsteady gait. Admitted to [facility] 2/4/21 - 2/5/21 again for word finding and right facial droop. MRI brain 2/5/21 was neg for acute stroke and pt was started on ASA 81mg daily. LDL was 99, started on Lipitor 40mg daily. Exam remains stable, clear memory impairment to history, but MMSE 28/30, unsteady gait without walker. Continued Aricept 10mg daily. Unclear if she is taking ASA, again reviewed h/o two TIAs with aphasia and right side weakness, and it seems benefit of ischemic stroke prevention with ASA 81mg daily outweighs the risk of hemorrhage with CAA at this time. Encouraged to restart ASA 81mg daily if not taking currently. Discussed stroke risk factors and use of a statin to prevent stroke, with goal LDL <70, she does not appear to be taking a statin at this time. Discussed sleep, concerning for TRI, sleep referral placed. Leg movements are suggestive of RLS. Pt was not interested in taking medication at this time and would like to see what the sleep eval reveals. Pointed out to pt that she has a very steady gait with use of her walker, encouraged to use walker at all times to prevent falls. Pt has limited insight. She returns for f/u. She is not taking ASA. She has not had any falls, using walker mostly. Still taking Aricept 10mg daily.  She had PSG and has TRI with AHI 10.9, O2 sat down to 84%. APAP was ordered, but her daughter did not feel she would tolerate it and was going to have her try a positioning device. No new complaints. No new complaints. Previous testing:  CT head 3/18/19 , CTA H/N - without acute findings, no LVO. S/p IV TPA, noted to have worsening aphasia, repeat head CT with SAH in right frontal and occipital lobe and left occipital IPH. MRI brain wo contrast 3/18/19 with stable hemorrhages, multiple foci of low signal on GRE c/w cerebral amyloid angiopathy, no DWI + lesions, mild CIWM disease. MRI brain wo contrast 7/16/20 was stable  MRI brain 2/5/21 was neg for acute stroke    Past Medical History:   Diagnosis Date    Cerebral amyloid angiopathy (HCC)     CVA (cerebral vascular accident) ([facility] [address] 03/2019    aphasia and right sided weakness, s/p IV TPA with subsequent SAH/IPH, found to have change c/w CAA    Hypothyroid       Past Surgical History:   Procedure Laterality Date    HX BACK SURGERY      HX HIP REPLACEMENT      right    HX HYSTERECTOMY      HX TONSILLECTOMY        Social History     Socioeconomic History    Marital status:      Spouse name: Not on file    Number of children: Not on file    Years of education: Not on file    Highest education level: Not on file   Occupational History    Not on file   Tobacco Use    Smoking status: Never Smoker    Smokeless tobacco: Never Used   Vaping Use    Vaping Use: Never used   Substance and Sexual Activity    Alcohol use: Yes     Alcohol/week: 0.8 standard drinks     Types: 1 Glasses of wine per week     Comment: rarely    Drug use: No    Sexual activity: Not Currently   Other Topics Concern    Not on file   Social History Narrative    Living at [facility] in [address].       Social Determinants of Health     Financial Resource Strain:     Difficulty of Paying Living Expenses: Not on file   Food Insecurity:     Worried About [facility] in the Last Year: Not on file    Ran Out of Food in the Last Year: Not on file   Transportation Needs:     Lack of Transportation (Medical): Not on file    Lack of Transportation (Non-Medical): Not on file   Physical Activity:     Days of Exercise per Week: Not on file    Minutes of Exercise per Session: Not on file   Stress:     Feeling of Stress : Not on file   Social Connections:     Frequency of Communication with Friends and Family: Not on file    Frequency of Social Gatherings with Friends and Family: Not on file    Attends [employer] Services: Not on file    Active Member of [address] [place] [address] [employer] or Organizations: Not on file    Attends Club or Organization Meetings: Not on file    Marital Status: Not on file   Intimate Partner Violence:     Fear of Current or Ex-Partner: Not on file    Emotionally Abused: Not on file    Physically Abused: Not on file    Sexually Abused: Not on file   Housing Stability:     Unable to Pay for Housing in the Last Year: Not on file    Number of [place] in the Last Year: Not on file    Unstable Housing in the Last Year: Not on file     Family History   Problem Relation Age of Onset    Dementia Mother         Dec 89yo, had memory loss for the last few years. Objective:   Review of Systems   Constitutional: Negative. Eyes: Negative. Respiratory: Negative. Gastrointestinal: Negative. Genitourinary: Negative. Musculoskeletal: Negative. Skin: Negative. Endo/Heme/Allergies: Negative. No Known Allergies     Meds:  Outpatient Medications Prior to Visit   Medication Sig Dispense Refill    donepeziL (ARICEPT) 10 mg tablet       ergocalciferol (ERGOCALCIFEROL) 1,250 mcg (50,000 unit) capsule       FLUoxetine (PROzac) 10 mg tablet Take 10 mg by mouth daily. Take 1/2 tablet by mouth every day      cyanocobalamin (Vitamin B-12) 1,000 mcg tablet Take 5,000 mcg by mouth daily.  aspirin 81 mg chewable tablet Take 1 Tab by mouth daily.  30 Tab 0    levothyroxine (SYNTHROID) 25 mcg tablet TAKE 1 TABLET IN THE MORNING BEFORE BREAKFAST 60 Tab 6    ondansetron (Zofran ODT) 4 mg disintegrating tablet Take 1 Tablet by mouth every eight (8) hours as needed for Nausea or Vomiting. (Patient not taking: Reported on 12/1/2021) 20 Tablet 0    cycloSPORINE (RESTASIS) 0.05 % ophthalmic emulsion Administer 1 Drop to both eyes two (2) times a day. (Patient not taking: Reported on 12/1/2021)      brinzolamide (AZOPT) 1 % ophthalmic suspension Administer 1 Drop to right eye two (2) times a day. (Patient not taking: Reported on 12/1/2021)      brimonidine-timolol (COMBIGAN) 0.2-0.5 % Drop ophthalmic solution Administer 1 Drop to right eye every twelve (12) hours. (Patient not taking: Reported on 12/1/2021)       No facility-administered medications prior to visit. Imaging:  MRI Results (most recent):  Results from Hospital Encounter encounter on 02/04/21    MRI BRAIN WO CONT    Narrative  EXAM:  MRI BRAIN WO CONT  INDICATION:  Rule out CVA  TECHNIQUE:  Sagittal T1, axial FLAIR, T2,T1 and gradient echo images as well as coronal T2  weighted images and axial diffusion weighted images of the head were obtained. COMPARISON:  CT, CTA 2/4/21  FINDINGS:  Generalized prominence ventricles and sulci consistent with volume loss most  prominent centrally and unchanged from prior exams. Multifocal and some confluent T2 hyperintensity in the cerebral white matter. Nonspecific pattern suggesting chronic small vessel ischemic change. Additionally, as producing noted there are numerous foci of punctate hemosiderin  deposition in the supratentorial area primarily peripheral in the cerebral  hemispheres with minimal involvement basal ganglia, however there is prominent  involvement of the right cerebellum. This is a nonspecific pattern more  suggestive of amyloid angiopathy than chronic small vessel ischemic change,  however may represent a mixed pattern.   There is no definitive abnormal restricted diffusion or other findings of acute  or subacute brain infarction. No evidence of acute intracranial hemorrhage, mass or abnormal extra-axial fluid  collections. Flow voids are present in vertebral basilar and carotid artery systems. Structures skull base including paranasal sinuses are unremarkable. Craniocervical junction demonstrates spondylosis, chronic degenerative changes  around the odontoid with mild stenosis and prior cervical fusion. Impression  1. Chronic age-related volume loss and white matter disease somewhat greater  than expected and similar to prior exams. 2. Again demonstrated are punctate foci hemosiderin suggesting possibility of a  component of amyloid angiopathy superimposed on other chronic small vessel  ischemic change. 3. No acute intracranial abnormality demonstrated. CT Results (most recent):     Results from Hospital Encounter encounter on 02/04/21   CT CODE NEURO PERF W CBF    Narrative *PRELIMINARY REPORT*    No acute large vessel occlusion. No perfusion abnormality. Preliminary report was provided by Dr. John Sanchez, the on-call radiologist, at 2000  during downtime    Final report to follow. *END PRELIMINARY REPORT*    FINAL REPORT:    INDICATION: Code Stroke    EXAMINATION:  CT ANGIOGRAPHY HEAD AND NECK    COMPARISON: CT head earlier the same day, prior MRI/MRA July 2019    TECHNIQUE:  Following the uneventful administration of  intravenous contrast,  axial CT angiography of the head and neck was performed. 3D image  postprocessing was performed. CT dose reduction was achieved through use of a  standardized protocol tailored for this examination and automatic exposure  control for dose modulation. Cerebral perfusion analysis using computed tomography with contrast  administration, including post processing of parametric maps with the  termination of cerebral blood flow, cerebral blood volume, and mean transit  time.     FINDINGS:    CTA NECK    Aortic Arch: Patent. Right Common Carotid Artery: Patent. Right Internal Carotid Artery: Moderate calcific plaque, with mild stenosis at  the origin and proximal internal carotid artery. NASCET Right: Less than 25%  Left Common Carotid Artery: Mild calcific atherosclerosis at the origin without  stenosis. Left Internal Carotid Artery: Moderate calcific plaque along the undersurface of  the origin without significant stenosis. NASCET Left: 0-25%. Carotid stenosis determined using NASCET criteria. Right Vertebral Artery: Tortuous, patent, with severe stenosis at the origin. Left Vertebral Artery: Tortuous, patent. Cervical Soft Tissues: No significant abnormality. Lung Apices: Biapical pleural and parenchymal scarring. Bones: Degenerative changes throughout the cervical spine. Moderate narrowing at  the foramen magnum by degenerative soft tissue around the C1-2 junction. No  destructive bone lesion. Additional Comments: Extensive venous contrast particularly in the paraspinal  region of the cervical spine, as well as in the left internal jugular vein due  to narrowing of the brachiocephalic vein between the innominate artery and  sternum. No venous thrombosis. CTA HEAD    Posterior Circulation: Mild calcification right V4 vertebral artery segment, and  moderate calcification proximal V4 left vertebral artery segment. No stenosis. Basilar artery is patent. Right posterior cerebral artery supplied by posterior  communicating artery. Left posterior cerebral artery is patent. Anterior Circulation: Mild calcific atherosclerosis bilateral cavernous and  supraclinoid segments. Anterior and middle cerebral arteries are patent. Additional Comments: No evidence of aneurysm or vascular malformation. CT PERFUSION:  There are no regional areas of elevated Tmax, decreased cerebral blood flow or  blood volume. RCBF < 30% = 0 cc. Tmax > 6 seconds = 0 cc. Mismatch volume = 0 cc.       Impression No acute process. No large vessel occlusion, arterial dissection, or  flow-limiting stenosis. No perfusion abnormality. Atherosclerotic vascular  disease and left brachiocephalic vein narrowing as discussed in full detail  above. Reviewed records in [facility] and media tab today    Lab Review   Results for orders placed or performed during the hospital encounter of 06/04/21   COVID-19 RAPID TEST   Result Value Ref Range    Specimen source Nasopharyngeal      COVID-19 rapid test Not detected NOTD     METABOLIC PANEL, COMPREHENSIVE   Result Value Ref Range    Sodium 126 (L) 136 - 145 mmol/L    Potassium 3.6 3.5 - 5.1 mmol/L    Chloride 95 (L) 97 - 108 mmol/L    CO2 27 21 - 32 mmol/L    Anion gap 4 (L) 5 - 15 mmol/L    Glucose 104 (H) 65 - 100 mg/dL    BUN 9 6 - 20 MG/DL    Creatinine 0.51 (L) 0.55 - 1.02 MG/DL    BUN/Creatinine ratio 18 12 - 20      GFR est AA >60 >60 ml/min/1.73m2    GFR est non-AA >60 >60 ml/min/1.73m2    Calcium 8.2 (L) 8.5 - 10.1 MG/DL    Bilirubin, total 1.2 (H) 0.2 - 1.0 MG/DL    ALT (SGPT) 23 12 - 78 U/L    AST (SGOT) 36 15 - 37 U/L    Alk.  phosphatase 64 45 - 117 U/L    Protein, total 7.1 6.4 - 8.2 g/dL    Albumin 3.4 (L) 3.5 - 5.0 g/dL    Globulin 3.7 2.0 - 4.0 g/dL    A-G Ratio 0.9 (L) 1.1 - 2.2     LIPASE   Result Value Ref Range    Lipase >3,000 (H) 73 - 393 U/L   AMYLASE   Result Value Ref Range    Amylase 419 (H) 25 - 115 U/L   CBC WITH AUTOMATED DIFF   Result Value Ref Range    WBC 5.6 3.6 - 11.0 K/uL    RBC 3.91 3.80 - 5.20 M/uL    HGB 12.5 11.5 - 16.0 g/dL    HCT 36.4 35.0 - 47.0 %    MCV 93.1 80.0 - 99.0 FL    MCH 32.0 26.0 - 34.0 PG    MCHC 34.3 30.0 - 36.5 g/dL    RDW 11.6 11.5 - 14.5 %    PLATELET 320 000 - 391 K/uL    MPV 8.5 (L) 8.9 - 12.9 FL    NRBC 0.0 0  WBC    ABSOLUTE NRBC 0.00 0.00 - 0.01 K/uL    NEUTROPHILS 70 32 - 75 %    LYMPHOCYTES 16 12 - 49 %    MONOCYTES 11 5 - 13 %    EOSINOPHILS 2 0 - 7 %    BASOPHILS 1 0 - 1 %    IMMATURE GRANULOCYTES 0 0.0 - 0.5 %    ABS. NEUTROPHILS 3.9 1.8 - 8.0 K/UL    ABS. LYMPHOCYTES 0.9 0.8 - 3.5 K/UL    ABS. MONOCYTES 0.6 0.0 - 1.0 K/UL    ABS. EOSINOPHILS 0.1 0.0 - 0.4 K/UL    ABS. BASOPHILS 0.0 0.0 - 0.1 K/UL    ABS. IMM. GRANS. 0.0 0.00 - 0.04 K/UL    DF AUTOMATED     SARS-COV-2   Result Value Ref Range    SARS-CoV-2 by PCR Please find results under separate order     METABOLIC PANEL, COMPREHENSIVE   Result Value Ref Range    Sodium 130 (L) 136 - 145 mmol/L    Potassium 3.0 (L) 3.5 - 5.1 mmol/L    Chloride 99 97 - 108 mmol/L    CO2 23 21 - 32 mmol/L    Anion gap 8 5 - 15 mmol/L    Glucose 70 65 - 100 mg/dL    BUN 8 6 - 20 MG/DL    Creatinine 0.42 (L) 0.55 - 1.02 MG/DL    BUN/Creatinine ratio 19 12 - 20      GFR est AA >60 >60 ml/min/1.73m2    GFR est non-AA >60 >60 ml/min/1.73m2    Calcium 7.6 (L) 8.5 - 10.1 MG/DL    Bilirubin, total 1.4 (H) 0.2 - 1.0 MG/DL    ALT (SGPT) 19 12 - 78 U/L    AST (SGOT) 20 15 - 37 U/L    Alk. phosphatase 51 45 - 117 U/L    Protein, total 5.8 (L) 6.4 - 8.2 g/dL    Albumin 2.7 (L) 3.5 - 5.0 g/dL    Globulin 3.1 2.0 - 4.0 g/dL    A-G Ratio 0.9 (L) 1.1 - 2.2     CBC WITH AUTOMATED DIFF   Result Value Ref Range    WBC 3.8 3.6 - 11.0 K/uL    RBC 3.90 3.80 - 5.20 M/uL    HGB 12.4 11.5 - 16.0 g/dL    HCT 36.2 35.0 - 47.0 %    MCV 92.8 80.0 - 99.0 FL    MCH 31.8 26.0 - 34.0 PG    MCHC 34.3 30.0 - 36.5 g/dL    RDW 11.7 11.5 - 14.5 %    PLATELET 915 150 - 229 K/uL    MPV 8.7 (L) 8.9 - 12.9 FL    NRBC 0.0 0  WBC    ABSOLUTE NRBC 0.00 0.00 - 0.01 K/uL    NEUTROPHILS 55 32 - 75 %    LYMPHOCYTES 28 12 - 49 %    MONOCYTES 13 5 - 13 %    EOSINOPHILS 3 0 - 7 %    BASOPHILS 1 0 - 1 %    IMMATURE GRANULOCYTES 0 0.0 - 0.5 %    ABS. NEUTROPHILS 2.1 1.8 - 8.0 K/UL    ABS. LYMPHOCYTES 1.1 0.8 - 3.5 K/UL    ABS. MONOCYTES 0.5 0.0 - 1.0 K/UL    ABS. EOSINOPHILS 0.1 0.0 - 0.4 K/UL    ABS. BASOPHILS 0.0 0.0 - 0.1 K/UL    ABS. IMM.  GRANS. 0.0 0.00 - 0.04 K/UL    DF AUTOMATED     METABOLIC PANEL, COMPREHENSIVE Result Value Ref Range    Sodium 137 136 - 145 mmol/L    Potassium 3.3 (L) 3.5 - 5.1 mmol/L    Chloride 106 97 - 108 mmol/L    CO2 27 21 - 32 mmol/L    Anion gap 4 (L) 5 - 15 mmol/L    Glucose 87 65 - 100 mg/dL    BUN 8 6 - 20 MG/DL    Creatinine 0.43 (L) 0.55 - 1.02 MG/DL    BUN/Creatinine ratio 19 12 - 20      GFR est AA >60 >60 ml/min/1.73m2    GFR est non-AA >60 >60 ml/min/1.73m2    Calcium 7.8 (L) 8.5 - 10.1 MG/DL    Bilirubin, total 0.7 0.2 - 1.0 MG/DL    ALT (SGPT) 17 12 - 78 U/L    AST (SGOT) 12 (L) 15 - 37 U/L    Alk.  phosphatase 52 45 - 117 U/L    Protein, total 5.6 (L) 6.4 - 8.2 g/dL    Albumin 2.7 (L) 3.5 - 5.0 g/dL    Globulin 2.9 2.0 - 4.0 g/dL    A-G Ratio 0.9 (L) 1.1 - 2.2     MAGNESIUM   Result Value Ref Range    Magnesium 2.1 1.6 - 2.4 mg/dL   PHOSPHORUS   Result Value Ref Range    Phosphorus 2.5 (L) 2.6 - 4.7 MG/DL   LIPASE   Result Value Ref Range    Lipase 117 73 - 393 U/L   URINALYSIS W/ REFLEX CULTURE    Specimen: Urine   Result Value Ref Range    Color YELLOW/STRAW      Appearance CLEAR CLEAR      Specific gravity 1.005 1.003 - 1.030      pH (UA) 7.0 5.0 - 8.0      Protein Negative NEG mg/dL    Glucose Negative NEG mg/dL    Ketone Negative NEG mg/dL    Bilirubin Negative NEG      Blood SMALL (A) NEG      Urobilinogen 1.0 0.2 - 1.0 EU/dL    Nitrites Negative NEG      Leukocyte Esterase TRACE (A) NEG      WBC 0-4 0 - 4 /hpf    RBC 10-20 0 - 5 /hpf    Epithelial cells FEW FEW /lpf    Bacteria Negative NEG /hpf    UA:UC IF INDICATED CULTURE NOT INDICATED BY UA RESULT CNI      Hyaline cast 0-2 0 - 5 /lpf   METABOLIC PANEL, COMPREHENSIVE   Result Value Ref Range    Sodium 139 136 - 145 mmol/L    Potassium 3.3 (L) 3.5 - 5.1 mmol/L    Chloride 107 97 - 108 mmol/L    CO2 28 21 - 32 mmol/L    Anion gap 4 (L) 5 - 15 mmol/L    Glucose 100 65 - 100 mg/dL    BUN 8 6 - 20 MG/DL    Creatinine 0.44 (L) 0.55 - 1.02 MG/DL    BUN/Creatinine ratio 18 12 - 20      GFR est AA >60 >60 ml/min/1.73m2    GFR est non-AA >60 >60 ml/min/1.73m2    Calcium 7.7 (L) 8.5 - 10.1 MG/DL    Bilirubin, total 0.5 0.2 - 1.0 MG/DL    ALT (SGPT) 17 12 - 78 U/L    AST (SGOT) 17 15 - 37 U/L    Alk. phosphatase 49 45 - 117 U/L    Protein, total 5.5 (L) 6.4 - 8.2 g/dL    Albumin 2.6 (L) 3.5 - 5.0 g/dL    Globulin 2.9 2.0 - 4.0 g/dL    A-G Ratio 0.9 (L) 1.1 - 2.2     MAGNESIUM   Result Value Ref Range    Magnesium 1.8 1.6 - 2.4 mg/dL   LIPASE   Result Value Ref Range    Lipase 356 73 - 393 U/L        Exam:  Visit Vitals  /60 (BP 1 Location: Right arm, BP Patient Position: Sitting, BP Cuff Size: Adult)   Pulse 80   Temp 97.6 °F (36.4 °C) (Temporal)   Resp 16   SpO2 98%     General:  Alert, cooperative, no distress. Head:  Normocephalic, without obvious abnormality, atraumatic. Respiratory:  Heart:   Non labored breathing  Regular rate and rhythm, no murmurs   Neck:      Extremities: Warm, no cyanosis or edema. Pulses: 2+ radial pulses. Neurologic:  MS: Alert, speech intact. Language-see MMSE. Korin Li Attention and fund of knowledge appropriate. Recent and remote memory impaired to portions of history, has good memory of certain details.    Cranial Nerves:  II: visual fields VFF   II: pupils    II: optic disc    III,VII: ptosis none   III,IV,VI: extraocular muscles  EOMI, no nystagmus or diplopia   V: facial light touch sensation     VII: facial muscle function   symmetric   VIII: hearing intact   IX: soft palate elevation     XI: trapezius strength     XI: sternocleidomastoid strength    XII: tongue       Motor: normal bulk and tone, no tremor, Strength: 5/5 , bicep, HF, no PD  Sensory:   Coordination:   Gait: Unsteady gait   Reflexes:     Mini Mental State Exam 5/26/2022 12/1/2021 6/29/2020   What is the Year 1 0 1   What is the Season 1 1 1   What is the Date 0 1 1   What is the Day 1 1 1   What is the Month 1 1 1   Where are we State 1 1 1   Where are we Country 1 1 1   Where are we 7 [facility] 1 1 1   Where are we Floor 1 1 1 Name three objects, then ask the patient to say them 3 3 3   Serial sevens Subtract 7 from 100 in increments 5 5 5   Ask for the three objects repeated above 3 2 3   Name a pencil 1 1 1   Name a watch 1 1 1   Have the patient repeat this phrase \"No ifs, ands, or buts\" 1 1 1   Three stage command: Take the paper in your right hand 1 1 1   Fold the paper in half 1 1 1   Put the paper on the floor 1 1 1   Read and obey the following: CLOSE YOUR EYES 1 1 1   Have the patient write a sentence 1 1 1   Have the patient copy a figure 1 1 0   Mini Mental Score 29 28 29   Some recent data might be hidden          Assessment/Plan   Pt is a 89y. o. right handed female with CAA, diagnosed after hospitalized 3/18/19 - 3/21/19, presenting to [facility] with aphasia, right sided weakness, s/p IV TPA with subsequent SAH and IPH. Admitted to [facility] 2/4/21 - 2/5/21 again for word finding and right facial droop. MRI brain 2/5/21 was neg for acute stroke and pt was started on ASA 81mg daily. LDL was 99, started on Lipitor 40mg daily. Not taking ASA or statin. Exam remains stable, clear memory impairment to history, but scores well on MMSE 29/30, unsteady gait without walker. Continue Aricept 10mg daily. Reviewed diagnosis of CAA and risk/benefits of APT given h/o TIAs. Again suggested ASA 81mg daily. Discussed stroke risk factors and use of a statin to prevent stroke. Has TRI, also a stroke risk factor. F/u in clinic in 6 months, instructed to call in the interim if needed. ICD-10-CM ICD-9-CM    1. Cerebral amyloid angiopathy (CODE)  I68.0 277.39      437.9    2. TIA (transient ischemic attack)  G45.9 435.9    3. Memory loss  R41.3 780.93        Signed:   Priscilla Lange MD  5/26/2022

## 2022-09-16 NOTE — PROGRESS NOTES
Called primary Ob and she is aware of positive GBBS results.   Pharmacist Discharge Medication Reconciliation    Discharge Provider:  Dr. Jose Acosat       Discharge Medications:      My Medications        START taking these medications        Instructions Each Dose to Equal Morning Noon Evening Bedtime   Saccharomyces boulardii 250 mg capsule  Commonly known as: Florastor    Your last dose was: Your next dose is: Take 1 Capsule by mouth two (2) times a day for 7 days. 250 mg                        CONTINUE taking these medications        Instructions Each Dose to Equal Morning Noon Evening Bedtime   aspirin 81 mg chewable tablet    Your last dose was: Your next dose is: Take 1 Tab by mouth daily. 81 mg                 Azopt 1 % ophthalmic suspension  Generic drug: brinzolamide    Your last dose was: Your next dose is:         Administer 1 Drop to right eye two (2) times a day. 1 Drop                 Combigan 0.2-0.5 % Drop ophthalmic solution  Generic drug: brimonidine-timoloL    Your last dose was: Your next dose is:         Administer 1 Drop to right eye every twelve (12) hours. 1 Drop                 donepeziL 10 mg tablet  Commonly known as: ARICEPT    Your last dose was: Your next dose is: Take 10 mg by mouth nightly. 10 mg                 FLUoxetine 10 mg tablet  Commonly known as: PROzac    Your last dose was: Your next dose is:         TAKE 1/2 TABLET BY MOUTH EVERY MORNING WITH BREAKFAST                  levothyroxine 25 mcg tablet  Commonly known as: SYNTHROID    Your last dose was: Your next dose is:         TAKE 1 TABLET IN THE MORNING BEFORE BREAKFAST                  ondansetron 4 mg disintegrating tablet  Commonly known as: Zofran ODT    Your last dose was: Your next dose is: Take 1 Tablet by mouth every eight (8) hours as needed for Nausea or Vomiting. 4 mg                 Restasis 0.05 % Dpet  Generic drug: cycloSPORINE    Your last dose was:      Your next dose is:         Administer 1 Drop to both eyes two (2) times a day.    1 Drop                        STOP taking these medications      atorvastatin 40 mg tablet  Commonly known as: LIPITOR                  Where to Get Your Medications        These medications were sent to [address] [place] [address], [address] [place] [address] Road  [address], [address]      Phone: [phone]   Moraima roseulardii 250 mg capsule       Anaid Glaser, Pharmacist

## 2022-09-27 NOTE — LETTER
2/22/2021 Patient: Nabor Hussein YOB: 1928 Date of Visit: 2/22/2021 Fely Ann MD 
[address] [facility] [address] [place] [address] [phone] Via Fax: [phone] Dear Fely Ann MD, Thank you for referring Ms. Georgiana Trent to [address] [place] [address] for evaluation. My notes for this consultation are attached. If you have questions, please do not hesitate to call me. I look forward to following your patient along with you. Sincerely, Deborah Loaiza MD 
 
 Complex Repair And Z Plasty Text: The defect edges were debeveled with a #15 scalpel blade.  The primary defect was closed partially with a complex linear closure.  Given the location of the remaining defect, shape of the defect and the proximity to free margins a Z plasty was deemed most appropriate for complete closure of the defect.  Using a sterile surgical marker, an appropriate advancement flap was drawn incorporating the defect and placing the expected incisions within the relaxed skin tension lines where possible.    The area thus outlined was incised deep to adipose tissue with a #15 scalpel blade.  The skin margins were undermined to an appropriate distance in all directions utilizing iris scissors.

## 2024-07-09 ENCOUNTER — HOSPITAL ENCOUNTER (EMERGENCY)
Facility: HOSPITAL | Age: 89
Discharge: HOME | End: 2024-07-09
Attending: EMERGENCY MEDICINE | Admitting: EMERGENCY MEDICINE
Payer: MEDICARE

## 2024-07-09 ENCOUNTER — APPOINTMENT (OUTPATIENT)
Facility: HOSPITAL | Age: 89
End: 2024-07-09
Payer: MEDICARE

## 2024-07-09 VITALS
SYSTOLIC BLOOD PRESSURE: 142 MMHG | WEIGHT: 97.5 LBS | HEIGHT: 63 IN | DIASTOLIC BLOOD PRESSURE: 58 MMHG | OXYGEN SATURATION: 94 % | BODY MASS INDEX: 17.28 KG/M2 | RESPIRATION RATE: 16 BRPM | TEMPERATURE: 97.9 F | HEART RATE: 77 BPM

## 2024-07-09 DIAGNOSIS — M79.642 PAIN OF LEFT HAND: Primary | ICD-10-CM

## 2024-07-09 PROCEDURE — 73130 X-RAY EXAM OF HAND: CPT

## 2024-07-09 PROCEDURE — 99283 EMERGENCY DEPT VISIT LOW MDM: CPT

## 2024-07-09 ASSESSMENT — PAIN - FUNCTIONAL ASSESSMENT: PAIN_FUNCTIONAL_ASSESSMENT: NONE - DENIES PAIN

## 2024-07-09 NOTE — ED PROVIDER NOTES
[facility] EMERGENCY DEPT  EMERGENCY DEPARTMENT ENCOUNTER      Pt Name: Jessy Heath  MRN: [id number]  Birthdate 7/3/1928  Date of evaluation: 7/9/2024  Provider: Osito Luna MD    CHIEF COMPLAINT       Chief Complaint   Patient presents with    Joint Pain         HISTORY OF PRESENT ILLNESS   (Location/Symptom, Timing/Onset, Context/Setting, Quality, Duration, Modifying Factors, Severity)  Note limiting factors.   Ms. Heath is a 89-year-old female who presents to the ER with complaints of left hand pain.  Her son says that he was called at 4:30 in the morning because she was having severe left hand pain.  She describes her pain as pain and numbness at her fingertips of her second and through fifth fingers.  However, she has some difficulty remembering exactly what was going on and is not sure exactly the fingers are bothering her.  He said that the dorsal aspect of her hand were not involved.  The rest of her hand and arm were not involved either.  She said that her fingers felt much better when she presses on them really hard and dug her fingers into her nails.  Someone at the facility put a Band-Aid on her middle finger because that apparently made it feel better.  Son had arrived this morning at the facility.  Her pain resolved at some point and she went back to sleep.  He went home.  He was called again later and was told that her pain returned that she was being sent to the emergency room.  She denies any recent trauma to her hand.  She denies similar symptoms in the past.  She denies any other complaints.  Her history is limited by her baseline mental status.  History supplemented by her son.            Review of External Medical Records:     Nursing Notes were reviewed.    REVIEW OF SYSTEMS    (2-9 systems for level 4, 10 or more for level 5)     Review of Systems   Unable to perform ROS: Age   Musculoskeletal:         Hand pain       Except as noted above the remainder of the review of systems was reviewed

## 2024-07-09 NOTE — ED TRIAGE NOTES
Pt BIBEMS from [facility] with CC of let hand/foot pain.     No tingling/numbness reported. VSS. No reported fall.     Pt currently does not report pain. EMS reports anxious behaviors.

## 2024-07-30 NOTE — ROUTINE PROCESS
Called floor, asked to please complete Mri Checklist and call dept#[id number] when done to coordinate a scan time, thanks! Render Risk Assessment In Note?: no Detail Level: Simple Additional Notes: Pt has had this lesion for her whole life

## 2024-08-22 ENCOUNTER — HOSPITAL ENCOUNTER (EMERGENCY)
Facility: HOSPITAL | Age: 89
Discharge: HOME | End: 2024-08-22
Attending: EMERGENCY MEDICINE
Payer: MEDICARE

## 2024-08-22 ENCOUNTER — APPOINTMENT (OUTPATIENT)
Facility: HOSPITAL | Age: 89
End: 2024-08-22
Payer: MEDICARE

## 2024-08-22 VITALS
TEMPERATURE: 97.8 F | WEIGHT: 90.7 LBS | BODY MASS INDEX: 16.69 KG/M2 | OXYGEN SATURATION: 98 % | HEIGHT: 62 IN | DIASTOLIC BLOOD PRESSURE: 55 MMHG | SYSTOLIC BLOOD PRESSURE: 126 MMHG | RESPIRATION RATE: 19 BRPM | HEART RATE: 61 BPM

## 2024-08-22 DIAGNOSIS — M79.645 PAIN IN FINGER OF BOTH HANDS: Primary | ICD-10-CM

## 2024-08-22 DIAGNOSIS — R51.9 NONINTRACTABLE HEADACHE, UNSPECIFIED CHRONICITY PATTERN, UNSPECIFIED HEADACHE TYPE: ICD-10-CM

## 2024-08-22 DIAGNOSIS — M79.644 PAIN IN FINGER OF BOTH HANDS: Primary | ICD-10-CM

## 2024-08-22 LAB
ALBUMIN SERPL-MCNC: 3.6 G/DL (ref 3.5–5)
ALBUMIN/GLOB SERPL: 1 (ref 1.1–2.2)
ALP SERPL-CCNC: 57 U/L (ref 45–117)
ALT SERPL-CCNC: 22 U/L (ref 12–78)
ANION GAP SERPL CALC-SCNC: 3 MMOL/L (ref 5–15)
AST SERPL-CCNC: 20 U/L (ref 15–37)
BASOPHILS # BLD: 0 K/UL (ref 0–0.1)
BASOPHILS NFR BLD: 1 % (ref 0–1)
BILIRUB SERPL-MCNC: 0.6 MG/DL (ref 0.2–1)
BUN SERPL-MCNC: 23 MG/DL (ref 6–20)
BUN/CREAT SERPL: 28 (ref 12–20)
CALCIUM SERPL-MCNC: 9.8 MG/DL (ref 8.5–10.1)
CHLORIDE SERPL-SCNC: 107 MMOL/L (ref 97–108)
CO2 SERPL-SCNC: 29 MMOL/L (ref 21–32)
CREAT SERPL-MCNC: 0.82 MG/DL (ref 0.55–1.02)
DIFFERENTIAL METHOD BLD: ABNORMAL
EOSINOPHIL # BLD: 0.2 K/UL (ref 0–0.4)
EOSINOPHIL NFR BLD: 4 % (ref 0–7)
ERYTHROCYTE [DISTWIDTH] IN BLOOD BY AUTOMATED COUNT: 12.6 % (ref 11.5–14.5)
ERYTHROCYTE [SEDIMENTATION RATE] IN BLOOD: 25 MM/HR (ref 0–30)
GLOBULIN SER CALC-MCNC: 3.6 G/DL (ref 2–4)
GLUCOSE SERPL-MCNC: 99 MG/DL (ref 65–100)
HCT VFR BLD AUTO: 35.5 % (ref 35–47)
HGB BLD-MCNC: 12.3 G/DL (ref 11.5–16)
IMM GRANULOCYTES # BLD AUTO: 0 K/UL (ref 0–0.04)
IMM GRANULOCYTES NFR BLD AUTO: 0 % (ref 0–0.5)
LYMPHOCYTES # BLD: 2.3 K/UL (ref 0.8–3.5)
LYMPHOCYTES NFR BLD: 47 % (ref 12–49)
MAGNESIUM SERPL-MCNC: 2 MG/DL (ref 1.6–2.4)
MCH RBC QN AUTO: 32 PG (ref 26–34)
MCHC RBC AUTO-ENTMCNC: 34.6 G/DL (ref 30–36.5)
MCV RBC AUTO: 92.4 FL (ref 80–99)
MONOCYTES # BLD: 0.6 K/UL (ref 0–1)
MONOCYTES NFR BLD: 12 % (ref 5–13)
NEUTS SEG # BLD: 1.7 K/UL (ref 1.8–8)
NEUTS SEG NFR BLD: 36 % (ref 32–75)
NRBC # BLD: 0 K/UL (ref 0–0.01)
NRBC BLD-RTO: 0 PER 100 WBC
PLATELET # BLD AUTO: 212 K/UL (ref 150–400)
PMV BLD AUTO: 8.8 FL (ref 8.9–12.9)
POTASSIUM SERPL-SCNC: 3.8 MMOL/L (ref 3.5–5.1)
PROT SERPL-MCNC: 7.2 G/DL (ref 6.4–8.2)
RBC # BLD AUTO: 3.84 M/UL (ref 3.8–5.2)
SODIUM SERPL-SCNC: 139 MMOL/L (ref 136–145)
WBC # BLD AUTO: 4.8 K/UL (ref 3.6–11)

## 2024-08-22 PROCEDURE — 36415 COLL VENOUS BLD VENIPUNCTURE: CPT

## 2024-08-22 PROCEDURE — 85652 RBC SED RATE AUTOMATED: CPT

## 2024-08-22 PROCEDURE — 99284 EMERGENCY DEPT VISIT MOD MDM: CPT

## 2024-08-22 PROCEDURE — 70450 CT HEAD/BRAIN W/O DYE: CPT

## 2024-08-22 PROCEDURE — 6370000000 HC RX 637 (ALT 250 FOR IP): Performed by: EMERGENCY MEDICINE

## 2024-08-22 PROCEDURE — 96374 THER/PROPH/DIAG INJ IV PUSH: CPT

## 2024-08-22 PROCEDURE — 80053 COMPREHEN METABOLIC PANEL: CPT

## 2024-08-22 PROCEDURE — 83735 ASSAY OF MAGNESIUM: CPT

## 2024-08-22 PROCEDURE — 6360000002 HC RX W HCPCS: Performed by: EMERGENCY MEDICINE

## 2024-08-22 PROCEDURE — 2580000003 HC RX 258: Performed by: EMERGENCY MEDICINE

## 2024-08-22 PROCEDURE — 85025 COMPLETE CBC W/AUTO DIFF WBC: CPT

## 2024-08-22 RX ORDER — 0.9 % SODIUM CHLORIDE 0.9 %
500 INTRAVENOUS SOLUTION INTRAVENOUS ONCE
Status: COMPLETED | OUTPATIENT
Start: 2024-08-22 | End: 2024-08-22

## 2024-08-22 RX ORDER — ACETAMINOPHEN 500 MG
1000 TABLET ORAL
Status: COMPLETED | OUTPATIENT
Start: 2024-08-22 | End: 2024-08-22

## 2024-08-22 RX ORDER — MORPHINE SULFATE 4 MG/ML
4 INJECTION, SOLUTION INTRAMUSCULAR; INTRAVENOUS
Status: COMPLETED | OUTPATIENT
Start: 2024-08-22 | End: 2024-08-22

## 2024-08-22 RX ADMIN — MORPHINE SULFATE 4 MG: 4 INJECTION, SOLUTION INTRAMUSCULAR; INTRAVENOUS at 09:26

## 2024-08-22 RX ADMIN — SODIUM CHLORIDE 500 ML: 9 INJECTION, SOLUTION INTRAVENOUS at 08:16

## 2024-08-22 RX ADMIN — ACETAMINOPHEN 1000 MG: 500 TABLET ORAL at 08:08

## 2024-08-22 ASSESSMENT — PAIN DESCRIPTION - LOCATION: LOCATION: FINGER (COMMENT WHICH ONE)

## 2024-08-22 ASSESSMENT — PAIN SCALES - GENERAL: PAINLEVEL_OUTOF10: 10

## 2024-08-22 ASSESSMENT — PAIN DESCRIPTION - DESCRIPTORS: DESCRIPTORS: ACHING

## 2024-08-22 ASSESSMENT — PAIN DESCRIPTION - ORIENTATION: ORIENTATION: RIGHT;LEFT

## 2024-08-22 ASSESSMENT — PAIN - FUNCTIONAL ASSESSMENT: PAIN_FUNCTIONAL_ASSESSMENT: NONE - DENIES PAIN

## 2024-08-22 ASSESSMENT — LIFESTYLE VARIABLES
HOW MANY STANDARD DRINKS CONTAINING ALCOHOL DO YOU HAVE ON A TYPICAL DAY: PATIENT DOES NOT DRINK
HOW OFTEN DO YOU HAVE A DRINK CONTAINING ALCOHOL: NEVER

## 2024-08-22 NOTE — ED NOTES
While in triage pt reports \"my headache is gone.  Maybe because of all of the attention I have gotten.  My fingers are not hurting anymore either!\"

## 2024-08-22 NOTE — ED TRIAGE NOTES
Pt. Brought in by EMS from [facility] for headache and tingling in her fingertips on both hands. Denies NVD.

## 2024-08-22 NOTE — ED PROVIDER NOTES
[facility] EMERGENCY DEPT  EMERGENCY DEPARTMENT ENCOUNTER      Patient Name: Jessy Heath  MRN: [id number]  Birthdate 7/3/1928  Date of Evaluation: 8/22/2024  Physician: Josiah Dexter MD    CHIEF COMPLAINT       Chief Complaint   Patient presents with    Finger Pain    Headache       HISTORY OF PRESENT ILLNESS   (Location/Symptom, Timing/Onset, Context/Setting, Quality, Duration, Modifying Factors, Severity)   Jessy Heath, 89 y.o., female     89-year-old female with a history of cerebral amyloid angiopathy, CVA with right-sided weakness presents with a chief complaint of headache and bilateral fingertip pain.  There is no report of trauma.  Patient resides in [facility] and EMS found her in bed today.  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    REVIEW OF SYSTEMS    (Not required)   Review of Systems    Except as noted above the remainder of the review of systems was reviewed and negative.     PAST MEDICAL HISTORY     Past Medical History:   Diagnosis Date    Cerebral amyloid angiopathy (HCC)     CVA (cerebral vascular accident) (HCC) 03/2019    aphasia and right sided weakness, s/p IV TPA with subsequent SAH/IPH, found to have change c/w CAA    Hypothyroid        SURGICAL HISTORY       Past Surgical History:   Procedure Laterality Date    BACK SURGERY      HYSTERECTOMY      TONSILLECTOMY      TOTAL HIP ARTHROPLASTY      right       CURRENT MEDICATIONS       Discharge Medication List as of 8/22/2024  9:15 AM        CONTINUE these medications which have NOT CHANGED    Details   aspirin 81 MG chewable tablet Take 81 mg by mouth dailyHistorical Med      brimonidine-timolol (COMBIGAN) 0.2-0.5 % ophthalmic solution Apply 1 drop to eye in the morning and 1 drop in the evening.Historical Med      brinzolamide (AZOPT) 1 % ophthalmic suspension Apply 1 drop to eye 2 times dailyHistorical Med      cyanocobalamin 1000 MCG tablet Take

## 2024-08-24 ENCOUNTER — HOSPITAL ENCOUNTER (EMERGENCY)
Facility: HOSPITAL | Age: 89
Discharge: HOME | End: 2024-08-24
Attending: EMERGENCY MEDICINE
Payer: MEDICARE

## 2024-08-24 VITALS
RESPIRATION RATE: 14 BRPM | BODY MASS INDEX: 15.95 KG/M2 | DIASTOLIC BLOOD PRESSURE: 56 MMHG | HEIGHT: 63 IN | WEIGHT: 90 LBS | SYSTOLIC BLOOD PRESSURE: 135 MMHG | HEART RATE: 60 BPM | OXYGEN SATURATION: 98 % | TEMPERATURE: 98.2 F

## 2024-08-24 DIAGNOSIS — M79.645 PAIN IN LEFT FINGER(S): Primary | ICD-10-CM

## 2024-08-24 PROCEDURE — 6360000002 HC RX W HCPCS: Performed by: EMERGENCY MEDICINE

## 2024-08-24 PROCEDURE — 96374 THER/PROPH/DIAG INJ IV PUSH: CPT

## 2024-08-24 PROCEDURE — 96372 THER/PROPH/DIAG INJ SC/IM: CPT

## 2024-08-24 PROCEDURE — 99284 EMERGENCY DEPT VISIT MOD MDM: CPT

## 2024-08-24 RX ORDER — KETOROLAC TROMETHAMINE 15 MG/ML
15 INJECTION, SOLUTION INTRAMUSCULAR; INTRAVENOUS ONCE
Status: COMPLETED | OUTPATIENT
Start: 2024-08-24 | End: 2024-08-24

## 2024-08-24 RX ORDER — MORPHINE SULFATE 2 MG/ML
2 INJECTION, SOLUTION INTRAMUSCULAR; INTRAVENOUS
Status: COMPLETED | OUTPATIENT
Start: 2024-08-24 | End: 2024-08-24

## 2024-08-24 RX ADMIN — MORPHINE SULFATE 2 MG: 2 INJECTION, SOLUTION INTRAMUSCULAR; INTRAVENOUS at 03:27

## 2024-08-24 RX ADMIN — KETOROLAC TROMETHAMINE 15 MG: 15 INJECTION, SOLUTION INTRAMUSCULAR; INTRAVENOUS at 03:35

## 2024-08-24 ASSESSMENT — PAIN DESCRIPTION - DESCRIPTORS
DESCRIPTORS: THROBBING
DESCRIPTORS: ACHING

## 2024-08-24 ASSESSMENT — PAIN DESCRIPTION - ORIENTATION
ORIENTATION: LEFT
ORIENTATION: LEFT

## 2024-08-24 ASSESSMENT — PAIN - FUNCTIONAL ASSESSMENT: PAIN_FUNCTIONAL_ASSESSMENT: 0-10

## 2024-08-24 ASSESSMENT — LIFESTYLE VARIABLES
HOW OFTEN DO YOU HAVE A DRINK CONTAINING ALCOHOL: NEVER
HOW MANY STANDARD DRINKS CONTAINING ALCOHOL DO YOU HAVE ON A TYPICAL DAY: PATIENT DOES NOT DRINK

## 2024-08-24 ASSESSMENT — PAIN DESCRIPTION - PAIN TYPE: TYPE: ACUTE PAIN

## 2024-08-24 ASSESSMENT — PAIN SCALES - GENERAL: PAINLEVEL_OUTOF10: 2

## 2024-08-24 ASSESSMENT — PAIN DESCRIPTION - LOCATION
LOCATION: HAND
LOCATION: FINGER (COMMENT WHICH ONE)

## 2024-08-24 NOTE — DISCHARGE INSTRUCTIONS
Please take 1000 mg Tylenol as needed for finger pain.  You can start taking Tylenol nightly for the next 3 days to see if that helps your pain until you can see your PCP.  You can take Tylenol, 1000 mg 4 times a day as needed for pain.  Thank you.

## 2024-08-24 NOTE — ED TRIAGE NOTES
Patent arrived from [facility] with complaint of left hand pain. Patient was seen in the ED last night for the hand pain. Patient reports that she has left arm numbness, pain minimal.

## 2024-08-25 NOTE — ED PROVIDER NOTES
[facility] EMERGENCY DEPT  EMERGENCY DEPARTMENT ENCOUNTER      Pt Name: Jessy Heath  MRN: [id number]  Birthdate 7/3/1928  Date of evaluation: 8/24/2024  Provider: Sandra Green MD    CHIEF COMPLAINT       Chief Complaint   Patient presents with    Hand Pain         HISTORY OF PRESENT ILLNESS   (Location/Symptom, Timing/Onset, Context/Setting, Quality, Duration, Modifying Factors, Severity)  Note limiting factors.   Patient is a 89-year-old female with history of cerebral amyloid angiopathy, CVA, hypothyroidism who presents with pain in her left fingers diffusely.  At time of my evaluation, patient has no pain.  Son is at bedside who reports that a few minutes ago she was complaining of pain in her left thumb.  She was seen 2 days ago for similar complaints and received blood work and imaging at the time that was negative.  Patient unable to provide much history.  Unsure how often the pain occurs.  Denies any inciting trauma today. No left hand weakness.         Nursing Notes were reviewed.    REVIEW OF SYSTEMS    Not Required     Review of Systems    PAST MEDICAL HISTORY     Past Medical History:   Diagnosis Date    Cerebral amyloid angiopathy (HCC)     CVA (cerebral vascular accident) (HCC) 03/2019    aphasia and right sided weakness, s/p IV TPA with subsequent SAH/IPH, found to have change c/w CAA    Hypothyroid        SURGICAL HISTORY       Past Surgical History:   Procedure Laterality Date    BACK SURGERY      HYSTERECTOMY      TONSILLECTOMY      TOTAL HIP ARTHROPLASTY      right       CURRENT MEDICATIONS       Discharge Medication List as of 8/24/2024  5:17 AM        CONTINUE these medications which have NOT CHANGED    Details   aspirin 81 MG chewable tablet Take 81 mg by mouth dailyHistorical Med      brimonidine-timolol (COMBIGAN) 0.2-0.5 % ophthalmic solution Apply 1 drop to eye in the morning and 1 drop in the evening.Historical Med      brinzolamide (AZOPT) 1 % ophthalmic suspension Apply 1 drop to eye 2  External ear normal.      Nose: Nose normal.      Mouth/Throat:      Mouth: Mucous membranes are moist.   Eyes:      General: No scleral icterus.  Cardiovascular:      Rate and Rhythm: Normal rate and regular rhythm.   Pulmonary:      Effort: Pulmonary effort is normal. No respiratory distress.      Breath sounds: No stridor.   Abdominal:      General: There is no distension.   Musculoskeletal:         General: No swelling, tenderness or deformity.      Cervical back: Neck supple. No rigidity.      Comments: FROM of left fingers, good capillary refill. Motor and sensation grossly intact.    Skin:     Coloration: Skin is not jaundiced.   Neurological:      Mental Status: She is oriented to person, place, and time.   Psychiatric:         Behavior: Behavior normal.         DIAGNOSTIC RESULTS       RADIOLOGY:   Non-plain film images such as CT, Ultrasound and MRI are read by the radiologist. Plain radiographic images are visualized and preliminarily interpreted by the emergency physician with the below findings:    See ED course below.     Interpretation per the Radiologist below, if available at the time of this note:    No orders to display        LABS:  Labs Reviewed - No data to display    All other labs were within normal range or not returned as of this dictation.    EMERGENCY DEPARTMENT COURSE and DIFFERENTIAL DIAGNOSIS/MDM:   Medical Decision Making  Patient asymptomatic at time of my evaluation.  Has been seen recently for similar complaint with negative workup.  Son was at bedside reports they have a PCP appointment coming up next week.  I recommended giving patient Tylenol with dinner as he thinks the pain occurs at night when she is by herself since her caregiver leaves.  He thinks that she will not be able to take Tylenol by herself when pain occurs which is why she called EMS.  Is symptom-free during ED stay.  Will discharge.    Risk  Prescription drug management.        EKG: All EKG's are interpreted by

## 2024-09-18 ENCOUNTER — HOSPITAL ENCOUNTER (EMERGENCY)
Facility: HOSPITAL | Age: 89
Discharge: HOME | End: 2024-09-19
Attending: EMERGENCY MEDICINE
Payer: MEDICARE

## 2024-09-18 VITALS
BODY MASS INDEX: 17.72 KG/M2 | WEIGHT: 100 LBS | OXYGEN SATURATION: 98 % | TEMPERATURE: 97.7 F | DIASTOLIC BLOOD PRESSURE: 65 MMHG | HEART RATE: 94 BPM | SYSTOLIC BLOOD PRESSURE: 103 MMHG | RESPIRATION RATE: 16 BRPM | HEIGHT: 63 IN

## 2024-09-18 DIAGNOSIS — T14.8XXA MULTIPLE SKIN TEARS: ICD-10-CM

## 2024-09-18 DIAGNOSIS — L03.119 CELLULITIS OF LOWER EXTREMITY, UNSPECIFIED LATERALITY: Primary | ICD-10-CM

## 2024-09-18 PROCEDURE — 99283 EMERGENCY DEPT VISIT LOW MDM: CPT

## 2024-09-18 PROCEDURE — 6370000000 HC RX 637 (ALT 250 FOR IP): Performed by: EMERGENCY MEDICINE

## 2024-09-18 RX ORDER — DOXYCYCLINE HYCLATE 100 MG
100 TABLET ORAL ONCE
Status: COMPLETED | OUTPATIENT
Start: 2024-09-18 | End: 2024-09-18

## 2024-09-18 RX ORDER — DOXYCYCLINE HYCLATE 100 MG
100 TABLET ORAL 2 TIMES DAILY
Status: DISCONTINUED | OUTPATIENT
Start: 2024-09-19 | End: 2024-09-19 | Stop reason: HOSPADM

## 2024-09-18 RX ADMIN — DOXYCYCLINE HYCLATE 100 MG: 100 TABLET, COATED ORAL at 23:53

## 2024-09-18 ASSESSMENT — PAIN - FUNCTIONAL ASSESSMENT: PAIN_FUNCTIONAL_ASSESSMENT: NONE - DENIES PAIN

## 2024-09-19 RX ORDER — DOXYCYCLINE HYCLATE 100 MG
100 TABLET ORAL 2 TIMES DAILY
Qty: 20 TABLET | Refills: 0 | Status: SHIPPED | OUTPATIENT
Start: 2024-09-19 | End: 2024-09-29

## 2025-01-02 ENCOUNTER — OFFICE VISIT (OUTPATIENT)
Age: 89
End: 2025-01-02
Payer: MEDICARE

## 2025-01-02 VITALS
RESPIRATION RATE: 20 BRPM | DIASTOLIC BLOOD PRESSURE: 68 MMHG | OXYGEN SATURATION: 98 % | SYSTOLIC BLOOD PRESSURE: 132 MMHG | HEART RATE: 78 BPM

## 2025-01-02 DIAGNOSIS — R44.1 HALLUCINATIONS, VISUAL: ICD-10-CM

## 2025-01-02 DIAGNOSIS — E85.4 CEREBRAL AMYLOID ANGIOPATHY (HCC): ICD-10-CM

## 2025-01-02 DIAGNOSIS — I68.0 CEREBRAL AMYLOID ANGIOPATHY (HCC): ICD-10-CM

## 2025-01-02 DIAGNOSIS — Z86.73 HISTORY OF TIA (TRANSIENT ISCHEMIC ATTACK): ICD-10-CM

## 2025-01-02 DIAGNOSIS — M79.645 PAIN IN LEFT FINGER(S): Primary | ICD-10-CM

## 2025-01-02 PROCEDURE — 99214 OFFICE O/P EST MOD 30 MIN: CPT

## 2025-01-02 PROCEDURE — 1036F TOBACCO NON-USER: CPT

## 2025-01-02 PROCEDURE — 1090F PRES/ABSN URINE INCON ASSESS: CPT

## 2025-01-02 PROCEDURE — 1126F AMNT PAIN NOTED NONE PRSNT: CPT

## 2025-01-02 PROCEDURE — 1160F RVW MEDS BY RX/DR IN RCRD: CPT

## 2025-01-02 PROCEDURE — 1123F ACP DISCUSS/DSCN MKR DOCD: CPT

## 2025-01-02 PROCEDURE — G8419 CALC BMI OUT NRM PARAM NOF/U: HCPCS

## 2025-01-02 PROCEDURE — 1159F MED LIST DOCD IN RCRD: CPT

## 2025-01-02 PROCEDURE — G8427 DOCREV CUR MEDS BY ELIG CLIN: HCPCS

## 2025-01-02 NOTE — PROGRESS NOTES
Jessy Heath is a 89 y.o. female who presents with the following  Chief Complaint   Patient presents with    Follow-up     Pain in hands and fingers        HPI  Patient is here today with her son for follow-up.  Patient was last seen May 26, 2022 by Dr. Elliott for cerebral amyloid angiopathy with history of 2 TIAs with aphasia and right-sided weakness.  Patient is here today because she has been having pain in her hands primarily the left hand at least since July 2024.  She has been to the emergency room 3 times to try and get answers about what could be wrong with them.  It was suggested that the patient follow-up with neurology and to wear a wrist splint especially at night.  Today the patient is wearing the wrist splint on her left wrist and says that it is helpful when she wears it.  She continues to have paresthesias mainly in her left hand fingers.    Patient's son also tells me that last night the patient reported seeing vivid colors and faces on the ceiling, got up to investigate and fell backwards.  The patient's son says that she has had 2 hallucination instances in the past once where she said she saw some body take her coat from a closet to steal however, the code was in the closet when family checked.  No Known Allergies    Current Outpatient Medications   Medication Sig Dispense Refill    aspirin 81 MG chewable tablet Take 1 tablet by mouth daily      brimonidine-timolol (COMBIGAN) 0.2-0.5 % ophthalmic solution Apply 1 drop to eye in the morning and 1 drop in the evening.      brinzolamide (AZOPT) 1 % ophthalmic suspension Apply 1 drop to eye 2 times daily      cyanocobalamin 1000 MCG tablet Take 5 tablets by mouth daily      cycloSPORINE (RESTASIS) 0.05 % ophthalmic emulsion Apply 1 drop to eye 2 times daily      donepezil (ARICEPT) 10 MG tablet ceived the following from [facility]: Outside name: donepeziL (ARICEPT) 10 mg tablet      ergocalciferol (ERGOCALCIFEROL) 1.25 MG (65129 UT)

## 2025-01-02 NOTE — PROGRESS NOTES
Pain in her hands, fingers are so painful  Both hands, mostly in the left hand  Since the ER visit the right hand has started to ache as well   It had come up sometime ago years even and then went away according to her and then in the last 6 months or so it has come back and its very intense   It is just in her hands, some tingling

## 2025-01-16 ENCOUNTER — PROCEDURE VISIT (OUTPATIENT)
Age: 89
End: 2025-01-16
Payer: MEDICARE

## 2025-01-16 DIAGNOSIS — R20.2 PARESTHESIA: Primary | ICD-10-CM

## 2025-01-16 PROCEDURE — 95886 MUSC TEST DONE W/N TEST COMP: CPT | Performed by: PSYCHIATRY & NEUROLOGY

## 2025-01-16 PROCEDURE — 95911 NRV CNDJ TEST 9-10 STUDIES: CPT | Performed by: PSYCHIATRY & NEUROLOGY

## 2025-01-16 NOTE — PROGRESS NOTES
Motor (Abd Poll Brev)  29.3 °C   Wrist    9.1 <4.5 2.3 >4.1 100.0 Wrist Abd Poll Brev 8.0     Elbow    12.5  2.1  91.3 Elbow Wrist 18.0 53 >49   Right Median Motor (Abd Poll Brev)  32.4 °C   Wrist    4.9 <4.5 4.3 >4.1 100.0 Wrist Abd Poll Brev 8.0     Elbow    8.7  3.9  90.7 Elbow Wrist 16.5 43 >49   Left Ulnar Motor (Abd Dig Minimi)  32.2 °C   Wrist    3.1 <3.1 8.0 >7.0 100.0 Wrist Abd Dig Minimi 8.0     B Elbow    6.6  7.7  96.3 B Elbow Wrist 17.5 50 >50   A Elbow    8.4  7.5  97.4 A Elbow B Elbow 10.0 56 >50   Right Ulnar Motor (Abd Dig Minimi)  31.7 °C   Wrist    3.8 <3.1 6.2 >7.0 100.0 Wrist Abd Dig Minimi 8.0     B Elbow    6.6  6.5  104.8 B Elbow Wrist 17.0 61 >50   A Elbow    8.4  8.5  130.8 A Elbow B Elbow 10.0 56 >50     F Wave Studies     NR F-Lat (ms) Lat Norm (ms) L-R F-Lat (ms) L-R Lat Norm   Left Ulnar (Mrkrs) (Abd Dig Min)  32.1 °C      25.61 <32 3.98 <2.5   Right Ulnar (Mrkrs) (Abd Dig Min)  31.9 °C      29.59 <32 3.98 <2.5     EMG     Side Muscle Nerve Root Ins Act Fibs Psw Recrt Duration Amp Poly Comment   Right 1stDorInt Ulnar C8-T1 Nml Nml Nml Nml Nml Nml Nml    Right ExtIndicis Radial (Post Int) C7-8 Nml Nml Nml Nml Nml Nml Nml    Right Abd Poll Brev Median C8-T1 Nml Nml Nml Nml Nml Nml Nml    Right Biceps Musculocut C5-6 Nml Nml Nml Nml Nml Nml Nml    Right Triceps Radial C6-7-8 Nml Nml Nml Nml Nml Nml Nml    Right Deltoid Axillary C5-6 Nml Nml Nml Nml Nml Nml Nml    Left 1stDorInt Ulnar C8-T1 Nml Nml Nml Nml Nml Nml Nml    Left ExtIndicis Radial (Post Int) C7-8 Nml Nml Nml Nml Nml Nml Nml    Left Abd Poll Brev Median C8-T1 Nml Nml Nml Nml Nml Nml Nml    Left Biceps Musculocut C5-6 Nml Nml Nml Nml Nml Nml Nml    Left Triceps Radial C6-7-8 Nml Nml Nml Nml Nml Nml Nml    Left Deltoid Axillary C5-6 Nml Nml Nml Nml Nml Nml Nml    Left Lower Cerv Parasp Rami C7,T1 Nml Nml Nml Nml Nml Nml Nml                Nerve Conduction Studies  Anti Sensory Left/Right Comparison     Stim Site L Lat (ms) R Lat

## 2025-01-20 NOTE — PROGRESS NOTES
Jessy Heath is a 89 y.o. female who presents with the following  Chief Complaint   Patient presents with    Follow-up     Test results from EMG      Last office visit note  HPI  Patient is here today with her son for follow-up.  Patient was last seen May 26, 2022 by Dr. Elliott for cerebral amyloid angiopathy with history of 2 TIAs with aphasia and right-sided weakness.  Patient is here today because she has been having pain in her hands primarily the left hand at least since July 2024.  She has been to the emergency room 3 times to try and get answers about what could be wrong with them.  It was suggested that the patient follow-up with neurology and to wear a wrist splint especially at night.  Today the patient is wearing the wrist splint on her left wrist and says that it is helpful when she wears it.  She continues to have paresthesias mainly in her left hand fingers.     Patient's son also tells me that last night the patient reported seeing vivid colors and faces on the ceiling, got up to investigate and fell backwards.  The patient's son says that she has had 2 hallucination instances in the past once where she said she saw some body take her coat from a closet to steal however, the code was in the closet when family checked.    Will have the patient do an EMG of the bilateral upper extremities to rule out carpal tunnel or ulnar neuropathy as the cause of her finger/hand pain.     Advised that the patient's son have primary care do a urine check for possible hallucinations occurring last night.  Follow-up after EMG    Today's office visit note  HPI  Patient is here today with her daughter for test results follow-up.  Patient did have the EMG of her bilateral upper extremities for complaints of pain in her hands since July.  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    No

## 2025-01-21 ENCOUNTER — OFFICE VISIT (OUTPATIENT)
Age: 89
End: 2025-01-21
Payer: MEDICARE

## 2025-01-21 VITALS
SYSTOLIC BLOOD PRESSURE: 136 MMHG | HEART RATE: 64 BPM | OXYGEN SATURATION: 96 % | RESPIRATION RATE: 20 BRPM | DIASTOLIC BLOOD PRESSURE: 70 MMHG

## 2025-01-21 DIAGNOSIS — G56.03 CARPAL TUNNEL SYNDROME ON BOTH SIDES: Primary | ICD-10-CM

## 2025-01-21 PROCEDURE — 1126F AMNT PAIN NOTED NONE PRSNT: CPT

## 2025-01-21 PROCEDURE — 1036F TOBACCO NON-USER: CPT

## 2025-01-21 PROCEDURE — G8427 DOCREV CUR MEDS BY ELIG CLIN: HCPCS

## 2025-01-21 PROCEDURE — 1090F PRES/ABSN URINE INCON ASSESS: CPT

## 2025-01-21 PROCEDURE — 1160F RVW MEDS BY RX/DR IN RCRD: CPT

## 2025-01-21 PROCEDURE — 1123F ACP DISCUSS/DSCN MKR DOCD: CPT

## 2025-01-21 PROCEDURE — 1159F MED LIST DOCD IN RCRD: CPT

## 2025-01-21 PROCEDURE — G8419 CALC BMI OUT NRM PARAM NOF/U: HCPCS

## 2025-01-21 PROCEDURE — 99213 OFFICE O/P EST LOW 20 MIN: CPT

## 2025-02-09 ENCOUNTER — APPOINTMENT (OUTPATIENT)
Facility: HOSPITAL | Age: 89
End: 2025-02-09
Payer: MEDICARE

## 2025-02-09 ENCOUNTER — HOSPITAL ENCOUNTER (EMERGENCY)
Facility: HOSPITAL | Age: 89
Discharge: HOME | End: 2025-02-09
Attending: EMERGENCY MEDICINE
Payer: MEDICARE

## 2025-02-09 VITALS
SYSTOLIC BLOOD PRESSURE: 110 MMHG | OXYGEN SATURATION: 95 % | HEART RATE: 90 BPM | HEIGHT: 63 IN | RESPIRATION RATE: 16 BRPM | TEMPERATURE: 98 F | BODY MASS INDEX: 17.71 KG/M2 | DIASTOLIC BLOOD PRESSURE: 55 MMHG

## 2025-02-09 DIAGNOSIS — R53.1 GENERAL WEAKNESS: Primary | ICD-10-CM

## 2025-02-09 LAB
ALBUMIN SERPL-MCNC: 2.9 G/DL (ref 3.5–5)
ALBUMIN/GLOB SERPL: 0.6 (ref 1.1–2.2)
ALP SERPL-CCNC: 65 U/L (ref 45–117)
ALT SERPL-CCNC: 13 U/L (ref 12–78)
ANION GAP SERPL CALC-SCNC: 6 MMOL/L (ref 2–12)
AST SERPL-CCNC: 19 U/L (ref 15–37)
BASOPHILS # BLD: 0.05 K/UL (ref 0–0.1)
BASOPHILS NFR BLD: 0.5 % (ref 0–1)
BILIRUB SERPL-MCNC: 0.3 MG/DL (ref 0.2–1)
BUN SERPL-MCNC: 20 MG/DL (ref 6–20)
BUN/CREAT SERPL: 22 (ref 12–20)
CALCIUM SERPL-MCNC: 9.3 MG/DL (ref 8.5–10.1)
CHLORIDE SERPL-SCNC: 104 MMOL/L (ref 97–108)
CO2 SERPL-SCNC: 28 MMOL/L (ref 21–32)
CREAT SERPL-MCNC: 0.92 MG/DL (ref 0.55–1.02)
DIFFERENTIAL METHOD BLD: ABNORMAL
EOSINOPHIL # BLD: 0.07 K/UL (ref 0–0.4)
EOSINOPHIL NFR BLD: 0.8 % (ref 0–7)
ERYTHROCYTE [DISTWIDTH] IN BLOOD BY AUTOMATED COUNT: 12 % (ref 11.5–14.5)
GLOBULIN SER CALC-MCNC: 4.8 G/DL (ref 2–4)
GLUCOSE SERPL-MCNC: 150 MG/DL (ref 65–100)
HCT VFR BLD AUTO: 34.6 % (ref 35–47)
HGB BLD-MCNC: 11.2 G/DL (ref 11.5–16)
IMM GRANULOCYTES # BLD AUTO: 0.03 K/UL (ref 0–0.04)
IMM GRANULOCYTES NFR BLD AUTO: 0.3 % (ref 0–0.5)
LYMPHOCYTES # BLD: 0.82 K/UL (ref 0.8–3.5)
LYMPHOCYTES NFR BLD: 8.9 % (ref 12–49)
MCH RBC QN AUTO: 31.2 PG (ref 26–34)
MCHC RBC AUTO-ENTMCNC: 32.4 G/DL (ref 30–36.5)
MCV RBC AUTO: 96.4 FL (ref 80–99)
MONOCYTES # BLD: 0.54 K/UL (ref 0–1)
MONOCYTES NFR BLD: 5.8 % (ref 5–13)
NEUTS SEG # BLD: 7.74 K/UL (ref 1.8–8)
NEUTS SEG NFR BLD: 83.7 % (ref 32–75)
NRBC # BLD: 0 K/UL (ref 0–0.01)
NRBC BLD-RTO: 0 PER 100 WBC
PLATELET # BLD AUTO: 314 K/UL (ref 150–400)
PMV BLD AUTO: 8.7 FL (ref 8.9–12.9)
POTASSIUM SERPL-SCNC: 4.3 MMOL/L (ref 3.5–5.1)
PROT SERPL-MCNC: 7.7 G/DL (ref 6.4–8.2)
RBC # BLD AUTO: 3.59 M/UL (ref 3.8–5.2)
SODIUM SERPL-SCNC: 138 MMOL/L (ref 136–145)
TROPONIN I SERPL HS-MCNC: 6 NG/L (ref 0–51)
WBC # BLD AUTO: 9.3 K/UL (ref 3.6–11)

## 2025-02-09 PROCEDURE — 80053 COMPREHEN METABOLIC PANEL: CPT

## 2025-02-09 PROCEDURE — 84484 ASSAY OF TROPONIN QUANT: CPT

## 2025-02-09 PROCEDURE — 93005 ELECTROCARDIOGRAM TRACING: CPT | Performed by: EMERGENCY MEDICINE

## 2025-02-09 PROCEDURE — 85025 COMPLETE CBC W/AUTO DIFF WBC: CPT

## 2025-02-09 PROCEDURE — 99284 EMERGENCY DEPT VISIT MOD MDM: CPT

## 2025-02-09 PROCEDURE — 70450 CT HEAD/BRAIN W/O DYE: CPT

## 2025-02-09 PROCEDURE — 36415 COLL VENOUS BLD VENIPUNCTURE: CPT

## 2025-02-10 LAB
EKG ATRIAL RATE: 83 BPM
EKG DIAGNOSIS: NORMAL
EKG P AXIS: 53 DEGREES
EKG P-R INTERVAL: 124 MS
EKG Q-T INTERVAL: 398 MS
EKG QRS DURATION: 86 MS
EKG QTC CALCULATION (BAZETT): 467 MS
EKG R AXIS: 11 DEGREES
EKG T AXIS: 81 DEGREES
EKG VENTRICULAR RATE: 83 BPM

## 2025-02-10 PROCEDURE — 93010 ELECTROCARDIOGRAM REPORT: CPT | Performed by: INTERNAL MEDICINE

## 2025-02-10 NOTE — ED TRIAGE NOTES
Pt arrives to ED via POV in wheelchair with son who reports pt started having tremors and is having decreased responsiveness starting at 1745. Provider called to triage. Dr. Root in triage. Pt is alert and following commands in triage. Pt reports she feels \"okay\" at this time. Son reports pt has been walking more slowly than normal.

## 2025-02-10 NOTE — ED PROVIDER NOTES
[facility] EMERGENCY DEPARTMENT  EMERGENCY DEPARTMENT ENCOUNTER      Pt Name: Jessy Heath  MRN: [id number]  Birthdate 7/3/1928  Date of evaluation: 2/9/2025  Provider: Barbara Root DO    CHIEF COMPLAINT       Chief Complaint   Patient presents with    Fatigue         HISTORY OF PRESENT ILLNESS   (Location/Symptom, Timing/Onset, Context/Setting, Quality, Duration, Modifying Factors, Severity)  Note limiting factors.   HPI      Review of External Medical Records:     Nursing Notes were reviewed.    REVIEW OF SYSTEMS    (2-9 systems for level 4, 10 or more for level 5)     Review of Systems    Except as noted above the remainder of the review of systems was reviewed and negative.       PAST MEDICAL HISTORY     Past Medical History:   Diagnosis Date    Cerebral amyloid angiopathy (HCC)     CVA (cerebral vascular accident) (HCC) 03/2019    aphasia and right sided weakness, s/p IV TPA with subsequent SAH/IPH, found to have change c/w CAA    Hypothyroid          SURGICAL HISTORY       Past Surgical History:   Procedure Laterality Date    BACK SURGERY      HYSTERECTOMY (CERVIX STATUS UNKNOWN)      TONSILLECTOMY      TOTAL HIP ARTHROPLASTY      right         CURRENT MEDICATIONS       Previous Medications    ASPIRIN 81 MG CHEWABLE TABLET    Take 1 tablet by mouth daily    BRIMONIDINE-TIMOLOL (COMBIGAN) 0.2-0.5 % OPHTHALMIC SOLUTION    Apply 1 drop to eye in the morning and 1 drop in the evening.    BRINZOLAMIDE (AZOPT) 1 % OPHTHALMIC SUSPENSION    Apply 1 drop to eye 2 times daily    CYANOCOBALAMIN 1000 MCG TABLET    Take 5 tablets by mouth daily    CYCLOSPORINE (RESTASIS) 0.05 % OPHTHALMIC EMULSION    Apply 1 drop to eye 2 times daily    DONEPEZIL (ARICEPT) 10 MG TABLET    ceived the following from [facility]: Outside name: donepeziL (ARICEPT) 10 mg tablet    ERGOCALCIFEROL (ERGOCALCIFEROL) 1.25 MG (92892 UT) CAPSULE    ceived the following from [facility]: Outside

## 2025-02-25 ENCOUNTER — TELEPHONE (OUTPATIENT)
Facility: HOSPITAL | Age: 89
End: 2025-02-25

## 2025-02-25 NOTE — TELEPHONE ENCOUNTER
Received wound care referral from [facility]. Called patient and LVM for patient to call office for scheduling

## 2025-02-25 NOTE — TELEPHONE ENCOUNTER
Patient's son, Willy returned call, states patient was seen last week at [facility] and now being referred to vascular. Son will call us back if need to in future